# Patient Record
Sex: MALE | Race: WHITE | NOT HISPANIC OR LATINO | Employment: OTHER | URBAN - METROPOLITAN AREA
[De-identification: names, ages, dates, MRNs, and addresses within clinical notes are randomized per-mention and may not be internally consistent; named-entity substitution may affect disease eponyms.]

---

## 2017-06-22 ENCOUNTER — PRE VISIT (OUTPATIENT)
Dept: ORTHOPEDICS | Facility: CLINIC | Age: 62
End: 2017-06-22

## 2017-06-22 NOTE — TELEPHONE ENCOUNTER
1.  Date/reason for appt:  7/31/17   Right Knee    2.  Referring provider:  Self    3.  Call to patient (Yes / No - short description):  No, transferred from .    Previously followed at [facility]  --  Records/injection/imaging    Regular mailed ALLAN form to home address per request.

## 2017-07-17 NOTE — TELEPHONE ENCOUNTER
Records received from [facility].    Included  Office notes: 9/13/16  Other: injection 9/13/16    Missing/needed records: MRI done at [facility] in 2015, xray's done at [facility]

## 2017-07-21 DIAGNOSIS — G89.29 CHRONIC PAIN OF RIGHT KNEE: Primary | ICD-10-CM

## 2017-07-21 DIAGNOSIS — M25.561 CHRONIC PAIN OF RIGHT KNEE: Primary | ICD-10-CM

## 2017-07-31 ENCOUNTER — OFFICE VISIT (OUTPATIENT)
Dept: ORTHOPEDICS | Facility: CLINIC | Age: 62
End: 2017-07-31

## 2017-07-31 VITALS — WEIGHT: 232 LBS | BODY MASS INDEX: 29.77 KG/M2 | HEIGHT: 74 IN

## 2017-07-31 DIAGNOSIS — M17.31 POST-TRAUMATIC OSTEOARTHRITIS OF RIGHT KNEE: Primary | ICD-10-CM

## 2017-07-31 RX ORDER — LISINOPRIL AND HYDROCHLOROTHIAZIDE 20; 25 MG/1; MG/1
1 TABLET ORAL DAILY
COMMUNITY
Start: 2016-09-12

## 2017-07-31 RX ORDER — IBUPROFEN 800 MG/1
800 TABLET, FILM COATED ORAL
COMMUNITY
Start: 2017-06-11 | End: 2018-04-05

## 2017-07-31 RX ORDER — CYANOCOBALAMIN 1000 UG/ML
1000 INJECTION, SOLUTION INTRAMUSCULAR; SUBCUTANEOUS
COMMUNITY
Start: 2016-09-30 | End: 2017-07-31

## 2017-07-31 RX ORDER — PRAVASTATIN SODIUM 10 MG
10 TABLET ORAL DAILY
COMMUNITY
Start: 2017-06-12

## 2017-07-31 NOTE — PROGRESS NOTES
"CC: Bilateral knee pain R>L    HPI:  Juan Manuel is seen today as a self referral for right knee pain.  He has a history of a softball twisting injury in 2004 that needed arthoscopic treatment for a medial menisectomy. Prior to this injury patient reports \"very little knee pain.\". Reports worsening knee pain that he rates a consistent \"ache\" at a \"5 or 6\". Unable to localize the pain but is diffuse throughout knee joint and radiates to the back of the knee. Takes ibuprofen which \"doesn't work as well as it used to\". Has done visco supplements and cortisone with little lasting relief of symptoms. Has activity related night pain. Denies instability \"just achey\".  No new injuries, falls, twist, or recent trauma.    PMH: Reviewed    ROS: Reviewed    FH/SH: Reviewed    Allergies: Reviewed    Meds: Reviewed    PE:  Pleasant and cooperative male alert and oriented x3. Walks with a varus gait deformity and slight limp unable to fully extend right knee during walk through phase. Skin intact with no open areas, rashes, or bruises. Previous portals are healed and intact. Mild joint effusion with a popliteal cyst posterior that is nontender to palpate.ROM -. Mild ligament instability noted with valgus/varus stress in 0,45, and 90 degrees. Negative Lachmans but guarded secondary to extension contracture. Strength 5/5. No neuro deficits. No effusion or erythema noted to joint itself.    Xrays were taken which show bilateral varus malalignment -12/-10 with medial bone on bone end stage grade IV medial OA R >L. Significant osteophytes noted. Lateral compartment is narrowed with significant osteophytes with end stage PF changes noted.    MRI reviewed from 2015.    Dx:   1. 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 Do nothing  2. NSAIDS  3. Injections  4. Bracing  5. PT  6. Surgical options discussed.  At this time, Dr. Beatty recommending a right posterior stabilized TKA.  Risks and benefits " were discussed including post op complications of stiffness, infection, blood clots, and nerve palsy. Informed consent obtained. 30 minutes spent in consultation and planning.     I, Dr. Marcello Beatty have seen and examined this patient with REE Eckert, CNP.

## 2017-07-31 NOTE — NURSING NOTE
Teaching Flowsheet   Relevant Diagnosis: Right knee osteoarthritis  Teaching Topic: Right total knee     Person(s) involved in teaching:   Patient     Motivation Level:  Asks Questions: Yes  Eager to Learn: Yes  Cooperative: Yes  Receptive (willing/able to accept information): Yes  Any cultural factors/Church beliefs that may influence understanding or compliance? No       Patient demonstrates understanding of the following:  Reason for the appointment, diagnosis and treatment plan: Yes  Knowledge of proper use of medications and conditions for which they are ordered (with special attention to potential side effects or drug interactions): Yes  Which situations necessitate calling provider and whom to contact: Yes       Teaching Concerns Addressed:   Patient would like to have the surgery in January. Instructed on the on-line registration for the total joint class, he stated he would sign up closer to surgery date.   Has history of hypertension, but other history is negative.      Proper use and care of medical equip, care aids, etc Yes  Nutritional needs and diet plan: Yes  Pain management techniques: Yes  Wound Care: Yes  How and/when to access community resources: Yes     Instructional Materials Used/Given: Pre-operative surgery packet and anti-bacterial soap.     Time spent with patient: 15 minutes.    a. Does the patient take five or more prescription medications? No  b. Does patient have difficulty walking up two flights of stairs? Yes  c. Is patient s BMI 35 or greater? No  d. Is patient an insulin dependent diabetic? No  e. Does patient have a history of having a heart attack or a heart surgery of any kind? No  f. Does patient have a history of heart failure? No  g. Does the patient have a history of any type of transplant? No  h. Does the patient use inhalers on a daily basis? No  i. Does the patient have a history of pulmonary hypertension? No

## 2017-07-31 NOTE — LETTER
"7/31/2017       RE: Kareem Bai  [address]  [facility] [address]     Dear Colleague,    Thank you for referring your patient, Kareem Bai, to the [facility] at [facility]. Please see a copy of my visit note below.    CC: Bilateral knee pain R>L    HPI:  Juan Manuel is seen today as a self referral for right knee pain.  He has a history of a softball twisting injury in 2004 that needed arthoscopic treatment for a medial menisectomy. Prior to this injury patient reports \"very little knee pain.\". Reports worsening knee pain that he rates a consistent \"ache\" at a \"5 or 6\". Unable to localize the pain but is diffuse throughout knee joint and radiates to the back of the knee. Takes ibuprofen which \"doesn't work as well as it used to\". Has done visco supplements and cortisone with little lasting relief of symptoms. Has activity related night pain. Denies instability \"just achey\".  No new injuries, falls, twist, or recent trauma.    PMH: Reviewed    ROS: Reviewed    FH/SH: Reviewed    Allergies: Reviewed    Meds: Reviewed    PE:  Pleasant and cooperative male alert and oriented x3. Walks with a varus gait deformity and slight limp unable to fully extend right knee during walk through phase. Skin intact with no open areas, rashes, or bruises. Previous portals are healed and intact. Mild joint effusion with a popliteal cyst posterior that is nontender to palpate.ROM -. Mild ligament instability noted with valgus/varus stress in 0,45, and 90 degrees. Negative Lachmans but guarded secondary to extension contracture. Strength 5/5. No neuro deficits. No effusion or erythema noted to joint itself.    Xrays were taken which show bilateral varus malalignment -12/-10 with medial bone on bone end stage grade IV medial OA R >L. Significant osteophytes noted. Lateral compartment is narrowed with significant osteophytes with end stage PF changes noted.    MRI " reviewed from 2015.    Dx:   1. 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 Do nothing  2. NSAIDS  3. Injections  4. Bracing  5. PT  6. Surgical options discussed.  At this time, Dr. Beatty recommending a right posterior stabilized TKA.  Risks and benefits were discussed including post op complications of stiffness, infection, blood clots, and nerve palsy. Informed consent obtained. 30 minutes spent in consultation and planning.     Again, thank you for allowing me to participate in the care of your patient.      Sincerely,    Marcello Beatty MD

## 2017-07-31 NOTE — MR AVS SNAPSHOT
After Visit Summary   2017    Kareem Bai    MRN: [id number]           Patient Information     Date Of Birth          1955        Visit Information        Provider Department      2017 4:00 PM Marcello Beatty MD [facility]        Today's Diagnoses     Post-traumatic osteoarthritis of right knee    -  1       Follow-ups after your visit        Your next 10 appointments already scheduled     2018   Procedure with Marcello Beatty MD   [facility], [place], Same Day Surgery (--)    [address]  [facility] [place] [address] [phone]              Who to contact     Please call your clinic at [phone] to:    Ask questions about your health    Make or cancel appointments    Discuss your medicines    Learn about your test results    Speak to your doctor   If you have compliments or concerns about an experience at your clinic, or if you wish to file a complaint, please contact [facility] Patient Relations at [phone] or email us at [email or web address]         Additional Information About Your Visit        MyChart Information     [employer] is an electronic gateway that provides easy, online access to your medical records. With [employer], you can request a clinic appointment, read your test results, renew a prescription or communicate with your care team.     To sign up for [employer] visit the website at [email or web address]   You will be asked to enter the access code listed below, as well as some personal information. Please follow the directions to create your username and password.     Your access code is: [email or web address]  Expires: 10/15/2017  6:31 AM     Your access code will  in 90 days. If you need help or a new code, please contact your [facility] or call [phone] for assistance.        Care EveryWhere ID     This is your Care EveryWhere ID. This could be used by other  "organizations to access your [facility] medical records  [id number]        Your Vitals Were     Height BMI (Body Mass Index)                1.88 m (6' 2\") 29.79 kg/m2           Blood Pressure from Last 3 Encounters:   No data found for BP    Weight from Last 3 Encounters:   No data found for Wt              Today, you had the following     No orders found for display         Today's Medication Changes          These changes are accurate as of: 7/31/17 11:59 PM.  If you have any questions, ask your nurse or doctor.               Stop taking these medicines if you haven't already. Please contact your care team if you have questions.     cyanocobalamin 1000 MCG/ML injection   Commonly known as:  VITAMIN B12   Stopped by:  Marcello Beatty MD                    Primary Care Provider Office Phone # Fax #    [facility] [phone] [address]  [facility] [address]        Equal Access to Services     [facility]: Hadii aad ku hadasho Soomaali, waaxda luqadaha, qaybta kaalmada adeegyada, waxay idiin hayaan glenn díazn . So [facility] [phone].    ATENCIÓN: Si habla español, tiene a guaman disposición servicios gratuitos de asistencia lingüística. Kandy al [phone].    We comply with applicable federal civil rights laws and [place] laws. We do not discriminate on the basis of race, color, national origin, age, disability sex, sexual orientation or gender identity.            Thank you!     Thank you for choosing [facility] [place] [facility]  for your care. Our goal is always to provide you with excellent care. Hearing back from our patients is one way we can continue to improve our services. Please take a few minutes to complete the written survey that you may receive in the mail after your visit with us. Thank you!             Your Updated Medication List - Protect others around you: Learn how to safely use, store and throw away your medicines at " www.disposemymeds.org.          This list is accurate as of: 7/31/17 11:59 PM.  Always use your most recent med list.                   Brand Name Dispense Instructions for use Diagnosis    ibuprofen 800 MG tablet    ADVIL/MOTRIN     Take 800 mg by mouth        lisinopril-hydrochlorothiazide 20-25 MG per tablet    PRINZIDE/ZESTORETIC          pravastatin 10 MG tablet    PRAVACHOL     TAKE ONE TABLET BY MOUTH AT BEDTIME

## 2017-12-12 ENCOUNTER — HOSPITAL ENCOUNTER (OUTPATIENT)
Dept: EDUCATION SERVICES | Facility: CLINIC | Age: 62
End: 2017-12-12
Payer: COMMERCIAL

## 2018-01-01 ENCOUNTER — ANESTHESIA EVENT (OUTPATIENT)
Dept: SURGERY | Facility: CLINIC | Age: 63
End: 2018-01-01
Payer: COMMERCIAL

## 2018-01-04 ENCOUNTER — APPOINTMENT (OUTPATIENT)
Dept: GENERAL RADIOLOGY | Facility: CLINIC | Age: 63
End: 2018-01-04
Attending: ORTHOPAEDIC SURGERY
Payer: COMMERCIAL

## 2018-01-04 ENCOUNTER — ANESTHESIA (OUTPATIENT)
Dept: SURGERY | Facility: CLINIC | Age: 63
End: 2018-01-04
Payer: COMMERCIAL

## 2018-01-04 ENCOUNTER — APPOINTMENT (OUTPATIENT)
Dept: PHYSICAL THERAPY | Facility: CLINIC | Age: 63
End: 2018-01-04
Attending: ORTHOPAEDIC SURGERY
Payer: COMMERCIAL

## 2018-01-04 ENCOUNTER — SURGERY (OUTPATIENT)
Age: 63
End: 2018-01-04

## 2018-01-04 ENCOUNTER — HOSPITAL ENCOUNTER (INPATIENT)
Facility: CLINIC | Age: 63
LOS: 3 days | Discharge: HOME | End: 2018-01-07
Attending: ORTHOPAEDIC SURGERY | Admitting: ORTHOPAEDIC SURGERY
Payer: COMMERCIAL

## 2018-01-04 DIAGNOSIS — Z96.651 HISTORY OF TOTAL RIGHT KNEE REPLACEMENT: Primary | ICD-10-CM

## 2018-01-04 PROBLEM — Z96.659 HX OF TOTAL KNEE ARTHROPLASTY: Status: ACTIVE | Noted: 2018-01-04

## 2018-01-04 LAB
ABO + RH BLD: NORMAL
ABO + RH BLD: NORMAL
ALBUMIN UR-MCNC: NEGATIVE MG/DL
APPEARANCE UR: CLEAR
BILIRUB UR QL STRIP: NEGATIVE
BLD GP AB SCN SERPL QL: NORMAL
BLOOD BANK CMNT PATIENT-IMP: NORMAL
COLOR UR AUTO: YELLOW
GLUCOSE SERPL-MCNC: 105 MG/DL (ref 70–99)
GLUCOSE UR STRIP-MCNC: NEGATIVE MG/DL
HGB BLD-MCNC: 14.8 G/DL (ref 13.3–17.7)
HGB UR QL STRIP: NEGATIVE
KETONES UR STRIP-MCNC: NEGATIVE MG/DL
LEUKOCYTE ESTERASE UR QL STRIP: ABNORMAL
MUCOUS THREADS #/AREA URNS LPF: PRESENT /LPF
NITRATE UR QL: NEGATIVE
PH UR STRIP: 5.5 PH (ref 5–7)
RBC #/AREA URNS AUTO: 1 /HPF (ref 0–2)
SOURCE: ABNORMAL
SP GR UR STRIP: 1.02 (ref 1–1.03)
SPECIMEN EXP DATE BLD: NORMAL
UROBILINOGEN UR STRIP-MCNC: NORMAL MG/DL (ref 0–2)
WBC #/AREA URNS AUTO: <1 /HPF (ref 0–2)

## 2018-01-04 PROCEDURE — 25000125 ZZHC RX 250: Performed by: STUDENT IN AN ORGANIZED HEALTH CARE EDUCATION/TRAINING PROGRAM

## 2018-01-04 PROCEDURE — 0SRC0J9 REPLACEMENT OF RIGHT KNEE JOINT WITH SYNTHETIC SUBSTITUTE, CEMENTED, OPEN APPROACH: ICD-10-PCS | Performed by: ORTHOPAEDIC SURGERY

## 2018-01-04 PROCEDURE — 25000128 H RX IP 250 OP 636: Performed by: NURSE ANESTHETIST, CERTIFIED REGISTERED

## 2018-01-04 PROCEDURE — 25000128 H RX IP 250 OP 636: Performed by: STUDENT IN AN ORGANIZED HEALTH CARE EDUCATION/TRAINING PROGRAM

## 2018-01-04 PROCEDURE — 37000009 ZZH ANESTHESIA TECHNICAL FEE, EACH ADDTL 15 MIN: Performed by: ORTHOPAEDIC SURGERY

## 2018-01-04 PROCEDURE — 85018 HEMOGLOBIN: CPT | Performed by: ANESTHESIOLOGY

## 2018-01-04 PROCEDURE — 25000132 ZZH RX MED GY IP 250 OP 250 PS 637: Performed by: ORTHOPAEDIC SURGERY

## 2018-01-04 PROCEDURE — 40000193 ZZH STATISTIC PT WARD VISIT: Performed by: PHYSICAL THERAPIST

## 2018-01-04 PROCEDURE — 0MNN0ZZ RELEASE RIGHT KNEE BURSA AND LIGAMENT, OPEN APPROACH: ICD-10-PCS | Performed by: ORTHOPAEDIC SURGERY

## 2018-01-04 PROCEDURE — 27210995 ZZH RX 272: Performed by: ORTHOPAEDIC SURGERY

## 2018-01-04 PROCEDURE — C9290 INJ, BUPIVACAINE LIPOSOME: HCPCS | Performed by: STUDENT IN AN ORGANIZED HEALTH CARE EDUCATION/TRAINING PROGRAM

## 2018-01-04 PROCEDURE — 25000125 ZZHC RX 250: Performed by: ORTHOPAEDIC SURGERY

## 2018-01-04 PROCEDURE — 81001 URINALYSIS AUTO W/SCOPE: CPT | Performed by: ORTHOPAEDIC SURGERY

## 2018-01-04 PROCEDURE — 97161 PT EVAL LOW COMPLEX 20 MIN: CPT | Mod: GP | Performed by: PHYSICAL THERAPIST

## 2018-01-04 PROCEDURE — 86900 BLOOD TYPING SEROLOGIC ABO: CPT | Performed by: ORTHOPAEDIC SURGERY

## 2018-01-04 PROCEDURE — 99207 ZZC CONSULT E&M CHANGED TO SUBSEQUENT LEVEL: CPT | Performed by: INTERNAL MEDICINE

## 2018-01-04 PROCEDURE — 71000015 ZZH RECOVERY PHASE 1 LEVEL 2 EA ADDTL HR: Performed by: ORTHOPAEDIC SURGERY

## 2018-01-04 PROCEDURE — 40000986 XR KNEE PORT RT 1/2 VW: Mod: RT

## 2018-01-04 PROCEDURE — 25000125 ZZHC RX 250: Performed by: NURSE ANESTHETIST, CERTIFIED REGISTERED

## 2018-01-04 PROCEDURE — 97530 THERAPEUTIC ACTIVITIES: CPT | Mod: GP | Performed by: PHYSICAL THERAPIST

## 2018-01-04 PROCEDURE — 25000566 ZZH SEVOFLURANE, EA 15 MIN: Performed by: ORTHOPAEDIC SURGERY

## 2018-01-04 PROCEDURE — 40000170 ZZH STATISTIC PRE-PROCEDURE ASSESSMENT II: Performed by: ORTHOPAEDIC SURGERY

## 2018-01-04 PROCEDURE — 97116 GAIT TRAINING THERAPY: CPT | Mod: GP | Performed by: PHYSICAL THERAPIST

## 2018-01-04 PROCEDURE — 82947 ASSAY GLUCOSE BLOOD QUANT: CPT | Performed by: ANESTHESIOLOGY

## 2018-01-04 PROCEDURE — 25000128 H RX IP 250 OP 636: Performed by: ORTHOPAEDIC SURGERY

## 2018-01-04 PROCEDURE — 86901 BLOOD TYPING SEROLOGIC RH(D): CPT | Performed by: ORTHOPAEDIC SURGERY

## 2018-01-04 PROCEDURE — 37000008 ZZH ANESTHESIA TECHNICAL FEE, 1ST 30 MIN: Performed by: ORTHOPAEDIC SURGERY

## 2018-01-04 PROCEDURE — 27810169 ZZH OR IMPLANT GENERAL: Performed by: ORTHOPAEDIC SURGERY

## 2018-01-04 PROCEDURE — 36000076 ZZH SURGERY LEVEL 6 EA 15 ADDTL MIN - UMMC: Performed by: ORTHOPAEDIC SURGERY

## 2018-01-04 PROCEDURE — 86850 RBC ANTIBODY SCREEN: CPT | Performed by: ORTHOPAEDIC SURGERY

## 2018-01-04 PROCEDURE — 36415 COLL VENOUS BLD VENIPUNCTURE: CPT | Performed by: ORTHOPAEDIC SURGERY

## 2018-01-04 PROCEDURE — 25000128 H RX IP 250 OP 636: Performed by: ANESTHESIOLOGY

## 2018-01-04 PROCEDURE — 27210794 ZZH OR GENERAL SUPPLY STERILE: Performed by: ORTHOPAEDIC SURGERY

## 2018-01-04 PROCEDURE — C1776 JOINT DEVICE (IMPLANTABLE): HCPCS | Performed by: ORTHOPAEDIC SURGERY

## 2018-01-04 PROCEDURE — 8E0YXBZ COMPUTER ASSISTED PROCEDURE OF LOWER EXTREMITY: ICD-10-PCS | Performed by: ORTHOPAEDIC SURGERY

## 2018-01-04 PROCEDURE — 12000001 ZZH R&B MED SURG/OB UMMC

## 2018-01-04 PROCEDURE — 71000014 ZZH RECOVERY PHASE 1 LEVEL 2 FIRST HR: Performed by: ORTHOPAEDIC SURGERY

## 2018-01-04 PROCEDURE — 99232 SBSQ HOSP IP/OBS MODERATE 35: CPT | Performed by: INTERNAL MEDICINE

## 2018-01-04 PROCEDURE — 36000074 ZZH SURGERY LEVEL 6 1ST 30 MIN - UMMC: Performed by: ORTHOPAEDIC SURGERY

## 2018-01-04 PROCEDURE — 25800025 ZZH RX 258: Performed by: ORTHOPAEDIC SURGERY

## 2018-01-04 PROCEDURE — 97110 THERAPEUTIC EXERCISES: CPT | Mod: GP | Performed by: PHYSICAL THERAPIST

## 2018-01-04 DEVICE — IMP COMP PATELLA HOWM TRI ASYM 35X10MM 5551-G-350: Type: IMPLANTABLE DEVICE | Site: KNEE | Status: FUNCTIONAL

## 2018-01-04 DEVICE — IMP BASEPLATE TIBIAL HOWM TRI 7 5520-B-700: Type: IMPLANTABLE DEVICE | Site: KNEE | Status: FUNCTIONAL

## 2018-01-04 DEVICE — BONE CEMENT SIMPLEX W/TOBRAMYCIN 6197-9-001: Type: IMPLANTABLE DEVICE | Site: KNEE | Status: FUNCTIONAL

## 2018-01-04 DEVICE — IMP COMP FEM STRK TRIATHLN PS RT 7 5515-F-702: Type: IMPLANTABLE DEVICE | Site: KNEE | Status: FUNCTIONAL

## 2018-01-04 DEVICE — IMPLANTABLE DEVICE: Type: IMPLANTABLE DEVICE | Site: KNEE | Status: FUNCTIONAL

## 2018-01-04 RX ORDER — ONDANSETRON 2 MG/ML
INJECTION INTRAMUSCULAR; INTRAVENOUS PRN
Status: DISCONTINUED | OUTPATIENT
Start: 2018-01-04 | End: 2018-01-04

## 2018-01-04 RX ORDER — MAGNESIUM HYDROXIDE 1200 MG/15ML
LIQUID ORAL PRN
Status: DISCONTINUED | OUTPATIENT
Start: 2018-01-04 | End: 2018-01-04 | Stop reason: HOSPADM

## 2018-01-04 RX ORDER — ONDANSETRON 2 MG/ML
4 INJECTION INTRAMUSCULAR; INTRAVENOUS EVERY 6 HOURS PRN
Status: DISCONTINUED | OUTPATIENT
Start: 2018-01-04 | End: 2018-01-07 | Stop reason: HOSPADM

## 2018-01-04 RX ORDER — DEXAMETHASONE SODIUM PHOSPHATE 4 MG/ML
INJECTION, SOLUTION INTRA-ARTICULAR; INTRALESIONAL; INTRAMUSCULAR; INTRAVENOUS; SOFT TISSUE PRN
Status: DISCONTINUED | OUTPATIENT
Start: 2018-01-04 | End: 2018-01-04

## 2018-01-04 RX ORDER — CLINDAMYCIN PHOSPHATE 900 MG/50ML
900 INJECTION, SOLUTION INTRAVENOUS EVERY 8 HOURS
Status: DISCONTINUED | OUTPATIENT
Start: 2018-01-04 | End: 2018-01-04 | Stop reason: HOSPADM

## 2018-01-04 RX ORDER — BUPIVACAINE HYDROCHLORIDE AND EPINEPHRINE 5; 5 MG/ML; UG/ML
INJECTION, SOLUTION PERINEURAL PRN
Status: DISCONTINUED | OUTPATIENT
Start: 2018-01-04 | End: 2018-01-04

## 2018-01-04 RX ORDER — KETOROLAC TROMETHAMINE 30 MG/ML
30 INJECTION, SOLUTION INTRAMUSCULAR; INTRAVENOUS EVERY 6 HOURS PRN
Status: DISCONTINUED | OUTPATIENT
Start: 2018-01-04 | End: 2018-01-07 | Stop reason: HOSPADM

## 2018-01-04 RX ORDER — FLUMAZENIL 0.1 MG/ML
0.2 INJECTION, SOLUTION INTRAVENOUS
Status: DISCONTINUED | OUTPATIENT
Start: 2018-01-04 | End: 2018-01-04 | Stop reason: HOSPADM

## 2018-01-04 RX ORDER — CLINDAMYCIN PHOSPHATE 900 MG/50ML
900 INJECTION, SOLUTION INTRAVENOUS SEE ADMIN INSTRUCTIONS
Status: DISCONTINUED | OUTPATIENT
Start: 2018-01-04 | End: 2018-01-04 | Stop reason: HOSPADM

## 2018-01-04 RX ORDER — SODIUM CHLORIDE, SODIUM LACTATE, POTASSIUM CHLORIDE, CALCIUM CHLORIDE 600; 310; 30; 20 MG/100ML; MG/100ML; MG/100ML; MG/100ML
INJECTION, SOLUTION INTRAVENOUS CONTINUOUS
Status: DISCONTINUED | OUTPATIENT
Start: 2018-01-04 | End: 2018-01-07 | Stop reason: HOSPADM

## 2018-01-04 RX ORDER — ONDANSETRON 4 MG/1
4 TABLET, ORALLY DISINTEGRATING ORAL EVERY 30 MIN PRN
Status: DISCONTINUED | OUTPATIENT
Start: 2018-01-04 | End: 2018-01-04 | Stop reason: HOSPADM

## 2018-01-04 RX ORDER — ACETAMINOPHEN 325 MG/1
975 TABLET ORAL ONCE
Status: COMPLETED | OUTPATIENT
Start: 2018-01-04 | End: 2018-01-04

## 2018-01-04 RX ORDER — CLINDAMYCIN PHOSPHATE 900 MG/50ML
900 INJECTION, SOLUTION INTRAVENOUS
Status: DISCONTINUED | OUTPATIENT
Start: 2018-01-04 | End: 2018-01-04 | Stop reason: HOSPADM

## 2018-01-04 RX ORDER — SODIUM CHLORIDE, SODIUM LACTATE, POTASSIUM CHLORIDE, CALCIUM CHLORIDE 600; 310; 30; 20 MG/100ML; MG/100ML; MG/100ML; MG/100ML
INJECTION, SOLUTION INTRAVENOUS CONTINUOUS PRN
Status: DISCONTINUED | OUTPATIENT
Start: 2018-01-04 | End: 2018-01-04

## 2018-01-04 RX ORDER — FENTANYL CITRATE 50 UG/ML
25-50 INJECTION, SOLUTION INTRAMUSCULAR; INTRAVENOUS
Status: DISCONTINUED | OUTPATIENT
Start: 2018-01-04 | End: 2018-01-04 | Stop reason: HOSPADM

## 2018-01-04 RX ORDER — GABAPENTIN 300 MG/1
300 CAPSULE ORAL
Status: COMPLETED | OUTPATIENT
Start: 2018-01-04 | End: 2018-01-04

## 2018-01-04 RX ORDER — CELECOXIB 200 MG/1
200 CAPSULE ORAL ONCE
Status: COMPLETED | OUTPATIENT
Start: 2018-01-04 | End: 2018-01-04

## 2018-01-04 RX ORDER — SODIUM CHLORIDE, SODIUM LACTATE, POTASSIUM CHLORIDE, CALCIUM CHLORIDE 600; 310; 30; 20 MG/100ML; MG/100ML; MG/100ML; MG/100ML
INJECTION, SOLUTION INTRAVENOUS CONTINUOUS
Status: DISCONTINUED | OUTPATIENT
Start: 2018-01-04 | End: 2018-01-04 | Stop reason: HOSPADM

## 2018-01-04 RX ORDER — OXYCODONE HYDROCHLORIDE 5 MG/1
5-10 TABLET ORAL
Status: DISCONTINUED | OUTPATIENT
Start: 2018-01-04 | End: 2018-01-07 | Stop reason: HOSPADM

## 2018-01-04 RX ORDER — PRAVASTATIN SODIUM 10 MG
10 TABLET ORAL DAILY
Status: DISCONTINUED | OUTPATIENT
Start: 2018-01-05 | End: 2018-01-07 | Stop reason: HOSPADM

## 2018-01-04 RX ORDER — AMOXICILLIN 250 MG
1-2 CAPSULE ORAL 2 TIMES DAILY
Status: DISCONTINUED | OUTPATIENT
Start: 2018-01-04 | End: 2018-01-07 | Stop reason: HOSPADM

## 2018-01-04 RX ORDER — NALOXONE HYDROCHLORIDE 0.4 MG/ML
.1-.4 INJECTION, SOLUTION INTRAMUSCULAR; INTRAVENOUS; SUBCUTANEOUS
Status: DISCONTINUED | OUTPATIENT
Start: 2018-01-04 | End: 2018-01-04 | Stop reason: HOSPADM

## 2018-01-04 RX ORDER — PROPOFOL 10 MG/ML
INJECTION, EMULSION INTRAVENOUS PRN
Status: DISCONTINUED | OUTPATIENT
Start: 2018-01-04 | End: 2018-01-04

## 2018-01-04 RX ORDER — LIDOCAINE HYDROCHLORIDE 20 MG/ML
INJECTION, SOLUTION INFILTRATION; PERINEURAL PRN
Status: DISCONTINUED | OUTPATIENT
Start: 2018-01-04 | End: 2018-01-04

## 2018-01-04 RX ORDER — BISACODYL 10 MG
10 SUPPOSITORY, RECTAL RECTAL DAILY
Status: DISCONTINUED | OUTPATIENT
Start: 2018-01-05 | End: 2018-01-07 | Stop reason: HOSPADM

## 2018-01-04 RX ORDER — EPHEDRINE SULFATE 50 MG/ML
INJECTION, SOLUTION INTRAVENOUS PRN
Status: DISCONTINUED | OUTPATIENT
Start: 2018-01-04 | End: 2018-01-04

## 2018-01-04 RX ORDER — FENTANYL CITRATE 50 UG/ML
25-50 INJECTION, SOLUTION INTRAMUSCULAR; INTRAVENOUS EVERY 5 MIN PRN
Status: DISCONTINUED | OUTPATIENT
Start: 2018-01-04 | End: 2018-01-04 | Stop reason: HOSPADM

## 2018-01-04 RX ORDER — NALOXONE HYDROCHLORIDE 0.4 MG/ML
.1-.4 INJECTION, SOLUTION INTRAMUSCULAR; INTRAVENOUS; SUBCUTANEOUS
Status: DISCONTINUED | OUTPATIENT
Start: 2018-01-04 | End: 2018-01-07 | Stop reason: HOSPADM

## 2018-01-04 RX ORDER — ONDANSETRON 2 MG/ML
4 INJECTION INTRAMUSCULAR; INTRAVENOUS EVERY 30 MIN PRN
Status: DISCONTINUED | OUTPATIENT
Start: 2018-01-04 | End: 2018-01-04 | Stop reason: HOSPADM

## 2018-01-04 RX ORDER — KETOROLAC TROMETHAMINE 30 MG/ML
INJECTION, SOLUTION INTRAMUSCULAR; INTRAVENOUS PRN
Status: DISCONTINUED | OUTPATIENT
Start: 2018-01-04 | End: 2018-01-04

## 2018-01-04 RX ORDER — HYDROMORPHONE HYDROCHLORIDE 1 MG/ML
.3-.5 INJECTION, SOLUTION INTRAMUSCULAR; INTRAVENOUS; SUBCUTANEOUS
Status: DISCONTINUED | OUTPATIENT
Start: 2018-01-04 | End: 2018-01-07 | Stop reason: HOSPADM

## 2018-01-04 RX ORDER — FENTANYL CITRATE 50 UG/ML
INJECTION, SOLUTION INTRAMUSCULAR; INTRAVENOUS PRN
Status: DISCONTINUED | OUTPATIENT
Start: 2018-01-04 | End: 2018-01-04

## 2018-01-04 RX ORDER — ONDANSETRON 4 MG/1
4 TABLET, ORALLY DISINTEGRATING ORAL EVERY 6 HOURS PRN
Status: DISCONTINUED | OUTPATIENT
Start: 2018-01-04 | End: 2018-01-07 | Stop reason: HOSPADM

## 2018-01-04 RX ORDER — LIDOCAINE 40 MG/G
CREAM TOPICAL
Status: DISCONTINUED | OUTPATIENT
Start: 2018-01-04 | End: 2018-01-07 | Stop reason: HOSPADM

## 2018-01-04 RX ORDER — ASPIRIN 325 MG/1
325 TABLET, FILM COATED ORAL DAILY
Status: DISCONTINUED | OUTPATIENT
Start: 2018-01-04 | End: 2018-01-07 | Stop reason: HOSPADM

## 2018-01-04 RX ORDER — ACETAMINOPHEN 325 MG/1
650 TABLET ORAL EVERY 4 HOURS PRN
Status: DISCONTINUED | OUTPATIENT
Start: 2018-01-07 | End: 2018-01-07 | Stop reason: HOSPADM

## 2018-01-04 RX ORDER — BUPIVACAINE HYDROCHLORIDE AND EPINEPHRINE 5; 5 MG/ML; UG/ML
INJECTION, SOLUTION PERINEURAL PRN
Status: DISCONTINUED | OUTPATIENT
Start: 2018-01-04 | End: 2018-01-04 | Stop reason: HOSPADM

## 2018-01-04 RX ADMIN — MIDAZOLAM 1 MG: 1 INJECTION INTRAMUSCULAR; INTRAVENOUS at 07:07

## 2018-01-04 RX ADMIN — MIDAZOLAM HYDROCHLORIDE 1 MG: 1 INJECTION, SOLUTION INTRAMUSCULAR; INTRAVENOUS at 06:53

## 2018-01-04 RX ADMIN — FENTANYL CITRATE 50 MCG: 50 INJECTION, SOLUTION INTRAMUSCULAR; INTRAVENOUS at 09:30

## 2018-01-04 RX ADMIN — SODIUM CHLORIDE, POTASSIUM CHLORIDE, SODIUM LACTATE AND CALCIUM CHLORIDE: 600; 310; 30; 20 INJECTION, SOLUTION INTRAVENOUS at 08:45

## 2018-01-04 RX ADMIN — SODIUM CHLORIDE 1 G: 9 INJECTION, SOLUTION INTRAVENOUS at 07:25

## 2018-01-04 RX ADMIN — CELECOXIB 200 MG: 200 CAPSULE ORAL at 06:20

## 2018-01-04 RX ADMIN — GABAPENTIN 300 MG: 300 CAPSULE ORAL at 06:20

## 2018-01-04 RX ADMIN — OXYCODONE HYDROCHLORIDE 5 MG: 5 TABLET ORAL at 13:57

## 2018-01-04 RX ADMIN — SODIUM CHLORIDE, POTASSIUM CHLORIDE, SODIUM LACTATE AND CALCIUM CHLORIDE: 600; 310; 30; 20 INJECTION, SOLUTION INTRAVENOUS at 07:00

## 2018-01-04 RX ADMIN — PHENYLEPHRINE HYDROCHLORIDE 100 MCG: 10 INJECTION, SOLUTION INTRAMUSCULAR; INTRAVENOUS; SUBCUTANEOUS at 09:01

## 2018-01-04 RX ADMIN — MIDAZOLAM 1 MG: 1 INJECTION INTRAMUSCULAR; INTRAVENOUS at 07:10

## 2018-01-04 RX ADMIN — BUPIVACAINE HYDROCHLORIDE AND EPINEPHRINE BITARTRATE 5 ML: 5; .005 INJECTION, SOLUTION EPIDURAL; INTRACAUDAL; PERINEURAL at 07:00

## 2018-01-04 RX ADMIN — SODIUM CHLORIDE 700 ML: 900 IRRIGANT IRRIGATION at 08:57

## 2018-01-04 RX ADMIN — DEXAMETHASONE SODIUM PHOSPHATE 8 MG: 4 INJECTION, SOLUTION INTRAMUSCULAR; INTRAVENOUS at 07:21

## 2018-01-04 RX ADMIN — PHENYLEPHRINE HYDROCHLORIDE 100 MCG: 10 INJECTION, SOLUTION INTRAMUSCULAR; INTRAVENOUS; SUBCUTANEOUS at 07:42

## 2018-01-04 RX ADMIN — OXYCODONE HYDROCHLORIDE 5 MG: 5 TABLET ORAL at 20:22

## 2018-01-04 RX ADMIN — CLINDAMYCIN PHOSPHATE 900 MG: 18 INJECTION, SOLUTION INTRAVENOUS at 07:29

## 2018-01-04 RX ADMIN — EPHEDRINE SULFATE 5 MG: 50 INJECTION, SOLUTION INTRAVENOUS at 07:42

## 2018-01-04 RX ADMIN — BUPIVACAINE HYDROCHLORIDE AND EPINEPHRINE BITARTRATE 6 ML: 5; .005 INJECTION, SOLUTION EPIDURAL; INTRACAUDAL; PERINEURAL at 07:47

## 2018-01-04 RX ADMIN — ACETAMINOPHEN 975 MG: 325 TABLET, FILM COATED ORAL at 06:20

## 2018-01-04 RX ADMIN — FENTANYL CITRATE 100 MCG: 50 INJECTION, SOLUTION INTRAMUSCULAR; INTRAVENOUS at 07:10

## 2018-01-04 RX ADMIN — KETOROLAC TROMETHAMINE 15 MG: 30 INJECTION, SOLUTION INTRAMUSCULAR at 09:06

## 2018-01-04 RX ADMIN — PHENYLEPHRINE HYDROCHLORIDE 100 MCG: 10 INJECTION, SOLUTION INTRAMUSCULAR; INTRAVENOUS; SUBCUTANEOUS at 07:18

## 2018-01-04 RX ADMIN — SODIUM CHLORIDE, POTASSIUM CHLORIDE, SODIUM LACTATE AND CALCIUM CHLORIDE: 600; 310; 30; 20 INJECTION, SOLUTION INTRAVENOUS at 07:45

## 2018-01-04 RX ADMIN — SODIUM CHLORIDE 200 ML: 900 IRRIGANT IRRIGATION at 08:26

## 2018-01-04 RX ADMIN — EPINEPHRINE: 1 INJECTION PARENTERAL at 08:21

## 2018-01-04 RX ADMIN — SODIUM CHLORIDE 1 G: 9 INJECTION, SOLUTION INTRAVENOUS at 09:28

## 2018-01-04 RX ADMIN — Medication 0.3 MG: at 10:30

## 2018-01-04 RX ADMIN — ONDANSETRON 4 MG: 2 INJECTION INTRAMUSCULAR; INTRAVENOUS at 09:06

## 2018-01-04 RX ADMIN — FAMOTIDINE 20 MG: 10 INJECTION, SOLUTION INTRAVENOUS at 09:04

## 2018-01-04 RX ADMIN — PROPOFOL 300 MG: 10 INJECTION, EMULSION INTRAVENOUS at 07:15

## 2018-01-04 RX ADMIN — PHENYLEPHRINE HYDROCHLORIDE 200 MCG: 10 INJECTION, SOLUTION INTRAMUSCULAR; INTRAVENOUS; SUBCUTANEOUS at 07:30

## 2018-01-04 RX ADMIN — LIDOCAINE HYDROCHLORIDE 40 MG: 20 INJECTION, SOLUTION INFILTRATION; PERINEURAL at 07:15

## 2018-01-04 RX ADMIN — ASPIRIN 325 MG: 325 TABLET, FILM COATED ORAL at 14:46

## 2018-01-04 RX ADMIN — PHENYLEPHRINE HYDROCHLORIDE 100 MCG: 10 INJECTION, SOLUTION INTRAMUSCULAR; INTRAVENOUS; SUBCUTANEOUS at 08:40

## 2018-01-04 RX ADMIN — OXYCODONE HYDROCHLORIDE 5 MG: 5 TABLET ORAL at 16:35

## 2018-01-04 RX ADMIN — FENTANYL CITRATE 25 MCG: 50 INJECTION, SOLUTION INTRAMUSCULAR; INTRAVENOUS at 10:07

## 2018-01-04 RX ADMIN — EPHEDRINE SULFATE 7.5 MG: 50 INJECTION, SOLUTION INTRAVENOUS at 07:21

## 2018-01-04 RX ADMIN — PROPOFOL 100 MG: 10 INJECTION, EMULSION INTRAVENOUS at 07:17

## 2018-01-04 RX ADMIN — OXYCODONE HYDROCHLORIDE 5 MG: 5 TABLET ORAL at 12:35

## 2018-01-04 RX ADMIN — OXYCODONE HYDROCHLORIDE 10 MG: 5 TABLET ORAL at 23:26

## 2018-01-04 RX ADMIN — BUPIVACAINE 10 ML: 13.3 INJECTION, SUSPENSION, LIPOSOMAL INFILTRATION at 07:00

## 2018-01-04 RX ADMIN — PHENYLEPHRINE HYDROCHLORIDE 100 MCG: 10 INJECTION, SOLUTION INTRAMUSCULAR; INTRAVENOUS; SUBCUTANEOUS at 07:20

## 2018-01-04 ASSESSMENT — ENCOUNTER SYMPTOMS: SEIZURES: 1

## 2018-01-04 ASSESSMENT — LIFESTYLE VARIABLES: TOBACCO_USE: 1

## 2018-01-04 NOTE — OR NURSING
Pt has an open sore on right big toe and left big toe, and a bruise on left 2nd toe with deformity on nails, pt states it's from his neuropathy. Dr. Beatty at bedside and aware of this, ok to proceed.

## 2018-01-04 NOTE — ANESTHESIA CARE TRANSFER NOTE
Patient: Kareem Bai    Procedure(s):  Right Total Knee Arthroplasty - Wound Class: I-Clean    Diagnosis: Right Knee Osteoarthritis  Diagnosis Additional Information: No value filed.    Anesthesia Type:   General, LMA     Note:  Airway :Face Mask  Patient transferred to:PACU  Comments: To PACU with 02, Spont RR. Monitors applied, VSS, PIV/airway patent, Report to RN all questions/concerns answered.   Handoff Report: Identifed the Patient, Identified the Reponsible Provider, Reviewed the pertinent medical history, Discussed the surgical course, Reviewed Intra-OP anesthesia mangement and issues during anesthesia, Set expectations for post-procedure period and Allowed opportunity for questions and acknowledgement of understanding      Vitals: (Last set prior to Anesthesia Care Transfer)    CRNA VITALS  1/4/2018 0908 - 1/4/2018 0947      1/4/2018             Resp Rate (observed): 14                Electronically Signed By: REE Solitario CRNA  January 4, 2018  9:47 AM

## 2018-01-04 NOTE — ANESTHESIA POSTPROCEDURE EVALUATION
Patient: Kareem Bai    Procedure(s):  Right Total Knee Arthroplasty - Wound Class: I-Clean    Diagnosis:Right Knee Osteoarthritis  Diagnosis Additional Information: No value filed.    Anesthesia Type:  General, LMA    Note:  Anesthesia Post Evaluation    Patient location during evaluation: PACU  Patient participation: Able to fully participate in evaluation  Level of consciousness: awake and alert  Pain management: adequate  Airway patency: patent  Cardiovascular status: acceptable  Respiratory status: acceptable  Hydration status: acceptable  PONV: none     Anesthetic complications: None          Last vitals:  Vitals:    01/04/18 1115 01/04/18 1130 01/04/18 1145   BP: 133/83 135/77 128/82   Resp: 19 17 16   Temp: 36.7  C (98.1  F)  36.6  C (97.9  F)   SpO2: 94% 94% 95%         Electronically Signed By: Praveena Lua MD  January 4, 2018  12:55 PM

## 2018-01-04 NOTE — OR NURSING
PACU to Inpatient Nursing Handoff    Patient Kareem Bai is a 62 year old male who speaks English.   Procedure Procedure(s):  Right Total Knee Arthroplasty - Wound Class: I-Clean   Surgeon(s) Primary: Marcello Beatty MD  Assisting: Quoc Suarez PA-C     Allergies   Allergen Reactions     Ampicillin Other (See Comments) and Hives     Codeine GI Disturbance     LW Reaction: gi upset     Simvastatin Other (See Comments)       Isolation  [unfilled]    Past Medical History   has a past medical history of Sleep apnea. He also has no past medical history of History of blood transfusion.    Anesthesia Combined General with Block   Dermatome Level     Preop Meds acetaminophen (Tylenol) - time given: 0620, Celebrex 200mg, Gabapentin 300mg at 0620   Nerve block Adductor canal.  Location:right. Med:Exparel (liposomal bupivacaine). Time given: 0655   Intraop Meds ketorolac (Toradol): last given at 0906, Versed 2mg, Fentanyl 150, zofran 4mg 0906, decadron 8mg @0721   Local Meds Yes - Local Cocktail (morphine, ropivacaine, epinephrine, Toradol)   Antibiotics clindamycin (Cleocin) - last given at 0729     Pain Patient Currently in Pain: yes  Comfort: comfortably manageable  Pain Control: partially effective   PACU meds  fentanyl (Sublimaze): 25 mcg (total dose) last given at 1007 , Dilaudid 0.3 @1030   PCA / epidural No   Capnography  yes   Telemetry ECG Rhythm: Normal sinus rhythm      Labs Glucose Lab Results   Component Value Date     01/04/2018       Hgb Lab Results   Component Value Date    HGB 14.8 01/04/2018       INR No results found for: INR   PACU Imaging Not completed - no orders at this time     Wound/Incision Incision/Surgical Site 01/04/18 Right Knee (Active)   Incision Assessment UTV 1/4/2018  9:41 AM   Closure Adhesive strip(s) 1/4/2018  9:32 AM   Dressing Intervention Clean, dry, intact 1/4/2018  9:41 AM   Number of days:0      CMS Peripheral Neurovascular WDL:  WDL except;sensation  (Group) (01/04/18 0941)  LLE Temperature: warm (01/04/18 0941)  LLE Color: pale (01/04/18 0941)  LLE Sensation: numbness present;tingling present (baseline) (01/04/18 0941)  RLE Temperature: warm (01/04/18 0941)  RLE Color: pale (01/04/18 0941)  RLE Sensation: numbness present;tingling present (baseline) (01/04/18 0941)   Equipment continuous passive motion machine (CPM)   Other LDA       IV Access Peripheral IV 01/04/18 Left Hand (Active)   Site Assessment WDL 1/4/2018  9:41 AM   Line Status Saline locked 1/4/2018  9:41 AM   Phlebitis Scale 0-->no symptoms 1/4/2018  9:41 AM   Infiltration Scale 0 1/4/2018  9:41 AM   Number of days:0       Peripheral IV 01/04/18 Right Hand (Active)   Site Assessment WDL 1/4/2018  9:41 AM   Line Status Infusing 1/4/2018  9:41 AM   Phlebitis Scale 0-->no symptoms 1/4/2018  9:41 AM   Infiltration Scale 0 1/4/2018  9:41 AM   Number of days:0      Blood Products Not applicable EBL 30 mL   Intake/Output Date 01/04/18 0700 - 01/05/18 0659   Shift 6050-9189 5133-7010 2426-5594 24 Hour Total   I  N  T  A  K  E   I.V. 1305   1305    Shift Total  (mL/kg) 1305  (12.91)   1305  (12.91)   O  U  T  P  U  T   Shift Total  (mL/kg)       Weight (kg) 101.1 101.1 101.1 101.1        Drains / Santos Closed/Suction Drain Right Knee Accordion 10 [place] (Active)   Number of days:0      Time of void PreOp Void Prior to Procedure: 0622 (01/04/18 0616)    PostOp     Bladder Scan     PO    tolerating sips     Vitals    B/P: 134/82  T: 98.1  F (36.7  C)    Temp src: Axillary  P:       Heart Rate: 79 (01/04/18 1030)     R: 21  O2:  SpO2: 92 %    O2 Device: None (Room air) (01/04/18 1030)    Oxygen Delivery: 7 LPM (01/04/18 1000)         Family/support present no - updated   Patient belongings Patient Belongings: clothing;shoes;glasses  Disposition of Belongings: Locker   Patient transported on bed   DC meds/scripts (obs/outpt) Not applicable     Special needs/considerations None   Tasks needing completion floor  orders, x-rays       Gregoria Plata, RN  [facility] [id number]

## 2018-01-04 NOTE — PLAN OF CARE
Problem: Patient Care Overview  Goal: Plan of Care/Patient Progress Review  Discharge Planner PT   Patient plan for discharge: Home with OP PT   Current status: PT evaluation completed.  Supine to sitting with HOB elevated and SBA x 1.  Sit to/from standing from EOB with FWW and CGA x 1.  Pt ambulated 50' with FWW and CGA x 1.  Pt tolerate TKA exercises well and knee ROM 0-6-82 degrees.    Barriers to return to prior living situation: No barriers to discharge home.    Recommendations for discharge: Home with OP PT.   Rationale for recommendations: Pt would benefit from OP PT for LE strengthening/ROM and gait training.        Entered by: Lexie Mccormack 01/04/2018 3:58 PM

## 2018-01-04 NOTE — ANESTHESIA PREPROCEDURE EVALUATION
Anesthesia Evaluation     . Pt has had prior anesthetic. Type: General    No history of anesthetic complications          ROS/MED HX    ENT/Pulmonary:     (+)sleep apnea, tobacco use, Past use uses CPAP , recent URI resolved . .    Neurologic: Comment: Spinal stenosis s/p lumbar fusion at L4-5 with hardware in place.    (+)seizures     Cardiovascular:     (+) Dyslipidemia, hypertension-range: 140s/90s in pre op, ---. : . . . :. .       METS/Exercise Tolerance:     Hematologic: Comments: Hemoglobin 14.8        Musculoskeletal:         GI/Hepatic:  - neg GI/hepatic ROS       Renal/Genitourinary:  - ROS Renal section negative       Endo: Comment: BMI 29.47        Psychiatric:         Infectious Disease:  - neg infectious disease ROS       Malignancy:         Other:                     Physical Exam  Normal systems: dental    Airway   Mallampati: II  TM distance: >3 FB  Neck ROM: full    Dental     Cardiovascular       Pulmonary                     Anesthesia Plan      History & Physical Review  History and physical reviewed and following examination; no interval change.    ASA Status:  2 .    NPO Status:  > 8 hours and > 2 hours    Plan for General and LMA with Intravenous induction. Maintenance will be Inhalation.      Discussed risks, benefits, and alternatives of general anesthesia with the patient. Risks discussed included nausea/vomiting, sore throat, dental damage, soft tissue damage, problems with heart, brain, lungs, and rare allergic reactions. Patient was given a chance to ask questions. Patient consents to procedure.     Discussed general vs. Spinal anesthesia in the setting of lumbar fusion. Patient prefers general anesthesia.       Postoperative Care  Postoperative pain management:  Peripheral nerve block (Single Shot), IV analgesics, Oral pain medications and Multi-modal analgesia.      Consents  Anesthetic plan, risks, benefits and alternatives discussed with:  Patient and Spouse..                           .

## 2018-01-04 NOTE — OP NOTE
DATE OF PROCEDURE:  1/4/2017      PREOPERATIVE DIAGNOSIS:  Tricompartmental osteoarthritis, right knee.      POSTOPERATIVE DIAGNOSIS:  Tricompartmental osteoarthritis, right knee.      PROCEDURE:  Right total knee arthroplasty with computer navigation.      SURGEON:  Jaci.      FIRST ASSISTANT:  Daniela.  It was medically necessary for a physician assistant to assist in the surgical care of this patient.  There was no resident available for assistance.        ESTIMATED BLOOD LOSS:  Less than 25 mL.        FLUID REPLACEMENT:  1000 mL of crystalloid.      DESCRIPTION OF PROCEDURE:  Under general anesthesia in the supine position, the right knee was prepped and draped in the usual sterile fashion.  Pause for the cause occurred and all present were in agreement.  The limb was exsanguinated and tourniquet inflated to 300 mmHg.  A 20 cm longitudinal anteromedial incision was made with a median parapatellar arthrotomy.  We found tricompartmental osteoarthritis of a severe nature.  There was a 10 degree flexion contracture and 12 degrees of varus.  Computer navigation was established demonstrating abnormal kinematic curves and a flexion contracture with varus deformity.  After registration, the distal femoral cut was made with an additional 2 mm resection proximal tibia was cut in the routine fashion.  The ACL and PCL were sacrificed.  Four in 1 cutting block was used on the femur and osteophytes were removed diffusely.  Medial and lateral menisci were removed.  Posterior capsular injection was accomplished.  We measured for and trialled a #7 femur, a #7 tibia and we ended up with a minor medial collateral ligament release and 13 mm PS insert.  We used a 7 PS femur and 7 tibia and a 7/13 PS insert with a 35 mm patellar resection button covering.  We used a double batch of tobramycin Simplex cement to cement the 3 components and again tested the alignment, range of motion and kinematics all of which were improved to  essentially normal with matching flexion, extension gap.  Ended up with 1-1/2 degrees of varus and improvement in kinematic curves and matching flexion, extension gap.  Full extension was achieved with these.  We irrigated and closed in layers over a deep Hemovac drain with a compressive bandage applied and the patient returning to recovery in good condition.         BLAYNE CARLTON MD             D: 2018 11:53   T: 2018 12:22   MT: RADHA      Name:     REGINALD KELLY   MRN:      [id number]        Account:        [id number]   :      1955           Procedure Date: 2018      Document: [id number]

## 2018-01-04 NOTE — CONSULTS
INTERNAL MEDICINE CONSULTATION     REQUESTING PHYSICIAN: Marcello Beatty MD    REASON FOR CONSULTATION: For recommendations for medical comorbidities.     RECOMMENDATIONS ;  Kareem Bai is a 62 year old male admitted on 1/4/2018 after knee surgery    # *Right TKA  .   . Preop Hb 14.8  Analgesia: per ortho   DVT prophylaxis:- per ortho   Abx and wound care as per primary team.   Intensive spirometry to prevent atelectasis     # HTN ; at home on lisinopril 20/ HCTZ 25 mg . Hold for now     #HLD;continue Simvastatin       Thank you for the consult.      Dr Germania Naidu  Hospitalist ( Internal medicine)  Pager: [phone]      CHIEF COMPLAINT: 62 year old year old male admitted after Right TKA    HISTORY OF PRESENT ILLNESS:   62 year old year old male   admitted on 1/4/2018 after TKA (for further details for indication of surgery and operative note, please refer to Marcello Beatty MD note) . Had 25 EBL 1000, fluids given  No reported  hypotension/ hypoxemia intra/post operative.      Currently: Denies any chest pain, shortness of breath or palpitations. Denies any nausea, vomiting or bowel symptoms.Denies any dysuria or frequency of urination.    PAST MEDICAL HISTORY:   Past Medical History:   Diagnosis Date     Sleep apnea      History reviewed. No pertinent surgical history.    History reviewed. No pertinent family history.   No CAD, no DM    Social History     Social History     Marital status:      Spouse name: N/A     Number of children: N/A     Years of education: N/A     Social History Main Topics     Smoking status: Current Some Day Smoker     Types: Cigars     Smokeless tobacco: Never Used      Comment: 5-6/week     Alcohol use Yes      Comment: 3-4 mixed drinks/night.     Drug use: No     Sexual activity: Not Asked     Other Topics Concern     None     Social History Narrative       ALLERGIES:   Allergies   Allergen Reactions     Ampicillin Other (See Comments) and Hives     Codeine GI  "Disturbance     LW Reaction: gi upset     Simvastatin Other (See Comments)       HOME MEDICATIONS:     No current facility-administered medications on file prior to encounter.   Current Outpatient Prescriptions on File Prior to Encounter:  ibuprofen (ADVIL/MOTRIN) 800 MG tablet Take 800 mg by mouth   lisinopril-hydrochlorothiazide (PRINZIDE/ZESTORETIC) 20-25 MG per tablet    pravastatin (PRAVACHOL) 10 MG tablet TAKE ONE TABLET BY MOUTH AT BEDTIME       REVIEW OF SYSTEMS: A comprehensive 10-point review of systems was done and was found negative unless mentioned in the HPI.     PHYSICAL EXAMINATION:   Vitals: /71  Temp 96.6  F (35.9  C) (Oral)  Resp 16  Ht 1.854 m (6' 1\")  Wt 101.1 kg (222 lb 14.2 oz)  SpO2 95%  BMI 29.41 kg/m2  BMI= Body mass index is 29.41 kg/(m^2).  GENERAL: Pleasant, interactive  HEENT: PERRLA. Sclerae  anicteric. Oral mucosa moist.  oral hygiene adequate.   NECK: Supple. No thyromegaly.   CARDIOVASCULAR: Normal S1 and S2. No murmurs, rubs or gallops.   RESPIRATORY: Normal vesicular breath sounds. No wheezing or crackles.   ABDOMEN: Soft, nontender. No guarding, rigidity or rebound. No                        hepatosplenomegaly. Bowel sounds are positive.   EXTREMITIES: right knee dressed   NEUROLOGIC: Cranial nerves II-XII are grossly intact. Alert and oriented x3.   SKIN: No petechia, purpura or rash.   HEMATOLOGIC: No gross lymphadenopathy.     LABORATORY DATA:   Recent Results (from the past 24 hour(s))   ABO/Rh type and screen    Collection Time: 01/04/18  5:56 AM   Result Value Ref [place]    ABO A     RH(D) Pos     Antibody Screen Neg     Test Valid Only At          [facility]    Specimen Expires 01/07/2018    Hemoglobin    Collection Time: 01/04/18  5:56 AM   Result Value Ref [place]    Hemoglobin 14.8 13.3 - 17.7 g/dL   Glucose    Collection Time: 01/04/18  5:56 AM   Result Value Ref [place]    Glucose 105 (H) 70 - 99 mg/dL   UA with " Microscopic reflex to Culture    Collection Time: 01/04/18  6:00 AM   Result Value Ref [place]    Color Urine Yellow     Appearance Urine Clear     Glucose Urine Negative NEG^Negative mg/dL    Bilirubin Urine Negative NEG^Negative    Ketones Urine Negative NEG^Negative mg/dL    Specific Gravity Urine 1.022 1.003 - 1.035    Blood Urine Negative NEG^Negative    pH Urine 5.5 5.0 - 7.0 pH    Protein Albumin Urine Negative NEG^Negative mg/dL    Urobilinogen mg/dL Normal 0.0 - 2.0 mg/dL    Nitrite Urine Negative NEG^Negative    Leukocyte Esterase Urine Trace (A) NEG^Negative    Source Midstream Urine     WBC Urine <1 0 - 2 /HPF    RBC Urine 1 0 - 2 /HPF    Mucous Urine Present (A) NEG^Negative /LPF

## 2018-01-04 NOTE — IP AVS SNAPSHOT
UR 8A    [address]    [facility] [address]    Phone:  [phone]                                       After Visit Summary   1/4/2018    Kareem Bai    MRN: [id number]           After Visit Summary Signature Page     I have received my discharge instructions, and my questions have been answered. I have discussed any challenges I see with this plan with the nurse or doctor.    ..........................................................................................................................................  Patient/Patient Representative Signature      ..........................................................................................................................................  Patient Representative Print Name and Relationship to Patient    ..................................................               ................................................  Date                                            Time    ..........................................................................................................................................  Reviewed by Signature/Title    ...................................................              ..............................................  Date                                                            Time

## 2018-01-04 NOTE — LETTER
Transition Communication Hand-off for Care Transitions to Next Level of Care Provider    Name: Kareem Bai  MRN #: [id number]  Primary Care Provider: [facility] [place]     Primary Clinic: [address]  [facility] [address]     Reason for Hospitalization:  Right Knee Osteoarthritis  Hx of total knee arthroplasty  Admit Date/Time: 1/4/2018  5:34 AM  Discharge Date: 1/5/2018  Payor Source: Payor: BCBS / Plan: FEDERAL EMPLOYEE PROGRAM / Product Type: PPO /          Reason for Communication Hand-off Referral: Avoidable readmission within 30 days    Discharge Needs Assessment:  Needs       Most Recent Value    Equipment Currently Used at Home none    Transportation Available car, family or friend will provide        Follow-up specialty is recommended: Yes    Follow-up plan:  Future Appointments  Date Time Provider Department Center   1/5/2018 1:00 PM Cristina Vogt, CRYSTAL [facility] [place]   1/5/2018 2:45 PM Lexie Mccormack Pt, PT [facility] [place]   1/6/2018 8:30 AM Ray Serrano, PTA [facility] [place]   1/6/2018 1:00 PM Ray Serrano, PTA [facility] [place]   1/18/2018 8:45 AM Magda Rowan APRN CNP [facility]   2/12/2018 10:45 AM Marcello Beatty MD [facility]       Any outstanding tests or procedures:        Referrals     Future Labs/Procedures    Physical Therapy Referral     Comments:    *This therapy referral will be filtered to a centralized scheduling office at [facility] and the patient will receive a call to schedule an appointment at a [place] location most convenient for them. *      [facility] provides Physical Therapy evaluation and treatment and many specialty services across the [facility].  If requesting a specialty program, please choose from the list below.    If you have not heard from the scheduling office within 2 business days, please call [phone] for all locations, with the exception of [place], please call  "[phone].  Treatment: Evaluation & Treatment  Special Instructions/Modalities: none  Special Programs:  none    Please be aware that coverage of these services is subject to the terms and limitations of your health insurance plan.  Call member services at your health plan with any benefit or coverage questions.      **Note to Provider:  If you are referring outside of [place] for the therapy appointment, please list the name of the location in the \"special instructions\" above, print the referral and give to the patient to schedule the appointment.    Physical Therapy Referral     Comments:    [facility] Physical Therapy  [address]  [address]  [place] [address]  Phone: [phone]  Fax: [phone]      Treatment: Evaluation & Treatment  Special Instructions/Modalities: None  Special Programs: None    Please be aware that coverage of these services is subject to the terms and limitations of your health insurance plan.  Call member services at your health plan with any benefit or coverage questions.      **Note to Provider:  If you are referring outside of [place] for the therapy appointment, please list the name of the location in the \"special instructions\" above, print the referral and give to the patient to schedule the appointment.            Cristine Braswell RN, BSN  Care Coordinator, 8A  Phone [phone]  Pager [phone]    AVS/Discharge Summary is the source of truth; this is a helpful guide for improved communication of patient story          "

## 2018-01-04 NOTE — ANESTHESIA PROCEDURE NOTES
Peripheral Nerve Block Procedure Note    Staff:     Anesthesiologist:  CALVIN MARCH    Resident/CRNA:  EH HATHAWAY    Block performed by resident/CRNA in the presence of a teaching physician    Location: Pre-op  Procedure Start/Stop TImes:      1/4/2018 6:52 AM     1/4/2018 7:04 AM    patient identified, IV checked, site marked, risks and benefits discussed, informed consent, monitors and equipment checked, pre-op evaluation, at physician/surgeon's request and post-op pain management      Correct Patient: Yes      Correct Position: Yes      Correct Site: Yes      Correct Procedure: Yes      Correct Laterality:  Yes    Site Marked:  Yes  Procedure details:     Procedure:  Adductor canal    ASA:  2    Diagnosis:  Post op pain    Laterality:  Right    Position:  Supine    Sterile Prep: chloraprep, mask and sterile gloves      Local skin infiltration:  1% lidocaine    amount (mL):  2    Needle:  Other    Needle gauge:  22    Needle length (mm):  80    Ultrasound: Yes      Ultrasound used to identify targeted nerve, plexus, or vascular structure and placed a needle adjacent to it      Permanent Image entered into patiient's record      Abnormal pain on injection: No      Blood Aspirated: No      Paresthesias:  No    Bolus via:  Needle    Infusion Method:  Single Shot    Complications:  None  Assessment/Narrative:      10 mL Exparel and 5 mL Bupivacaine with [id number] epinephrine injected in Right adductor canal

## 2018-01-04 NOTE — BRIEF OP NOTE
Pre and post op diagnosis: r knee OA  Surgery  R TKA  Surgeon Jaci Suarez  EBL <25 cc  FR 1000 cc crystalloid  One hemovac

## 2018-01-04 NOTE — IP AVS SNAPSHOT
MRN:[id number]                      After Visit Summary   1/4/2018    Kareem Bai    MRN: [id number]           Thank you!     Thank you for choosing [facility] for your care. Our goal is always to provide you with excellent care. Hearing back from our patients is one way we can continue to improve our services. Please take a few minutes to complete the written survey that you may receive in the mail after you visit with us. Thank you!        Patient Information     Date Of Birth          1955        Designated Caregiver       Most Recent Value    Caregiver    Will someone help with your care after discharge? yes    Name of designated caregiver Sara    Phone number of caregiver [phone]    Caregiver address [address] [place] [address]      About your hospital stay     You were admitted on:  January 4, 2018 You last received care in the:   8A    You were discharged on:  January 7, 2018        Reason for your hospital stay       You were admitted to the hospital following your total knee arthroplasty.                  Who to Call     For medical emergencies, please call 911.  For non-urgent questions about your medical care, please call your primary care provider or clinic, [phone]  For questions related to your surgery, please call your surgery clinic        Attending Provider     Provider Specialty    Marcello Beatty MD Orthopedics       Primary Care Provider Office Phone # Fax #    [facility] [phone]       When to contact your care team       CALL YOUR PHYSICIAN IF:  -Pain in your hip persists or worsens in the first few days after surgery.  -Excessive redness or drainage of cloudy or bloody material from the wounds (Clear red tinted fluid and some mild drainage should be expected). Drainage of any kind 5 days after surgery should be reported to the doctor.  -You have a temperature elevation greater than 101.5    -You have  pain, swelling or redness in your calf.  -You have numbness or weakness in your leg or foot.      You may contact your physician at [phone] during business hours.    For after hours or weekend calls, you may contact the hospital at [phone] and ask for the on-call orthopedic resident                  After Care Instructions     Activity       Your activity upon discharge: WBAT.  Continue your physical therapy exercises at home.            Diet       You may return to your regular, pre-surgery diet unless instructed otherwise by your provider.            Discharge Instructions       TOTAL KNEE ARTHROPLASTY POSTOPERATIVE INSTRUCTIONS    A.  COMFORT:  -Elevation: Elevate your knee and ankle above the level of your heart. The best position is lying down with two pillows lengthwise under your entire leg. Do not place pillows under your knee. This should be done for the first several days after surgery.  -Swelling: An ice pack will be provided to control swelling and discomfort. Place a thin towel between your skin and the ice pack and apply for 20 minutes.   -Pain Medication: Take medication as prescribed, but only as often as necessary.  Avoid alcohol and driving if you are taking pain medication. Remember that Tylenol can be used for pain relief as well, as you wean off the narcotics.  Maximum Tylenol dose for a single day is 4000 mg.            B.  ACTIVITIES:  -Exercises: Point and flex your feet and wiggle your toes, move your ankle around in a [place], to help prevent complications such as blood clotting in your legs. Quad muscle isometric and short arc exercises should begin the day of surgery and should be done for 10 to 15 minutes, 3 times a day, for the first few weeks after surgery. Patient to focus on full hyperextension to 90 degrees (or greater) of knee flexion.    -Weight bearing status: You may put weight on your operative leg (weight bearing as tolerated) but may be limited due to pain. Walk  using assistive devices as needed.   -Physical therapy: A prescription for physical therapy will be administered  -Driving: Driving is NOT permitted until allowed by your provider.  -Athletic activities: Athletic activities, such as swimming, bicycling, jogging, running and stop-and-go-sports should be avoided until allowed by your doctor.  -Return to work: May returned to work when allowed by your provider.     C. INCISION CARE:    -Keep the initial post-op dressing on, as discussed above. You may shower 48 hours after surgery, however the dressing on your knee should remain in place during showering. It is acceptable for water to run over the dressing, but you are not to soak or submerge your wound underwater. The steri-strips (small white tape that is directly on the incision areas) should be left on until they fall off or are removed at your first office visit.            Wound care and dressings       You have a clean Aquacel dressing on your surgical wound. This dressing should stay in place for 7 days to allow the incision to heal. After 7 days, you may change the dressing. Please use sterile 4x4 gauze dressings with tape over top to secure the dressing. If the dressing becomes wet or saturated with wound drainage, it is appropriate to change the dressing. If drainage persists from the incision site to the extent that it is frequently saturating the dressing, please contact your clinic nurse.                  Follow-up Appointments     Adult [facility] Follow-up and recommended labs and tests       You are to return to clinic in 2 weeks for wound check with the clinic nurse. Approximately 6 weeks after your surgery, you will be scheduled for an appointment with Dr. Beatty. At this time, AP and lateral knee imaging will be obtained.    If you need to schedule your 2 and 6 week postoperative visits or haven't received confirmation regarding these visits, please call one of the following numbers within 7 days of  "discharge.    For patients that see Dr. Beatty at:   -[facility]: [phone]  -[facility] [place]: [phone]  [facility]: [phone]                  Your next 10 appointments already scheduled     Jan 18, 2018  8:45 AM CST   (Arrive by 8:30 AM)   Return Visit with REE Laura CNP   [facility] ([facility])    [address] [place] [address] [phone]            Feb 12, 2018 10:45 AM CST   (Arrive by 10:30 AM)   Return Visit with Marcello Beatty MD   [facility] ([facility])    [address] [place] [address] [phone]              Additional Services     Physical Therapy Referral       [facility] Physical Therapy  [facility]  [address]  [place] [address]  Phone: [phone]  Fax: [phone]      Treatment: Evaluation & Treatment  Special Instructions/Modalities: None  Special Programs: None    Please be aware that coverage of these services is subject to the terms and limitations of your health insurance plan.  Call member services at your health plan with any benefit or coverage questions.      **Note to Provider:  If you are referring outside of [place] for the therapy appointment, please list the name of the location in the \"special instructions\" above, print the referral and give to the patient to schedule the appointment.                  Future tests that were ordered for you     Assign Questionnaire Series to Patient                 Pending Results     No orders found from 1/2/2018 to 1/5/2018.            Statement of Approval     Ordered          01/07/18 1002  I have reviewed and agree with all the recommendations and orders detailed in this document.  EFFECTIVE NOW     Approved and electronically signed by:  Marcello Beatty MD             Admission Information     Date " "& Time Provider Department Dept. Phone    2018 Marcello Beatty MD  8A [phone]      Your Vitals Were     Blood Pressure Pulse Temperature Respirations Height Weight    137/69 (BP Location: Right arm) 62 97.1  F (36.2  C) (Oral) 18 1.85 m (6' 0.83\") 101.1 kg (222 lb 14.2 oz)    Pulse Oximetry BMI (Body Mass Index)                94% 29.54 kg/m2          HESKA Information     HESKA lets you send messages to your doctor, view your test results, renew your prescriptions, schedule appointments and more. To sign up, go to [email or web address] . Click on \"Log in\" on the left side of the screen, which will take you to the Welcome page. Then click on \"Sign up Now\" on the right side of the page.     You will be asked to enter the access code listed below, as well as some personal information. Please follow the directions to create your username and password.     Your access code is: [email or web address]  Expires: 2018  6:30 AM     Your access code will  in 90 days. If you need help or a new code, please call your [place] clinic or [phone].        Care EveryWhere ID     This is your Care EveryWhere ID. This could be used by other organizations to access your [place] medical records  [id number]        Equal Access to Services     [facility] HospitalJOSE M AH: Hadii steven brysono [place], waaxda luqadaha, qaybta kaalmada ademadihayada, jasbir artis . So [facility] [phone].    ATENCIÓN: Si habla español, tiene a guaman disposición servicios gratuitos de asistencia lingüística. Llame al [phone].    We comply with applicable federal civil rights laws and [place] laws. We do not discriminate on the basis of race, color, national origin, age, disability, sex, sexual orientation, or gender identity.               Review of your medicines      START taking        Dose / Directions    aspirin - buffered 325 MG Tabs tablet   Commonly known as:  ASCRIPTIN        Dose:  325 mg   Take 1 tablet " (325 mg) by mouth daily   Quantity:  28 each   Refills:  0       oxyCODONE IR 5 MG tablet   Commonly known as:  ROXICODONE        Dose:  5-10 mg   Take 1-2 tablets (5-10 mg) by mouth every 3 hours as needed for moderate to severe pain   Quantity:  60 tablet   Refills:  0         CONTINUE these medicines which have NOT CHANGED        Dose / Directions    ibuprofen 800 MG tablet   Commonly known as:  ADVIL/MOTRIN        Dose:  800 mg   Take 800 mg by mouth   Refills:  0       lisinopril-hydrochlorothiazide 20-25 MG per tablet   Commonly known as:  PRINZIDE/ZESTORETIC        Refills:  0       pravastatin 10 MG tablet   Commonly known as:  PRAVACHOL        TAKE ONE TABLET BY MOUTH AT BEDTIME   Refills:  0            Where to get your medicines      These medications were sent to [facility] [address]  [address] [address]     Phone:  [phone]     aspirin - buffered 325 MG Tabs tablet         Some of these will need a paper prescription and others can be bought over the counter. Ask your nurse if you have questions.     Bring a paper prescription for each of these medications     oxyCODONE IR 5 MG tablet                Protect others around you: Learn how to safely use, store and throw away your medicines at www.disposemymeds.org.             Medication List: This is a list of all your medications and when to take them. Check marks below indicate your daily home schedule. Keep this list as a reference.      Medications           Morning Afternoon Evening Bedtime As Needed    aspirin - buffered 325 MG Tabs tablet   Commonly known as:  ASCRIPTIN   Take 1 tablet (325 mg) by mouth daily   Last time this was given:  325 mg on 1/7/2018  8:01 AM                                ibuprofen 800 MG tablet   Commonly known as:  ADVIL/MOTRIN   Take 800 mg by mouth                                lisinopril-hydrochlorothiazide 20-25 MG per tablet   Commonly known as:   PRINZIDE/ZESTORETIC                                oxyCODONE IR 5 MG tablet   Commonly known as:  ROXICODONE   Take 1-2 tablets (5-10 mg) by mouth every 3 hours as needed for moderate to severe pain   Last time this was given:  10 mg on 1/7/2018  8:07 AM                                pravastatin 10 MG tablet   Commonly known as:  PRAVACHOL   TAKE ONE TABLET BY MOUTH AT BEDTIME   Last time this was given:  10 mg on 1/7/2018  8:02 AM

## 2018-01-04 NOTE — PLAN OF CARE
Problem: Knee Arthroplasty (Total, Partial) (Adult)  Goal: Signs and Symptoms of Listed Potential Problems Will be Absent, Minimized or Managed (Knee Arthroplasty)  Signs and symptoms of listed potential problems will be absent, minimized or managed by discharge/transition of care (reference Knee Arthroplasty (Total, Partial) (Adult) CPG).   Outcome: Improving  Pt arrived to unit at 1200 and was oriented to room and call light  VS: VSS   O2: >90% on 1.5L    Output: Bladder scanned for 300; will encourage pt to void   Last BM: 1/4   Activity: WBAT; about to ambulate with PT   Skin: Intact except for incision, HV, and bilateral great toe wounds (present PTA)   Pain: Managed with 5mg oxycodone x2   CMS: Baseline NT in BLE   Dressing: CDI   Diet: Advanced to regular which pt tolerated well   LDA: PIV infusing/SL; HV patent   Equipment: CPM, HV, PCDs, Jay, capnography, IV pole, FWW   Plan: TBD   Additional Info: Pt received general anesthesia + exparel + cocktail. EBL 30

## 2018-01-05 ENCOUNTER — APPOINTMENT (OUTPATIENT)
Dept: PHYSICAL THERAPY | Facility: CLINIC | Age: 63
End: 2018-01-05
Attending: ORTHOPAEDIC SURGERY
Payer: COMMERCIAL

## 2018-01-05 ENCOUNTER — APPOINTMENT (OUTPATIENT)
Dept: OCCUPATIONAL THERAPY | Facility: CLINIC | Age: 63
End: 2018-01-05
Attending: ORTHOPAEDIC SURGERY
Payer: COMMERCIAL

## 2018-01-05 LAB — HGB BLD-MCNC: 13.1 G/DL (ref 13.3–17.7)

## 2018-01-05 PROCEDURE — 12000001 ZZH R&B MED SURG/OB UMMC

## 2018-01-05 PROCEDURE — 25000132 ZZH RX MED GY IP 250 OP 250 PS 637: Performed by: INTERNAL MEDICINE

## 2018-01-05 PROCEDURE — 25000125 ZZHC RX 250: Performed by: STUDENT IN AN ORGANIZED HEALTH CARE EDUCATION/TRAINING PROGRAM

## 2018-01-05 PROCEDURE — 40000193 ZZH STATISTIC PT WARD VISIT: Performed by: PHYSICAL THERAPIST

## 2018-01-05 PROCEDURE — 97535 SELF CARE MNGMENT TRAINING: CPT | Mod: GO

## 2018-01-05 PROCEDURE — 40000133 ZZH STATISTIC OT WARD VISIT

## 2018-01-05 PROCEDURE — 97530 THERAPEUTIC ACTIVITIES: CPT | Mod: GO

## 2018-01-05 PROCEDURE — 99231 SBSQ HOSP IP/OBS SF/LOW 25: CPT | Performed by: NURSE PRACTITIONER

## 2018-01-05 PROCEDURE — 25000128 H RX IP 250 OP 636: Performed by: ORTHOPAEDIC SURGERY

## 2018-01-05 PROCEDURE — 97110 THERAPEUTIC EXERCISES: CPT | Mod: GP | Performed by: PHYSICAL THERAPIST

## 2018-01-05 PROCEDURE — 97165 OT EVAL LOW COMPLEX 30 MIN: CPT | Mod: GO

## 2018-01-05 PROCEDURE — 97116 GAIT TRAINING THERAPY: CPT | Mod: GP | Performed by: PHYSICAL THERAPIST

## 2018-01-05 PROCEDURE — 25000132 ZZH RX MED GY IP 250 OP 250 PS 637: Performed by: ORTHOPAEDIC SURGERY

## 2018-01-05 PROCEDURE — 97530 THERAPEUTIC ACTIVITIES: CPT | Mod: GP | Performed by: PHYSICAL THERAPIST

## 2018-01-05 PROCEDURE — 85018 HEMOGLOBIN: CPT | Performed by: ORTHOPAEDIC SURGERY

## 2018-01-05 PROCEDURE — 36415 COLL VENOUS BLD VENIPUNCTURE: CPT | Performed by: ORTHOPAEDIC SURGERY

## 2018-01-05 RX ORDER — OXYCODONE HYDROCHLORIDE 5 MG/1
5-10 TABLET ORAL
Qty: 60 TABLET | Refills: 0 | Status: SHIPPED | OUTPATIENT
Start: 2018-01-05 | End: 2018-01-10

## 2018-01-05 RX ORDER — ASPIRIN 325 MG/1
325 TABLET, FILM COATED ORAL DAILY
Qty: 28 EACH | Refills: 0 | Status: ON HOLD | OUTPATIENT
Start: 2018-01-05 | End: 2021-10-12

## 2018-01-05 RX ORDER — CLINDAMYCIN PHOSPHATE 900 MG/50ML
900 INJECTION, SOLUTION INTRAVENOUS EVERY 8 HOURS
Status: COMPLETED | OUTPATIENT
Start: 2018-01-05 | End: 2018-01-05

## 2018-01-05 RX ADMIN — OXYCODONE HYDROCHLORIDE 10 MG: 5 TABLET ORAL at 12:41

## 2018-01-05 RX ADMIN — OXYCODONE HYDROCHLORIDE 10 MG: 5 TABLET ORAL at 02:42

## 2018-01-05 RX ADMIN — SENNOSIDES AND DOCUSATE SODIUM 2 TABLET: 8.6; 5 TABLET ORAL at 07:58

## 2018-01-05 RX ADMIN — OXYCODONE HYDROCHLORIDE 10 MG: 5 TABLET ORAL at 22:15

## 2018-01-05 RX ADMIN — ASPIRIN 325 MG: 325 TABLET, FILM COATED ORAL at 07:58

## 2018-01-05 RX ADMIN — OXYCODONE HYDROCHLORIDE 10 MG: 5 TABLET ORAL at 15:51

## 2018-01-05 RX ADMIN — SENNOSIDES AND DOCUSATE SODIUM 2 TABLET: 8.6; 5 TABLET ORAL at 19:02

## 2018-01-05 RX ADMIN — PRAVASTATIN SODIUM 10 MG: 10 TABLET ORAL at 07:58

## 2018-01-05 RX ADMIN — OXYCODONE HYDROCHLORIDE 10 MG: 5 TABLET ORAL at 09:17

## 2018-01-05 RX ADMIN — CLINDAMYCIN PHOSPHATE 900 MG: 18 INJECTION, SOLUTION INTRAVENOUS at 13:53

## 2018-01-05 RX ADMIN — CLINDAMYCIN PHOSPHATE 900 MG: 18 INJECTION, SOLUTION INTRAVENOUS at 05:33

## 2018-01-05 RX ADMIN — OXYCODONE HYDROCHLORIDE 10 MG: 5 TABLET ORAL at 19:02

## 2018-01-05 RX ADMIN — KETOROLAC TROMETHAMINE 30 MG: 30 INJECTION, SOLUTION INTRAMUSCULAR; INTRAVENOUS at 20:49

## 2018-01-05 RX ADMIN — OXYCODONE HYDROCHLORIDE 10 MG: 5 TABLET ORAL at 06:14

## 2018-01-05 NOTE — PROGRESS NOTES
"No new issues  No cp or sob   No fever or chills  No nausea or vomiting   Vital signs:  Temp: 98.4  F (36.9  C) Temp src: Oral BP: 133/72 Pulse: 66 Heart Rate: 72 Resp: 16 SpO2: 96 % O2 Device: None (Room air) Oxygen Delivery: 2 LPM Height: 185 cm (6' 0.83\") Weight: 101.1 kg (222 lb 14.2 oz)  Estimated body mass index is 29.54 kg/(m^2) as calculated from the following:    Height as of this encounter: 1.85 m (6' 0.83\").    Weight as of this encounter: 101.1 kg (222 lb 14.2 oz).  Perr, eomi  Neck;supple  Chest ; clear b/l  cvs ; RRR  Gi ; soft non tender, bs positive  Ext ; knee dressed   Labs; 11.6  A/P  Kareem Bai is a 62 year old male admitted on 1/4/2018 after knee surgery     # *Right TKA  .   . Preop Hb 14.8  Analgesia: per ortho   DVT prophylaxis:- per ortho   Abx and wound care as per primary team.   Intensive spirometry to prevent atelectasis      # HTN ; at home on lisinopril 20/ HCTZ 25 mg . Can resume on dc      #HLD;continue Simvastatin   #dispo ; per ortho       "

## 2018-01-05 NOTE — PLAN OF CARE
Problem: Knee Arthroplasty (Total, Partial) (Adult)  Goal: Signs and Symptoms of Listed Potential Problems Will be Absent, Minimized or Managed (Knee Arthroplasty)  Signs and symptoms of listed potential problems will be absent, minimized or managed by discharge/transition of care (reference Knee Arthroplasty (Total, Partial) (Adult) CPG).     VS: Pt alert and orientedx4. VSS and afebrile.Capno stable.    O2: O2 de sats at 88 while sleeping and with O2 at 2.5L.Has history of sleep apnea per pt report.    Output: Voids spontaneously without difficulty per pt report.    Last BM: 1/4/18, passing gas and bowels active in all quadrants.    Activity: Up to toilet with 1 assist.    Skin: Intact with exceptions of incision and wound at bilateral great toe (FIDEL).   Pain: Controlled with PRN Oxycodone, ice pack applied and tolerating well.   CMS: Numbness and tingling sensation on bilateral  LE as baseline due to neuropathy per patient.   Dressing: Ace wrap/dressing: CDI   Diet: Regular   LDA: New PIV inserted at R hand : patent and SL between ABx.    Equipment: CPM, IV pole, Capno   Plan: Continue plan of care.    Additional Info: Clindamycin 900mg IV q8 x2 doses started this shift.

## 2018-01-05 NOTE — PROGRESS NOTES
"[facility]IONAL ANESTHESIA PAIN SERVICE  SUBJECTIVE: Pt reports pain adequate controlled with analgesics and nerve block injection, rating pain 2/10 at rest and 3/10 with activity.  Patient is tolerating a diet and denies nausea.  Denies any weakness, paresthesias, circumoral numbness, metallic taste or tinnitus.       Clinically Aligned Pain Assessment (CAPA):  Comfort (How is your pain?): Comfortably manageable  Change in Pain (Since your last medication/intervention?): Getting better  Pain Control (How are your pain treatments working?):  Partially effective pain control    Medications related to Pain Management (Future)    Start     Dose/Rate Route Frequency Ordered Stop    01/07/18 0000  acetaminophen (TYLENOL) tablet 650 mg      650 mg Oral EVERY 4 HOURS PRN 01/04/18 1128      01/05/18 0800  bisacodyl (DULCOLAX) Suppository 10 mg      10 mg Rectal DAILY 01/04/18 1218      01/04/18 2000  senna-docusate (SENOKOT-S;PERICOLACE) 8.6-50 MG per tablet 1-2 tablet      1-2 tablet Oral 2 TIMES DAILY 01/04/18 1218      01/04/18 1800  ketorolac (TORADOL) injection 30 mg      30 mg Intravenous EVERY 6 HOURS PRN 01/04/18 1128 01/09/18 1759    01/04/18 1218  lidocaine 1 % 1 mL      1 mL Other EVERY 1 HOUR PRN 01/04/18 1218      01/04/18 1218  lidocaine (LMX4) kit       Topical EVERY 1 HOUR PRN 01/04/18 1218      01/04/18 1130  aspirin - buffered (ASCRIPTIN) 325 MG tablet 325 mg      325 mg Oral DAILY 01/04/18 1128      01/04/18 1128  HYDROmorphone (PF) (DILAUDID) injection 0.3-0.5 mg      0.3-0.5 mg Intravenous EVERY 2 HOURS PRN 01/04/18 1128      01/04/18 1128  oxyCODONE IR (ROXICODONE) tablet 5-10 mg      5-10 mg Oral EVERY 3 HOURS PRN 01/04/18 1128      01/04/18 1005  bupivacaine liposome (EXPAREL) LONG ACTING injection was administered into the infiltration site to produce postsurgical analgesia. Duration of action is up to 72 hours, and other \"joel\" medications should not be given for 96 hours with the exception of the " "lidocaine 5% patch (LIDODERM) and the lidocaine 10mg in potassium infusions. This entry is for INFORMATION ONLY.       Does not apply CONTINUOUS PRN 01/04/18 1005 01/08/18 1004          OBJECTIVE:    Blood pressure 140/74, pulse 69, temperature 96.4  F (35.8  C), temperature source Oral, resp. rate 16, height 1.854 m (6' 1\"), weight 101.1 kg (222 lb 14.2 oz), SpO2 97 %.  Pt lying in bed NAD working with therapy.      ASSESSMENT/PLAN:    Kareem Bai is a 62 year old male with R) knee tricompartmental osteoarthritis, now POD #1 s/p  OPTICAL TRACKING SYSTEM ARTHROPLASTY KNEE with single shot R) adductor canal nerve block injection.  Total bupivacaine 0.5% with epinephrine 1:200,000 5mL total, then liposomal bupivacaine (Exparel) 1.3% 10mL total administered 1/4/18 for postop pain control.  Pt is ambulating with assistance.  No evidence of adverse side effects associated with R) adductor canal nerve block injection.  Pt acheiving adequate pain control, with nerve block, oral and IV analgesics.  Anticipate up to 72 hours of pain control with long-acting local anesthetic. Pt will continue to require opioid/nonopioid analgesics for visceral and muscle pain not controlled with local anesthetic.      - NO other local anesthetic use within 96 hours of liposomal bupivacaine (Exparel)  - patient received verbal and written instructions about liposomal bupivacaine and counseling about pharmacologic and nonpharmacologic measures for acute postoperative pain management  - please call if questions or concerns      REE Soni CNP  [facility]ional Anest[facility] Pain Service  1/5/2018 7:28 AM    Contact Info (for in-house use only):  Job code ID: [id number]   [place] 0599  Peds [phone]  imbookin ([place]) phone: dial * * * [phone], enter jobcode ID, then enter call-back number.    Text: Use [employer] on the Intranet <Paging/Directory> tab and enter Jobcode ID.   If no call back at any time, contact the hospital  and ask for RAPS " attending or backup

## 2018-01-05 NOTE — PLAN OF CARE
Problem: Knee Arthroplasty (Total, Partial) (Adult)  Goal: Signs and Symptoms of Listed Potential Problems Will be Absent, Minimized or Managed (Knee Arthroplasty)  Signs and symptoms of listed potential problems will be absent, minimized or managed by discharge/transition of care (reference Knee Arthroplasty (Total, Partial) (Adult) CPG).   Outcome: Improving  VS: VSS, incentive spirometry encouraged. CAPNO until 1/5 1200.    O2: >90% on 1.5L, increased to 2.5 at bedtime due to O2 dropping <90%.    Output: Voided 400 mL, bladder scanned for 0mL. Second time voided 500 mL.    Last BM: 1/4 per pt report, BS active   Activity: WBAT; ambulated with PT and to bathroom.    Skin: Intact except for incision, HV, and bilateral great toe wounds (present PTA)   Pain: Managed with 5mg oxycodone Q3H, given x2. Toradol available. Given 10 mg at bedtime.    CMS: Baseline NT in BLE, able to wiggle toes and DP +2 bilaterally.    Dressing: CDI   Diet: Advanced to regular which pt tolerated well   LDA: PIV infusing/SL; HV patent   Equipment: CPM, HV, PCDs, Jay, capnography, IV pole, FWW   Plan: TBD   Additional Info: Pt received general anesthesia + exparel + cocktail. EBL 30

## 2018-01-05 NOTE — PLAN OF CARE
Problem: Patient Care Overview  Goal: Individualization & Mutuality  Pt A/O X 4. Afebrile. VSS. Lungs- Clear bilaterally with both anterior and posterior. IS encouraged. Bowels- Hyperactive in all four quadrants. CMS and Neuro's are intact. Has baseline numbness and tingling in the BLE's (Neuropathy). Denies nausea, shortness of breath, and chest pain. Has pain in the right knee and given Oxycodone, ICE applied, and is tolerating well. Voids spontaneously without difficulty in the bathroom. Is on a Regular diet and appetite was Good this shift. Right knee incisional dressing is C/D/I. CPM is ON and set to 60 degrees flexion and 0 degrees extension. Each big toe bilaterally has a baseline scab wound that is C/D/I and open to air. Pt up in room with Assist X 1 and a FWW. PIV is patent in the right hand and saline locked. CASEY in place on the LLE, PCD's on BLE's. Bilateral heels are elevated off the bed. Pt is able to make needs known and the call light is within the pt's reach. Continue to monitor.

## 2018-01-05 NOTE — PROGRESS NOTES
"Orthopedic Surgery Progress Note    Subjective:   NAEO.  Pain well controlled.  (-)cp/sob, (-)n/v, (+)tolerating PO.  Block wearing off.    Exam:  /74  Pulse 69  Temp 96.4  F (35.8  C) (Oral)  Resp 16  Ht 1.854 m (6' 1\")  Wt 101.1 kg (222 lb 14.2 oz)  SpO2 97%  BMI 29.41 kg/m2  Gen: Awake, alert, NAD  Resp: breathing equal and non-labored  Extremities:  RLE:   Bandages c/d/i   SILT dp/sp/t/s/s nerve distributions, diffuse partial numbness, improving (r/t block)   DP 2+   Fires EHL/FHL/TA/Gsc 5/5 strength   No calf tenderness      Drain: 220 / 50    Labs:    Recent Labs  Lab 01/05/18  0653 01/04/18  0556   HGB 13.1* 14.8       Recent Labs  Lab 01/04/18  0556   *     No lab results found in last 7 days.      Assessment:   62 year old male s/p R primary TKA on 1/4/18 w Dr. Beatty.  Recovering appropriately.    Plan:  -Activity: WBAT   -PT/OT  -Drain: remain in place until drain output <25ml per shift  -Anticoagulation: ASA 3235 per day  -Antibiotics: clindamycin  -Pain control: po pain meds, iv for breakthrough  -Diet: ADAT  -Labs Needed: postop hgb    -Disposition:  Pending progress w PT  -Follow-up: 2 wks in clinic w Dr. Beatty.      Nelson Chaves MD  PGY-4, Orthopaedic Surgery  [phone]      "

## 2018-01-05 NOTE — PROGRESS NOTES
Care Coordinator- Discharge Planning     Admission Date/Time:  1/4/2018  Attending MD:  Marcello Beatty MD     Data  Date of initial CC assessment:  1/5/2018  Chart reviewed, discussed with interdisciplinary team.   Patient was admitted for:   1. History of total right knee replacement         Assessment  Concerns with insurance coverage for discharge needs: None.  Current Living Situation: Patient lives with spouse.  Support System: Supportive  Services Involved: Outpatient Rehab  Transportation: Family or Friend will provide  Barriers to Discharge: None        Coordination of Care and Referrals: Provided patient/family with options for Outpatient Rehab. Met with patient at bedside to discuss discharge planning. Per patient request a referral was sent to [facility], Phone: [phone], Fax: [phone]. Message left with [facility] to please call patient and make appointment. Referral faxed. No further discharge concerns. All therapy orders completed. CC to follow as needed.        Plan  Anticipated Discharge Date:  1/5/2018  Anticipated Discharge Plan:  Home with outpatient PT    CTS Handoff completed:  YES    Cristine Braswell RN, BSN  Care Coordinator, 8A  Phone [phone]  Pager [phone]

## 2018-01-05 NOTE — PROGRESS NOTES
01/05/18 1400   Quick Adds   Type of Visit Initial Occupational Therapy Evaluation   Living Environment   Lives With spouse   Living Arrangements house   Home Accessibility stairs to enter home;stairs within home;tub/shower is not walk in   Number of Stairs to Enter Home 3   Number of Stairs Within Home 13   Transportation Available family or friend will provide   Living Environment Comment multi level home. Standard toilet. Tub.   Self-Care   Dominant Hand right   Usual Activity Tolerance good   Current Activity Tolerance fair   Regular Exercise no   Equipment Currently Used at Home none   Functional Level Prior   Ambulation 0-->independent   Transferring 0-->independent   Toileting 0-->independent   Bathing 0-->independent   Dressing 0-->independent   Eating 0-->independent   Communication 0-->understands/communicates without difficulty   Swallowing 0-->swallows foods/liquids without difficulty   Cognition 0 - no cognition issues reported   Fall history within last six months no   Prior Functional Level Comment IND with mobility and ADLs at baseline.   General Information   Onset of Illness/Injury or Date of Surgery - Date 01/04/18   Referring Physician Dr. Beatty   Patient/Family Goals Statement Get back to moving around better   Additional Occupational Profile Info/Pertinent History of Current Problem s/p R TKA   Precautions/Limitations fall precautions   Weight-Bearing Status - RLE weight-bearing as tolerated   General Observations Patient alert, agreeable to OT.    Cognitive Status Examination   Cognitive Comment no concerns   Visual Perception   Visual Perception No deficits were identified   Sensory Examination   Sensory Quick Adds No deficits were identified   Pain Assessment   Patient Currently in Pain Yes, see Vital Sign flowsheet   Range of Motion (ROM)   ROM Comment B UEs WFL   Strength   Strength Comments B UE's WFL   Muscle Tone Assessment   Muscle Tone Quick Adds No deficits were identified    Coordination   Upper Extremity Coordination No deficits were identified   Mobility   Bed Mobility Comments IND   Transfer Skill: Bed to Chair/Chair to Bed   Level of [place]: Bed to Chair stand-by assist   Physical Assist/Nonphysical Assist: Bed to Chair set-up required   Weight-Bearing Restrictions weight-bearing as tolerated   Assistive Device - Transfer Skill Bed to Chair Chair to Bed Rehab Eval rolling walker   Transfer Skill: Sit to Stand   Level of [place]: Sit/Stand stand-by assist   Physical Assist/Nonphysical Assist: Sit/Stand set-up required   Transfer Skill: Sit to Stand weight-bearing as tolerated   Assistive Device for Transfer: Sit/Stand rolling walker   Transfer Skill: Toilet Transfer   Level of [place]: Toilet stand-by assist   Physical Assist/Nonphysical Assist: Toilet set-up required;verbal cues   Weight-Bearing Restrictions: Toilet weight-bearing as tolerated   Assistive Device rolling walker   Tub/Shower Transfer   Tub/Shower Transfer Comments tx initiated, see daily doc   Upper Body Dressing   Level of [place]: Dress Upper Body independent   Physical Assist/Nonphysical Assist: Dress Upper Body set-up required   Lower Body Dressing   Level of [place]: Dress Lower Body stand-by assist   Physical Assist/Nonphysical Assist: Dress Lower Body set-up required   Toileting   Level of [place]: Toilet stand-by assist   Physical Assist/Nonphysical Assist: Toilet set-up required   Grooming   Level of [place]: Grooming independent   Physical Assist/Nonphysical Assist: Grooming set-up required   Eating/Self Feeding   Level of [place]: Eating independent   Activities of Daily Living Analysis   Impairments Contributing to Impaired Activities of Daily Living balance impaired;pain;ROM decreased;strength decreased   General Therapy Interventions   Planned Therapy Interventions ADL retraining;transfer training   Clinical Impression   Criteria for Skilled Therapeutic  "Interventions Met yes, treatment indicated   OT Diagnosis Decreased functional mobility and ADLs   Influenced by the following impairments pain, decreased strength and ROM R LE   Assessment of Occupational Performance 1-3 Performance Deficits   Identified Performance Deficits bathing, home mgmt   Clinical Decision Making (Complexity) Low complexity   Therapy Frequency (one time eval and treat)   Predicted Duration of Therapy Intervention (days/wks) 1 day   Anticipated Equipment Needs at Discharge shower chair   Anticipated Discharge Disposition Home with Assist   Risks and Benefits of Treatment have been explained. Yes   Patient, Family & other staff in agreement with plan of care Yes   [facility] TM \"6 Clicks\"   2016, Trustees of [facility], under license to [employer].  All rights reserved.   6 Clicks Short Forms Daily Activity Inpatient Short Form   [facility]  \"6 Clicks\" Daily Activity Inpatient Short Form   1. Putting on and taking off regular lower body clothing? 4 - None   2. Bathing (including washing, rinsing, drying)? 3 - A Little   3. Toileting, which includes using toilet, bedpan or urinal? 4 - None   4. Putting on and taking off regular upper body clothing? 4 - None   5. Taking care of personal grooming such as brushing teeth? 4 - None   6. Eating meals? 4 - None   Daily Activity Raw Score (Score out of 24.Lower scores equate to lower levels of function) 23   Total Evaluation Time   Total Evaluation Time (Minutes) 8     "

## 2018-01-05 NOTE — PLAN OF CARE
Problem: Patient Care Overview  Goal: Plan of Care/Patient Progress Review  Discharge Planner OT   Patient plan for discharge: home with assist  Current status: Patient is doing well from OT standpoint. Completed LE dressing with SBA, toilet transfer with SBA, ambulated in hallway to/from tub room using FWW with SBA, and tub transfer with CGA-SBA.   Barriers to return to prior living situation: stairs  Recommendations for discharge: home with assist from wife  Rationale for recommendations: Patient has met inpatient OT goals, will discharge from OT. Patient will have assist from wife as needed.        Entered by: MIRZA WILEY 01/05/2018 2:28 PM     Occupational Therapy Discharge Summary    Reason for therapy discharge:    All goals and outcomes met, no further needs identified.    Progress towards therapy goal(s). See goals on Care Plan in [facility] electronic health record for goal details.  Goals met    Therapy recommendation(s):    No further therapy is recommended.   Home with assist and use of DME (walker, shower chair).

## 2018-01-06 ENCOUNTER — APPOINTMENT (OUTPATIENT)
Dept: PHYSICAL THERAPY | Facility: CLINIC | Age: 63
End: 2018-01-06
Attending: ORTHOPAEDIC SURGERY
Payer: COMMERCIAL

## 2018-01-06 LAB — HGB BLD-MCNC: 11.6 G/DL (ref 13.3–17.7)

## 2018-01-06 PROCEDURE — 25000128 H RX IP 250 OP 636: Performed by: ORTHOPAEDIC SURGERY

## 2018-01-06 PROCEDURE — 85018 HEMOGLOBIN: CPT | Performed by: ORTHOPAEDIC SURGERY

## 2018-01-06 PROCEDURE — 25000132 ZZH RX MED GY IP 250 OP 250 PS 637: Performed by: INTERNAL MEDICINE

## 2018-01-06 PROCEDURE — 99231 SBSQ HOSP IP/OBS SF/LOW 25: CPT | Performed by: INTERNAL MEDICINE

## 2018-01-06 PROCEDURE — 25000132 ZZH RX MED GY IP 250 OP 250 PS 637: Performed by: ORTHOPAEDIC SURGERY

## 2018-01-06 PROCEDURE — 97110 THERAPEUTIC EXERCISES: CPT | Mod: GP | Performed by: REHABILITATION PRACTITIONER

## 2018-01-06 PROCEDURE — 36415 COLL VENOUS BLD VENIPUNCTURE: CPT | Performed by: ORTHOPAEDIC SURGERY

## 2018-01-06 PROCEDURE — 40000193 ZZH STATISTIC PT WARD VISIT: Performed by: REHABILITATION PRACTITIONER

## 2018-01-06 PROCEDURE — 12000001 ZZH R&B MED SURG/OB UMMC

## 2018-01-06 PROCEDURE — 97116 GAIT TRAINING THERAPY: CPT | Mod: GP | Performed by: REHABILITATION PRACTITIONER

## 2018-01-06 RX ADMIN — OXYCODONE HYDROCHLORIDE 10 MG: 5 TABLET ORAL at 18:39

## 2018-01-06 RX ADMIN — OXYCODONE HYDROCHLORIDE 10 MG: 5 TABLET ORAL at 15:36

## 2018-01-06 RX ADMIN — OXYCODONE HYDROCHLORIDE 10 MG: 5 TABLET ORAL at 04:18

## 2018-01-06 RX ADMIN — PRAVASTATIN SODIUM 10 MG: 10 TABLET ORAL at 07:44

## 2018-01-06 RX ADMIN — BISACODYL 10 MG: 10 SUPPOSITORY RECTAL at 13:22

## 2018-01-06 RX ADMIN — OXYCODONE HYDROCHLORIDE 10 MG: 5 TABLET ORAL at 07:38

## 2018-01-06 RX ADMIN — OXYCODONE HYDROCHLORIDE 10 MG: 5 TABLET ORAL at 21:45

## 2018-01-06 RX ADMIN — SENNOSIDES AND DOCUSATE SODIUM 2 TABLET: 8.6; 5 TABLET ORAL at 07:41

## 2018-01-06 RX ADMIN — SENNOSIDES AND DOCUSATE SODIUM 2 TABLET: 8.6; 5 TABLET ORAL at 21:45

## 2018-01-06 RX ADMIN — ASPIRIN 325 MG: 325 TABLET, FILM COATED ORAL at 07:44

## 2018-01-06 RX ADMIN — OXYCODONE HYDROCHLORIDE 10 MG: 5 TABLET ORAL at 10:29

## 2018-01-06 RX ADMIN — KETOROLAC TROMETHAMINE 30 MG: 30 INJECTION, SOLUTION INTRAMUSCULAR; INTRAVENOUS at 12:09

## 2018-01-06 RX ADMIN — OXYCODONE HYDROCHLORIDE 10 MG: 5 TABLET ORAL at 01:26

## 2018-01-06 NOTE — PLAN OF CARE
Problem: Patient Care Overview  Goal: Plan of Care/Patient Progress Review  Pt A/O X 4. VSS. CMS and Neuro's are intact. Denies numbness and tingling in all extremities. Denies nausea, shortness of breath, and chest pain. Given oxycodone for for pain q3hrs. Voids spontaneously without difficulty in the BR. Dressing is CDI. Pt up standby assist. Pt is able to make needs known and the call light is within the pt's reach. Continue to monitor.

## 2018-01-06 NOTE — PROGRESS NOTES
"Orthopedic Surgery Progress Note    Subjective:   NAEO.  Pain well controlled.  (-)cp/sob, (-)n/v, (+)tolerating PO.  Block wearing off.    Exam:  /72 (BP Location: Right arm)  Pulse 66  Temp 98.4  F (36.9  C) (Oral)  Resp 16  Ht 1.85 m (6' 0.83\")  Wt 101.1 kg (222 lb 14.2 oz)  SpO2 96%  BMI 29.54 kg/m2  Gen: Awake, alert, NAD  Resp: breathing equal and non-labored  Extremities:  RLE:   Bandages c/d/i   SILT dp/sp/t/s/s nerve distributions, diffuse partial numbness, improving (r/t block)   DP 2+   Fires EHL/FHL/TA/Gsc 5/5 strength   No calf tenderness      Labs:    Recent Labs  Lab 01/06/18  0557 01/05/18  0653 01/04/18  0556   HGB 11.6* 13.1* 14.8       Recent Labs  Lab 01/04/18  0556   *     No lab results found in last 7 days.      Assessment:   62 year old male s/p R primary TKA on 1/4/18 w Dr. Beatty.  Recovering appropriately.    Plan:  -Activity: WBAT   -PT/OT  -Drain: remain in place until drain output <25ml per shift  -Anticoagulation: ASA 3235 per day  -Antibiotics: clindamycin  -Pain control: po pain meds, iv for breakthrough  -Diet: ADAT  -Labs Needed: postop hgb    -Disposition:  Pending progress w PT  -Follow-up: 2 wks in clinic w Dr. Beatty.      Nelson Chaves MD  PGY-4, Orthopaedic Surgery  [phone]      "

## 2018-01-06 NOTE — PLAN OF CARE
Problem: PT General Care Plan  Goal: PT target date for goal attainment  PT:patient on track to d/c to home from a PT standpoint.     Discharge Planner PT   Patient plan for discharge: home  Current status: ambulated 200 feet using rolling walker with SBA and completed stair training with SBA. Patient modified independent with bed mobility and SBA with transfers. He completed exercises with AAROM 0-11-83 degrees.   Barriers to return to prior living situation: stairs   Recommendations for discharge: home with OP PT   Rationale for recommendations: Improved strength, endurancing post TKA and progress toward independence with functional mobility for safe return to community.        Entered by: Kim Luna 01/06/2018 11:54 AM

## 2018-01-06 NOTE — PLAN OF CARE
Problem: Knee Arthroplasty (Total, Partial) (Adult)  Goal: Signs and Symptoms of Listed Potential Problems Will be Absent, Minimized or Managed (Knee Arthroplasty)  Signs and symptoms of listed potential problems will be absent, minimized or managed by discharge/transition of care (reference Knee Arthroplasty (Total, Partial) (Adult) CPG).   Outcome: Improving    VS: Stable.   O2: On RA and stats upper 90's.   Output: Voiding adequate amount to the bathroom.   Last BM: LBM 1/4 and passing gas.   Activity: Up with SBA. WBAT.   Skin: Intact except right knee incision.   Pain: Pain well managed with prn oxycodone.    CMS: Has baseline numbness due neuropathy in BLEs.   Dressing: Right knee incisional dressing CDI. Ice applied.   Diet: On regular diet and tolerating well.   LDA: PIV SL into right hand.   Equipment: Walker, gait belt, PCD.   Plan: TBD will be d/c home when pass PT.   Additional Info:

## 2018-01-06 NOTE — PROGRESS NOTES
Focus: Patients status  DB: Patient using CPM to 85 degrees for flex.   I; Patient was encouraged to use I S, increase po intake and increase activity.   E: Patient seems to be resting quite comfortably between cares. Pt tolerating pain well with use of Oxycodone. Status of patient will continue to be monitored.

## 2018-01-07 ENCOUNTER — APPOINTMENT (OUTPATIENT)
Dept: PHYSICAL THERAPY | Facility: CLINIC | Age: 63
End: 2018-01-07
Attending: ORTHOPAEDIC SURGERY
Payer: COMMERCIAL

## 2018-01-07 VITALS
TEMPERATURE: 97.1 F | SYSTOLIC BLOOD PRESSURE: 137 MMHG | DIASTOLIC BLOOD PRESSURE: 69 MMHG | OXYGEN SATURATION: 94 % | HEIGHT: 73 IN | HEART RATE: 62 BPM | WEIGHT: 222.88 LBS | RESPIRATION RATE: 18 BRPM | BODY MASS INDEX: 29.54 KG/M2

## 2018-01-07 PROCEDURE — 25000132 ZZH RX MED GY IP 250 OP 250 PS 637: Performed by: INTERNAL MEDICINE

## 2018-01-07 PROCEDURE — 97530 THERAPEUTIC ACTIVITIES: CPT | Mod: GP

## 2018-01-07 PROCEDURE — 97116 GAIT TRAINING THERAPY: CPT | Mod: GP

## 2018-01-07 PROCEDURE — 25000132 ZZH RX MED GY IP 250 OP 250 PS 637: Performed by: ORTHOPAEDIC SURGERY

## 2018-01-07 PROCEDURE — 97110 THERAPEUTIC EXERCISES: CPT | Mod: GP

## 2018-01-07 PROCEDURE — 40000193 ZZH STATISTIC PT WARD VISIT

## 2018-01-07 PROCEDURE — 99231 SBSQ HOSP IP/OBS SF/LOW 25: CPT | Performed by: INTERNAL MEDICINE

## 2018-01-07 PROCEDURE — 25000128 H RX IP 250 OP 636: Performed by: ORTHOPAEDIC SURGERY

## 2018-01-07 RX ADMIN — KETOROLAC TROMETHAMINE 30 MG: 30 INJECTION, SOLUTION INTRAMUSCULAR; INTRAVENOUS at 07:08

## 2018-01-07 RX ADMIN — OXYCODONE HYDROCHLORIDE 10 MG: 5 TABLET ORAL at 03:58

## 2018-01-07 RX ADMIN — OXYCODONE HYDROCHLORIDE 10 MG: 5 TABLET ORAL at 08:07

## 2018-01-07 RX ADMIN — PRAVASTATIN SODIUM 10 MG: 10 TABLET ORAL at 08:02

## 2018-01-07 RX ADMIN — KETOROLAC TROMETHAMINE 30 MG: 30 INJECTION, SOLUTION INTRAMUSCULAR; INTRAVENOUS at 00:49

## 2018-01-07 RX ADMIN — ASPIRIN 325 MG: 325 TABLET, FILM COATED ORAL at 08:01

## 2018-01-07 RX ADMIN — OXYCODONE HYDROCHLORIDE 10 MG: 5 TABLET ORAL at 11:13

## 2018-01-07 RX ADMIN — SENNOSIDES AND DOCUSATE SODIUM 2 TABLET: 8.6; 5 TABLET ORAL at 08:01

## 2018-01-07 NOTE — PLAN OF CARE
Problem: Patient Care Overview  Goal: Plan of Care/Patient Progress Review  Pt A/O X 4. VSS. CMS and Neuro's are intact. Denies numbness and tingling in all extremities. Denies nausea, shortness of breath, and chest pain. Given oxycodone for for pain q3hrs. Voids spontaneously without difficulty in the BR. Dressing is CDI. CPM on and at 92 degrees flexion. Pt up standby assist. Pt is able to make needs known and the call light is within the pt's reach. Continue to monitor.

## 2018-01-07 NOTE — PROGRESS NOTES
"Orthopedic Surgery Progress Note    Subjective:   NAEO.  Pain well controlled.  (-)cp/sob, (-)n/v, (+)tolerating PO.    Exam:  /69 (BP Location: Right arm)  Pulse 62  Temp 97.1  F (36.2  C) (Oral)  Resp 18  Ht 1.85 m (6' 0.83\")  Wt 101.1 kg (222 lb 14.2 oz)  SpO2 94%  BMI 29.54 kg/m2  Gen: Awake, alert, NAD  Resp: breathing equal and non-labored  Extremities:  RLE:   Bandages c/d/i   SILT dp/sp/t/s/s nerve distributions   DP 2+   Fires EHL/FHL/TA/Gsc 5/5 strength   No calf tenderness      Labs:    Recent Labs  Lab 01/06/18  0557 01/05/18  0653 01/04/18  0556   HGB 11.6* 13.1* 14.8       Recent Labs  Lab 01/04/18  0556   *     No lab results found in last 7 days.      Assessment:   62 year old male s/p R primary TKA on 1/4/18 w Dr. Beatty.  Recovering appropriately.    Plan:  -Activity: WBAT   -PT/OT  -Drain: remain in place until drain output <25ml per shift  -Anticoagulation:  per day  -Antibiotics: clindamycin  -Pain control: po pain meds, iv for breakthrough  -Diet: ADAT  -Labs Needed: postop hgb    -Disposition:  To home today  -Follow-up: 2 wks in clinic w Dr. Beatty.      Nelson Chaves MD  PGY-4, Orthopaedic Surgery  [phone]      "

## 2018-01-07 NOTE — DISCHARGE SUMMARY
Orthopaedic Surgery Discharge Summary    Date of Admission: 1/4/2018  Date of Discharge: 01/07/18    Attending Physician: Dr. Beatty    Admission Diagnosis:  Right Knee Osteoarthritis  Hx of total knee arthroplasty    Discharge Diagnosis:  same    Procedures Performed:  R primary TKA    Brief History of Present Illness:  Pt had long-standing R knee DJD, resistant to conservative measures.  Risks/benefits of surgery were discussed, patient wished to proceed w TKA.    Brief Hospital Course:  The patient was admitted following the above mentioned procedure.  The patient did well following surgery without any immediate complications.  The patient was seen by physical therapy while in the hospital, received 24 hours of post-operative antibiotics and was placed on aspirin for DVT ppx.      On POD#3 the patient was cleared for discharge to home.  The patient was tolerating a regular diet, voiding, independently ambulatory and had pain control with oral pain medications.      Discharge medications, instructions and follow-up as below.     Kareem Bai   Home Medication Instructions ZANE:[id number]    Printed on:01/07/18 5728   Medication Information                      aspirin - buffered (ASCRIPTIN) 325 MG TABS tablet  Take 1 tablet (325 mg) by mouth daily             ibuprofen (ADVIL/MOTRIN) 800 MG tablet  Take 800 mg by mouth             lisinopril-hydrochlorothiazide (PRINZIDE/ZESTORETIC) 20-25 MG per tablet               oxyCODONE IR (ROXICODONE) 5 MG tablet  Take 1-2 tablets (5-10 mg) by mouth every 3 hours as needed for moderate to severe pain             pravastatin (PRAVACHOL) 10 MG tablet  TAKE ONE TABLET BY MOUTH AT BEDTIME                   Discharge Procedure Orders  Physical Therapy Referral   Referral Type: Rehab Therapy Physical Therapy     Reason for your hospital stay   Order Comments: You were admitted to the hospital following your total knee arthroplasty.     Adult [facility] Follow-up and recommended  labs and tests   Order Comments: You are to return to clinic in 2 weeks for wound check with the clinic nurse. Approximately 6 weeks after your surgery, you will be scheduled for an appointment with Dr. Beatty. At this time, AP and lateral knee imaging will be obtained.    If you need to schedule your 2 and 6 week postoperative visits or haven't received confirmation regarding these visits, please call one of the following numbers within 7 days of discharge.    For patients that see Dr. Beatty at:   -[facility]: [phone]  -[facility]: [phone]  -[facility]: [phone]     When to contact your care team   Order Comments: CALL YOUR PHYSICIAN IF:  -Pain in your hip persists or worsens in the first few days after surgery.  -Excessive redness or drainage of cloudy or bloody material from the wounds (Clear red tinted fluid and some mild drainage should be expected). Drainage of any kind 5 days after surgery should be reported to the doctor.  -You have a temperature elevation greater than 101.5    -You have pain, swelling or redness in your calf.  -You have numbness or weakness in your leg or foot.      You may contact your physician at [phone] during business hours.    For after hours or weekend calls, you may contact the hospital at [phone] and ask for the on-call orthopedic resident     Wound care and dressings   Order Comments: You have a clean Aquacel dressing on your surgical wound. This dressing should stay in place for 7 days to allow the incision to heal. After 7 days, you may change the dressing. Please use sterile 4x4 gauze dressings with tape over top to secure the dressing. If the dressing becomes wet or saturated with wound drainage, it is appropriate to change the dressing. If drainage persists from the incision site to the extent that it is frequently saturating the dressing, please contact your clinic nurse.     Discharge  Instructions   Order Comments: TOTAL KNEE ARTHROPLASTY POSTOPERATIVE INSTRUCTIONS    A.  COMFORT:  -Elevation: Elevate your knee and ankle above the level of your heart. The best position is lying down with two pillows lengthwise under your entire leg. Do not place pillows under your knee. This should be done for the first several days after surgery.  -Swelling: An ice pack will be provided to control swelling and discomfort. Place a thin towel between your skin and the ice pack and apply for 20 minutes.   -Pain Medication: Take medication as prescribed, but only as often as necessary.  Avoid alcohol and driving if you are taking pain medication. Remember that Tylenol can be used for pain relief as well, as you wean off the narcotics.  Maximum Tylenol dose for a single day is 4000 mg.            B.  ACTIVITIES:  -Exercises: Point and flex your feet and wiggle your toes, move your ankle around in a [place], to help prevent complications such as blood clotting in your legs. Quad muscle isometric and short arc exercises should begin the day of surgery and should be done for 10 to 15 minutes, 3 times a day, for the first few weeks after surgery. Patient to focus on full hyperextension to 90 degrees (or greater) of knee flexion.    -Weight bearing status: You may put weight on your operative leg (weight bearing as tolerated) but may be limited due to pain. Walk using assistive devices as needed.   -Physical therapy: A prescription for physical therapy will be administered  -Driving: Driving is NOT permitted until allowed by your provider.  -Athletic activities: Athletic activities, such as swimming, bicycling, jogging, running and stop-and-go-sports should be avoided until allowed by your doctor.  -Return to work: May returned to work when allowed by your provider.     C. INCISION CARE:    -Keep the initial post-op dressing on, as discussed above. You may shower 48 hours after surgery, however the dressing on your knee  should remain in place during showering. It is acceptable for water to run over the dressing, but you are not to soak or submerge your wound underwater. The steri-strips (small white tape that is directly on the incision areas) should be left on until they fall off or are removed at your first office visit.     Activity   Order Comments: Your activity upon discharge: WBAT.  Continue your physical therapy exercises at home.   Order Specific Question Answer Comments   Is discharge order? Yes      Full Code     Diet   Order Comments: You may return to your regular, pre-surgery diet unless instructed otherwise by your provider.   Order Specific Question Answer Comments   Is discharge order? Yes      Assign Questionnaire Series to Patient           Nelson Chaves MD  PGY-4, Orthopaedic Surgery  [phone]

## 2018-01-07 NOTE — PLAN OF CARE
Problem: Knee Arthroplasty (Total, Partial) (Adult)  Goal: Signs and Symptoms of Listed Potential Problems Will be Absent, Minimized or Managed (Knee Arthroplasty)  Signs and symptoms of listed potential problems will be absent, minimized or managed by discharge/transition of care (reference Knee Arthroplasty (Total, Partial) (Adult) CPG).   Outcome: Improving  VS: Stable.   O2: On RA and stats upper 90's.   Output: Voiding adequate amount to the bathroom.   Last BM: LBM 1/5 after suppository this morning and passing gas.   Activity: Up with SBA. WBAT.   Skin: Intact except right knee incision.   Pain: Pain well managed with prn oxycodone.    CMS: Has baseline numbness due neuropathy in BLEs.   Dressing: Right knee incisional dressing CDI. Ice applied. CPM up 0-92 degrees.   Diet: On regular diet and tolerating well.   LDA: PIV SL into right hand.   Equipment: Walker, gait belt, PCD.   Plan: Possible d/c tomorrow.    Additional Info:

## 2018-01-07 NOTE — PROGRESS NOTES
"No new issues  Pain is managed  No cp or sob   No fever or chills  No nausea or vomiting  Vital signs:  Temp: 97.1  F (36.2  C) Temp src: Oral BP: 117/54 Pulse: 69 Heart Rate: 72 Resp: 18 SpO2: 98 % O2 Device: None (Room air) Oxygen Delivery: 2 LPM Height: 185 cm (6' 0.83\") Weight: 101.1 kg (222 lb 14.2 oz)  Estimated body mass index is 29.54 kg/(m^2) as calculated from the following:    Height as of this encounter: 1.85 m (6' 0.83\").    Weight as of this encounter: 101.1 kg (222 lb 14.2 oz).  Perr, eomi  Neck;supple  Chest ; clear b/l  cvs ; RRR  Gi ; soft non tender, bs positive  Ext ; knee dressed   Labs; 11.6  A/P  Kareem Bai is a 62 year old male admitted on 1/4/2018 after knee surgery      # *Right TKA  .   . Preop Hb 14.8  Analgesia: per ortho   DVT prophylaxis:- per ortho   Abx and wound care as per primary team.   Intensive spirometry to prevent atelectasis       #HTN ; at home on lisinopril 20/ HCTZ 25 mg . Can resume on dc       #HLD;continue Simvastatin   #Dispo ; per ortho , ok to dc today from medicine perspective                                  "

## 2018-01-07 NOTE — PROGRESS NOTES
Focus: Patients discharge to home  DB: patient to be discharged to home per Drs orders.   I: Talked with patient about med's, diet, follow up appointments, numbers to call for questions or concerns and hygiene cares.   E: Patients seemed to have a full understanding of plan of cares for discharge. Patient had scripts filled here at the hospital prior to discharge. Patient had all belongings returned to him prior to discharge. Patient was escorted to front door with all belongings filled scripts in wheelchair to be discharged to home with wife.

## 2018-01-07 NOTE — PLAN OF CARE
Problem: PT General Care Plan  Goal: PT target date for goal attainment  PT: patient met 4 out of 6 goals with 2 out of 6 goals partially met with ambulating 100 feet with modified independent and AAROM 0-5-93 degrees.     Discharge Planner PT   Patient plan for discharge: Home  Current status: patient ambulated 100 feet x 2 using rolling walker with modified independence and modified independent with transfers and bed mobility. Patient completed supine exercises and stair training with SBA.   Barriers to return to prior living situation: none anticipated  Recommendations for discharge: home with OP PT  Rationale for recommendations: improve strength, endurance post TKA surgery to progress independence with functional mobility for safe return to community.        Entered by: Kim Luna 01/07/2018 4:09 PM

## 2018-01-10 ENCOUNTER — THERAPY VISIT (OUTPATIENT)
Dept: PHYSICAL THERAPY | Facility: CLINIC | Age: 63
End: 2018-01-10
Payer: COMMERCIAL

## 2018-01-10 DIAGNOSIS — Z96.651 AFTERCARE FOLLOWING RIGHT KNEE JOINT REPLACEMENT SURGERY: Primary | ICD-10-CM

## 2018-01-10 DIAGNOSIS — Z96.651 HISTORY OF TOTAL RIGHT KNEE REPLACEMENT: ICD-10-CM

## 2018-01-10 DIAGNOSIS — Z47.1 AFTERCARE FOLLOWING RIGHT KNEE JOINT REPLACEMENT SURGERY: Primary | ICD-10-CM

## 2018-01-10 PROCEDURE — 97110 THERAPEUTIC EXERCISES: CPT | Mod: GP | Performed by: PHYSICAL THERAPIST

## 2018-01-10 PROCEDURE — 97161 PT EVAL LOW COMPLEX 20 MIN: CPT | Mod: GP | Performed by: PHYSICAL THERAPIST

## 2018-01-10 RX ORDER — OXYCODONE HYDROCHLORIDE 5 MG/1
5-10 TABLET ORAL
Qty: 60 TABLET | Refills: 0 | Status: SHIPPED | OUTPATIENT
Start: 2018-01-10 | End: 2018-01-18

## 2018-01-10 ASSESSMENT — ACTIVITIES OF DAILY LIVING (ADL)
GIVING WAY, BUCKLING OR SHIFTING OF KNEE: I DO NOT HAVE THE SYMPTOM
SWELLING: THE SYMPTOM AFFECTS MY ACTIVITY MODERATELY
STIFFNESS: THE SYMPTOM AFFECTS MY ACTIVITY MODERATELY
KNEEL ON THE FRONT OF YOUR KNEE: I AM UNABLE TO DO THE ACTIVITY
SIT WITH YOUR KNEE BENT: I AM UNABLE TO DO THE ACTIVITY
WALK: ACTIVITY IS MINIMALLY DIFFICULT
PAIN: THE SYMPTOM AFFECTS MY ACTIVITY MODERATELY
STAND: ACTIVITY IS NOT DIFFICULT
RISE FROM A CHAIR: ACTIVITY IS FAIRLY DIFFICULT
LIMPING: THE SYMPTOM AFFECTS MY ACTIVITY SLIGHTLY
RAW_SCORE: 37
GO UP STAIRS: ACTIVITY IS MINIMALLY DIFFICULT
KNEE_ACTIVITY_OF_DAILY_LIVING_SUM: 37
SQUAT: I AM UNABLE TO DO THE ACTIVITY
GO DOWN STAIRS: ACTIVITY IS MINIMALLY DIFFICULT
WEAKNESS: I HAVE THE SYMPTOM BUT IT DOES NOT AFFECT MY ACTIVITY
KNEE_ACTIVITY_OF_DAILY_LIVING_SCORE: 52.86

## 2018-01-10 NOTE — MR AVS SNAPSHOT
After Visit Summary   1/10/2018    Kareem Bai    MRN: [id number]           Patient Information     Date Of Birth          1955        Visit Information        Provider Department      1/10/2018 9:00 AM Isabelle Masters, PT [facility] Physical Therapy        Today's Diagnoses     Aftercare following right knee joint replacement surgery    -  1       Follow-ups after your visit        Your next 10 appointments already scheduled     Jan 12, 2018 12:50 PM CST   KAELYN Extremity with Cecilia Maria PTA   [facility] Physical Therapy ([facility])    [address] [place] [address] [address] [place] [id number]   [phone]            Garcia 15, 2018  9:40 AM CST   KAELYN Extremity with Cecilia Maria PTA   [facility] Physical Therapy ([facility])    [address] [place] [address] [address] [place] [address]   [phone]            Jan 17, 2018  9:40 AM CST   KAELYN Extremity with Cecilia Maria PTA   [facility] Physical Therapy ([facility])    [address] [place] [phone]   [phone]            Jan 18, 2018  8:45 AM CST   (Arrive by 8:30 AM)   Return Visit with REE Laura [facility] ([facility])    [address] [place] [address]   [phone]            Jan 19, 2018  9:00 AM CST   KAELYN Extremity with Cecilia Maria PTA   [facility] ([facility])    [address] [place] [phone]   [phone]            Jan 22, 2018  9:00 AM CST   KAELYN Extremity with Cecilia Maria PTA   [facility] ([facility])    [address] [place] [address]  [address]  [facility] [address]   [phone]            Jan 24, 2018  9:00 AM CST   KAELYN Extremity with Cecilia Maria, [facility] ([facility])    [address] [place] [address] [address]  [facility] [address]   [phone]            Jan 29, 2018  9:00 AM CST   KAELYN Extremity with Cecilia Maria [facility] Physical Therapy ([facility])    [address] [place] [address] [address]  [facility] [address]   [phone]            Jan 31, 2018  9:00 AM CST   KAELYN Extremity with Cecilia Maria, [facility] ([facility])    [address] [place] [address] [address]  [facility] [address]   [phone]            Feb 02, 2018  9:00 AM CST   KAELYN Extremity with Cecilia Maria [facility] ([facility])    [address] [place] [address]   [phone]              Who to contact     If you have questions or need follow up information about today's clinic visit or your schedule please contact [facility] directly at [phone].  Normal or non-critical lab and imaging results will be communicated to you by [employer], letter or phone within 4 business days after the clinic has received the results. If you do not hear from us within 7 days, please contact the clinic through [employer] or phone. If you have a critical or abnormal lab result, we will notify you by phone as soon as possible.  Submit refill requests through [employer]! or call your pharmacy and they will forward the refill request to us. Please allow 3 business days for your refill to be completed.          Additional Information About Your Visit        [employer]! Information     [employer]! lets you send messages to your doctor, view your  "test results, renew your prescriptions, schedule appointments and more. To sign up, go to [email or web address] . Click on \"Log in\" on the left side of the screen, which will take you to the Welcome page. Then click on \"Sign up Now\" on the right side of the page.     You will be asked to enter the access code listed below, as well as some personal information. Please follow the directions to create your username and password.     Your access code is: [email or web address]  Expires: 2018  6:30 AM     Your access code will  in 90 days. If you need help or a new code, please call your [place] clinic or [phone].        Care EveryWhere ID     This is your Care EveryWhere ID. This could be used by other organizations to access your [place] medical records  [id number]         Blood Pressure from Last 3 Encounters:   18 137/69    Weight from Last 3 Encounters:   18 101.1 kg (222 lb 14.2 oz)   17 105.2 kg (232 lb)              We Performed the Following     HC PT EVAL, LOW COMPLEXITY     KAELYN INITIAL EVAL REPORT     THERAPEUTIC EXERCISES          Where to get your medicines      Some of these will need a paper prescription and others can be bought over the counter.  Ask your nurse if you have questions.     Bring a paper prescription for each of these medications     oxyCODONE IR 5 MG tablet          Primary Care Provider Office Phone # Fax #    [facility] [phone] [address], [address]  [facility] [address]        Equal Access to Services     [facility]: Hadii aad ku hadasho Soomaali, waaxda luqadaha, qaybta kaalmada adeeghugh, jasbir artis . So [facility] [phone].    ATENCIÓN: Si habla español, tiene a guaman disposición servicios gratuitos de asistencia lingüística. Llame al [phone].    We comply with applicable federal civil rights laws and [place] laws. We do not discriminate on the basis of race, color, national " origin, age, disability, sex, sexual orientation, or gender identity.            Thank you!     Thank you for choosing [facility]  for your care. Our goal is always to provide you with excellent care. Hearing back from our patients is one way we can continue to improve our services. Please take a few minutes to complete the written survey that you may receive in the mail after your visit with us. Thank you!             Your Updated Medication List - Protect others around you: Learn how to safely use, store and throw away your medicines at www.disposemymeds.org.          This list is accurate as of: 1/10/18 11:39 AM.  Always use your most recent med list.                   Brand Name Dispense Instructions for use Diagnosis    aspirin - buffered 325 MG Tabs tablet    ASCRIPTIN    28 each    Take 1 tablet (325 mg) by mouth daily    History of total right knee replacement       ibuprofen 800 MG tablet    ADVIL/MOTRIN     Take 800 mg by mouth        lisinopril-hydrochlorothiazide 20-25 MG per tablet    PRINZIDE/ZESTORETIC          oxyCODONE IR 5 MG tablet    ROXICODONE    60 tablet    Take 1-2 tablets (5-10 mg) by mouth every 3 hours as needed for moderate to severe pain    History of total right knee replacement       pravastatin 10 MG tablet    PRAVACHOL     TAKE ONE TABLET BY MOUTH AT BEDTIME

## 2018-01-10 NOTE — LETTER
[facility]THLETIC [facility] PHYSICAL THERAPY  [address]  [facility] [address]  [phone]    2018    Re: Kareem Bai   :   1955  MRN:  [id number]   REFERRING PHYSICIAN:   Marcello Beatty    [facility] PHYSICAL THERAPY    Date of Initial Evaluation:  01/10/2018  Visits:  Rxs Used: 1  Reason for Referral:  Aftercare following right knee joint replacement surgery    EVALUATION SUMMARY    Subjective:  Patient is a 62 year old male presenting with rehab right knee hpi. The history is provided by the patient.   Kareem Bai is a 62 year old male with a right knee condition.  Condition occurred with:  Degenerative joint disease and repetition/overuse.  Condition occurred: at work and in the community.  This is a new condition  R TKA surgery on 2018. .    Patient reports pain:  In the joint, anterior, posterior, medial and lateral.    Pain is described as aching and is constant and reported as 5/10 (PL's range from 4-7/10).  Associated symptoms:  Tingling, loss of strength, edema, numbness and loss of motion/stiffness. Pain is the same all the time.  Symptoms are exacerbated by walking, activity, weight bearing, standing, sitting, bending/squatting, descending stairs and ascending stairs and relieved by analgesics, ice and rest.  Since onset symptoms are unchanged.  Special tests:  X-ray.  Previous treatment includes physical therapy (PT in hospital following TKA. ).  There was mild improvement following previous treatment.  General health as reported by patient is fair.  Pertinent medical history includes:  High blood pressure, seizures and osteoarthritis.  Medical allergies: yes.  Other surgeries include:  Orthopedic surgery (spinal fusion 2011 L4-5. Had numbness into feet with wakling- improved since surgery. No current back pain.  ).  Current medications:  High blood pressure medication and pain  medication (oxycodone s/p TKA).  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 .  Patient is currently not working due to present treatment problem (Patient plans on retiring and not returning to work).    Barriers include:  Stairs, bathroom/bedroom on second floor and transportation (2-3 to get into home. 13 upstairs, 13 to go downstairs. 1 railing with each set of stairs. Wife drives patient ).                  Objective:  Gait:    Gait Type:  Antalgic   Assistive Devices:  Crutches  Deviations:  Knee:  Knee extension decr R and knee flexion decr RAnkle:  Push off decr R and heel strike decr R  Flexibility/Screens:     Re: Kareem Bai   :   1955    Lower Extremity:  Decreased right lower extremity flexibility:  Quadriceps; Hamstrings and Gastroc       Knee Evaluation:  ROM:    AROM  Hyperextension: Left:     Right: 0  Extension: Left:    Right:  -12  Flexion: Left:   Right: 90  PROM  Extension: Left:   Right:  -12 degrees from neutra  Flexion: Left:   Right:  112  Pain: pain limiting knee flexion and extension  Endfeel: empty  Strength:   Quad Set Left:  WNL    Pain: -   Quad Set Right:  Fair    Pain: +  Edema:    Circumference:  Joint Line:  Left:  41 cm   Right:  48 cm, measured with bandage on  Mobility Testing:    Patellofemoral Medial:  Right: hypomobile  Patellofemoral Lateral:  Right: hypomobile  Patellofemoral Superior:  Right: hypomobile  Patellofemoral Inferior:  Right: hypomobile  Functional Testing:    Proprioception:   Stork Balance Test: Left:   Right:  Unable  % of Uninvolved:     Assessment/Plan:    Patient is a 62 year old male with right side knee complaints.    Patient has the following significant findings with corresponding treatment plan.                Diagnosis 1:  S/p R TKA  Pain -  hot/cold therapy, manual therapy, self management, education and home program  Decreased ROM/flexibility - manual therapy, therapeutic exercise, therapeutic activity and home  program  Decreased joint mobility - manual therapy, therapeutic exercise and home program  Decreased strength - therapeutic exercise, therapeutic activities and home program  Decreased proprioception - neuro re-education, gait training and therapeutic activities  Edema - cold therapy and self management/home program  Impaired gait - gait training, assistive devices and home program  Decreased function - therapeutic activities and home program    Therapy Evaluation Codes:   1) History comprised of:   Personal factors that impact the plan of care:      None.    Comorbidity factors that impact the plan of care are:      None.     Medications impacting care: None.  Re: Kareem Bai   :   1955    2) Examination of Body Systems comprised of:   Body structures and functions that impact the plan of care:      Knee.   Activity limitations that impact the plan of care are:      Bathing, Bending, Driving, Dressing, Jumping, Lifting, Running, Squatting/kneeling, Stairs, Standing and Walking.  3) Clinical presentation characteristics are:   Stable/Uncomplicated.  4) Decision-Making    Low complexity using standardized patient assessment instrument and/or measureable assessment of functional outcome.  Cumulative Therapy Evaluation is: Low complexity.    Previous and current functional limitations:  (See Goal Flow Sheet for this information)    Short term and Long term goals: (See Goal Flow Sheet for this information)     Communication ability:  Patient appears to be able to clearly communicate and understand verbal and written communication and follow directions correctly.  Treatment Explanation - The following has been discussed with the patient:   RX ordered/plan of care  Anticipated outcomes  Possible risks and side effects  This patient would benefit from PT intervention to resume normal activities.   Rehab potential is excellent.    Frequency:  2 X week, once daily  Duration:  for 6 weeks  Discharge Plan:   Achieve all LTG.  Independent in home treatment program.  Reach maximal therapeutic benefit.    Thank you for your referral.    INQUIRIES  Therapist: Isabelle Stephens   [facility]  [address] [address]  Phone: [phone]  Fax: [phone]

## 2018-01-10 NOTE — TELEPHONE ENCOUNTER
Pt calls for refill of pain medication. Rx refilled per standing order. Wife, Sara, will p/u signed Rx from lock box.

## 2018-01-10 NOTE — PROGRESS NOTES
[facility] Initial Evaluation  Subjective:  Patient is a 62 year old male presenting with rehab right knee hpi. The history is provided by the patient.   Kareem Bai is a 62 year old male with a right knee condition.  Condition occurred with:  Degenerative joint disease and repetition/overuse.  Condition occurred: at work and in the community.  This is a new condition  R TKA surgery on 1/4/2018. .    Patient reports pain:  In the joint, anterior, posterior, medial and lateral.    Pain is described as aching and is constant and reported as 5/10 (PL's range from 4-7/10).  Associated symptoms:  Tingling, loss of strength, edema, numbness and loss of motion/stiffness. Pain is the same all the time.  Symptoms are exacerbated by walking, activity, weight bearing, standing, sitting, bending/squatting, descending stairs and ascending stairs and relieved by analgesics, ice and rest.  Since onset symptoms are unchanged.  Special tests:  X-ray.  Previous treatment includes physical therapy (PT in hospital following TKA. ).  There was mild improvement following previous treatment.  General health as reported by patient is fair.  Pertinent medical history includes:  High blood pressure, seizures and osteoarthritis.  Medical allergies: yes.  Other surgeries include:  Orthopedic surgery (spinal fusion 2011 L4-5. Had numbness into feet with wakling- improved since surgery. No current back pain.  ).  Current medications:  High blood pressure medication and pain medication (oxycodone s/p TKA).  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 .  Patient is currently not working due to present treatment problem (Patient plans on retiring and not returning to work).      Barriers include:  Stairs, bathroom/bedroom on second floor and transportation (2-3 to get into home. 13 upstairs, 13 to go downstairs. 1 railing with each set of stairs. Wife drives patient.  ).                             Objective:    Gait:    Gait Type:  Antalgic   Assistive Devices:  Crutches  Deviations:  Knee:  Knee extension decr R and knee flexion decr RAnkle:  Push off decr R and heel strike decr R    Flexibility/Screens:       Lower Extremity:      Decreased right lower extremity flexibility:  Quadriceps; Hamstrings and Gastroc                                                      Knee Evaluation:  ROM:    AROM    Hyperextension: Left:     Right: 0  Extension: Left:    Right:  -12  Flexion: Left:   Right: 90  PROM      Extension: Left:   Right:  -12 degrees from neutra  Flexion: Left:   Right:  112  Pain: pain limiting knee flexion and extension  Endfeel: empty  Strength:         Quad Set Left:  WNL    Pain: -   Quad Set Right:  Fair    Pain: +        Edema:    Circumference:      Joint Line:  Left:  41 cm   Right:  48 cm, measured with bandage on    Mobility Testing:      Patellofemoral Medial:  Right: hypomobile  Patellofemoral Lateral:  Right: hypomobile  Patellofemoral Superior:  Right: hypomobile  Patellofemoral Inferior:  Right: hypomobile  Functional Testing:            Proprioception:   Stork Balance Test: Left:   Right:  Unable  % of Uninvolved:           General     ROS    Assessment/Plan:    Patient is a 62 year old male with right side knee complaints.    Patient has the following significant findings with corresponding treatment plan.                Diagnosis 1:  S/p R TKA  Pain -  hot/cold therapy, manual therapy, self management, education and home program  Decreased ROM/flexibility - manual therapy, therapeutic exercise, therapeutic activity and home program  Decreased joint mobility - manual therapy, therapeutic exercise and home program  Decreased strength - therapeutic exercise, therapeutic activities and home program  Decreased proprioception - neuro re-education, gait training and therapeutic activities  Edema - cold therapy and self management/home program  Impaired gait - gait training, assistive  devices and home program  Decreased function - therapeutic activities and home program    Therapy Evaluation Codes:   1) History comprised of:   Personal factors that impact the plan of care:      None.    Comorbidity factors that impact the plan of care are:      None.     Medications impacting care: None.  2) Examination of Body Systems comprised of:   Body structures and functions that impact the plan of care:      Knee.   Activity limitations that impact the plan of care are:      Bathing, Bending, Driving, Dressing, Jumping, Lifting, Running, Squatting/kneeling, Stairs, Standing and Walking.  3) Clinical presentation characteristics are:   Stable/Uncomplicated.  4) Decision-Making    Low complexity using standardized patient assessment instrument and/or measureable assessment of functional outcome.  Cumulative Therapy Evaluation is: Low complexity.    Previous and current functional limitations:  (See Goal Flow Sheet for this information)    Short term and Long term goals: (See Goal Flow Sheet for this information)     Communication ability:  Patient appears to be able to clearly communicate and understand verbal and written communication and follow directions correctly.  Treatment Explanation - The following has been discussed with the patient:   RX ordered/plan of care  Anticipated outcomes  Possible risks and side effects  This patient would benefit from PT intervention to resume normal activities.   Rehab potential is excellent.    Frequency:  2 X week, once daily  Duration:  for 6 weeks  Discharge Plan:  Achieve all LTG.  Independent in home treatment program.  Reach maximal therapeutic benefit.    Please refer to the daily flowsheet for treatment today, total treatment time and time spent performing 1:1 timed codes.

## 2018-01-11 DIAGNOSIS — Z96.651 AFTERCARE FOLLOWING RIGHT KNEE JOINT REPLACEMENT SURGERY: Primary | ICD-10-CM

## 2018-01-11 DIAGNOSIS — Z47.1 AFTERCARE FOLLOWING RIGHT KNEE JOINT REPLACEMENT SURGERY: Primary | ICD-10-CM

## 2018-01-12 ENCOUNTER — THERAPY VISIT (OUTPATIENT)
Dept: PHYSICAL THERAPY | Facility: CLINIC | Age: 63
End: 2018-01-12
Payer: COMMERCIAL

## 2018-01-12 DIAGNOSIS — Z96.651 AFTERCARE FOLLOWING RIGHT KNEE JOINT REPLACEMENT SURGERY: ICD-10-CM

## 2018-01-12 DIAGNOSIS — Z47.1 AFTERCARE FOLLOWING RIGHT KNEE JOINT REPLACEMENT SURGERY: ICD-10-CM

## 2018-01-12 PROCEDURE — 97110 THERAPEUTIC EXERCISES: CPT | Mod: GP | Performed by: PHYSICAL THERAPY ASSISTANT

## 2018-01-12 PROCEDURE — 97112 NEUROMUSCULAR REEDUCATION: CPT | Mod: GP | Performed by: PHYSICAL THERAPY ASSISTANT

## 2018-01-15 ENCOUNTER — THERAPY VISIT (OUTPATIENT)
Dept: PHYSICAL THERAPY | Facility: CLINIC | Age: 63
End: 2018-01-15
Payer: COMMERCIAL

## 2018-01-15 DIAGNOSIS — G89.18 POSTOPERATIVE PAIN: Primary | ICD-10-CM

## 2018-01-15 DIAGNOSIS — Z96.651 AFTERCARE FOLLOWING RIGHT KNEE JOINT REPLACEMENT SURGERY: ICD-10-CM

## 2018-01-15 DIAGNOSIS — Z47.1 AFTERCARE FOLLOWING RIGHT KNEE JOINT REPLACEMENT SURGERY: ICD-10-CM

## 2018-01-15 PROCEDURE — 97110 THERAPEUTIC EXERCISES: CPT | Mod: GP | Performed by: PHYSICAL THERAPY ASSISTANT

## 2018-01-15 PROCEDURE — 97112 NEUROMUSCULAR REEDUCATION: CPT | Mod: GP | Performed by: PHYSICAL THERAPY ASSISTANT

## 2018-01-15 RX ORDER — HYDROCODONE BITARTRATE AND ACETAMINOPHEN 5; 325 MG/1; MG/1
1-2 TABLET ORAL EVERY 4 HOURS PRN
Qty: 60 TABLET | Refills: 0 | Status: SHIPPED | OUTPATIENT
Start: 2018-01-15 | End: 2018-01-24

## 2018-01-15 NOTE — TELEPHONE ENCOUNTER
Juan Manuel underwent Right total knee replacement on 1/4/18.  He's calling about medication, stating his second prescription for oxycodone filled 1/10/18 does not seem to be working.  He states the tablets are very small and he has not been getting sleep due to pain, taking two tabs every three hours.  He is willing to try Norco and the Rx was taken to our pharmacy at the [facility], his wife to  tomorrow.

## 2018-01-17 ENCOUNTER — THERAPY VISIT (OUTPATIENT)
Dept: PHYSICAL THERAPY | Facility: CLINIC | Age: 63
End: 2018-01-17
Payer: COMMERCIAL

## 2018-01-17 DIAGNOSIS — Z47.1 AFTERCARE FOLLOWING RIGHT KNEE JOINT REPLACEMENT SURGERY: ICD-10-CM

## 2018-01-17 DIAGNOSIS — Z96.651 AFTERCARE FOLLOWING RIGHT KNEE JOINT REPLACEMENT SURGERY: ICD-10-CM

## 2018-01-17 PROCEDURE — 97112 NEUROMUSCULAR REEDUCATION: CPT | Mod: GP | Performed by: PHYSICAL THERAPY ASSISTANT

## 2018-01-17 PROCEDURE — 97110 THERAPEUTIC EXERCISES: CPT | Mod: GP | Performed by: PHYSICAL THERAPY ASSISTANT

## 2018-01-18 ENCOUNTER — OFFICE VISIT (OUTPATIENT)
Dept: ORTHOPEDICS | Facility: CLINIC | Age: 63
End: 2018-01-18
Payer: COMMERCIAL

## 2018-01-18 ENCOUNTER — RADIANT APPOINTMENT (OUTPATIENT)
Dept: GENERAL RADIOLOGY | Facility: CLINIC | Age: 63
End: 2018-01-18
Attending: NURSE PRACTITIONER
Payer: COMMERCIAL

## 2018-01-18 VITALS — HEIGHT: 73 IN | WEIGHT: 218 LBS | RESPIRATION RATE: 16 BRPM | BODY MASS INDEX: 28.89 KG/M2

## 2018-01-18 DIAGNOSIS — Z96.651 STATUS POST TOTAL RIGHT KNEE REPLACEMENT: Primary | ICD-10-CM

## 2018-01-18 DIAGNOSIS — Z96.651 AFTERCARE FOLLOWING RIGHT KNEE JOINT REPLACEMENT SURGERY: ICD-10-CM

## 2018-01-18 DIAGNOSIS — Z47.1 AFTERCARE FOLLOWING RIGHT KNEE JOINT REPLACEMENT SURGERY: ICD-10-CM

## 2018-01-18 NOTE — PROGRESS NOTES
"Kareem is seen 2 weeks post op of his right total knee replacement done on 1/4/2018. He reports doing well without complaints. Doing therapy on his own and formal 2x a week.  Icing daily. Taking norco now \"doesn't work as well but tolerable\".  Taking an aspirin daily. No nausea or SOB. Incision is healed/intact. Sutures removed from navigation site. No calf pain. ROM -4-106. Minimal swelling today. + CMS    Xrays taken: 2v right knee show good position of femoral and tibial components without lucency. Alignment near neutral.    Dx: R TKA    Plan:  1. Continue aspirin  2. Add naproxen as needed for inflammatory pain/swelling  3. Follow up in 1 month for full length and right knee lateral.    "

## 2018-01-18 NOTE — LETTER
"1/18/2018       RE: Kareem Bai  [address]  [facility] [address]     Dear Colleague,    Thank you for referring your patient, Kareem Bai, to the [facility] at [facility]. Please see a copy of my visit note below.    Kareem is seen 2 weeks post op of his right total knee replacement done on 1/4/2018. He reports doing well without complaints. Doing therapy on his own and formal 2x a week.  Icing daily. Taking norco now \"doesn't work as well but tolerable\".  Taking an aspirin daily. No nausea or SOB. Incision is healed/intact. Sutures removed from navigation site. No calf pain. ROM -4-106. Minimal swelling today. + CMS    Xrays taken: 2v right knee show good position of femoral and tibial components without lucency. Alignment near neutral.    Dx: R TKA    Plan:  1. Continue aspirin  2. Add naproxen as needed for inflammatory pain/swelling  3. Follow up in 1 month for full length and right knee lateral.      Again, thank you for allowing me to participate in the care of your patient.      Sincerely,    Magda Rowan, APRN CNP      "

## 2018-01-18 NOTE — MR AVS SNAPSHOT
After Visit Summary   1/18/2018    Kareem Bai    MRN: [id number]           Patient Information     Date Of Birth          1955        Visit Information        Provider Department      1/18/2018 8:45 AM Magda Rowan, APRN CNP [facility]        Today's Diagnoses     Status post total right knee replacement    -  1       Follow-ups after your visit        Your next 10 appointments already scheduled     Jan 19, 2018  9:00 AM CST   KAELYN Extremity with Cecilia [facility] Physical Therapy ([facility])    [address] [place] [address]   [phone]            Jan 22, 2018  9:00 AM CST   KAELYN [facility]rem[facility]    [address] [place] [address]   [phone]            Jan 24, 2018  9:00 AM CST   KAELYN Extremity with Cecilia [facility] Physical Therapy ([facility])    [address] [place] [address]   [phone]            Jan 29, 2018  9:00 AM CST   KAELYN Extremity with Cecilia Maria [facility] Physical Therapy ([facility])    [address] [place] [address]   [phone]            Jan 31, 2018  9:00 AM CST   KAELYN Extremity with Cecilia [facility] Physical Therapy ([facility])    [address] [place] [address]   [phone]            Feb 02, 2018  9:00 AM CST   KAELYN Extremity with Cecilia [facility] Physical Therapy ([facility])    [address] [place] [address]   [phone]            Feb  "2018 10:45 AM CST   (Arrive by 10:30 AM)   Return Visit with Marcello Beatty MD   [facility] ([facility])    [address] [address] [phone]              Who to contact     Please call your clinic at [phone] to:    Ask questions about your health    Make or cancel appointments    Discuss your medicines    Learn about your test results    Speak to your doctor   If you have compliments or concerns about an experience at your clinic, or if you wish to file a complaint, please contact [facility] Patient Relations at [phone] or email us at [email or web address]         Additional Information About Your Visit        Advanced Personalized Diagnosticshart Information     Server D[employer] is an electronic gateway that provides easy, online access to your medical records. With Server D[employer], you can request a clinic appointment, read your test results, renew a prescription or communicate with your care team.     To sign up for Server D[employer] visit the website at [email or web address]   You will be asked to enter the access code listed below, as well as some personal information. Please follow the directions to create your username and password.     Your access code is: [email or web address]  Expires: 2018  6:30 AM     Your access code will  in 90 days. If you need help or a new code, please contact your [facility] Clinic or call [phone] for assistance.        Care EveryWhere ID     This is your Care EveryWhere ID. This could be used by other organizations to access your [place] medical records  [id number]        Your Vitals Were     Respirations Height BMI (Body Mass Index)             16 1.854 m (6' 1\") 28.76 kg/m2          Blood Pressure from Last 3 Encounters:   18 137/69    Weight from Last 3 Encounters:   18 98.9 kg (218 lb)   18 101.1 kg (222 lb 14.2 oz)   17 105.2 kg (232 " lb)              Today, you had the following     No orders found for display       Primary Care Provider Office Phone # Fax #    [facility] [phone] [address]  [facility] [address]        Equal Access to Services     WILLIAMSTEPHEN ALBERT : Hadii aad ku hadporscheo Soomaali, waaxda luqadaha, qaybta kaalmada adeegyada, waxay idiin haytan adeeg cici laezekielbob meyer. So [facility] [phone].    ATENCIÓN: Si habla español, tiene a guaman disposición servicios gratuitos de asistencia lingüística. Llame al [phone].    We comply with applicable federal civil rights laws and [place] laws. We do not discriminate on the basis of race, color, national origin, age, disability, sex, sexual orientation, or gender identity.            Thank you!     Thank you for choosing [facility]  for your care. Our goal is always to provide you with excellent care. Hearing back from our patients is one way we can continue to improve our services. Please take a few minutes to complete the written survey that you may receive in the mail after your visit with us. Thank you!             Your Updated Medication List - Protect others around you: Learn how to safely use, store and throw away your medicines at www.disposemymeds.org.          This list is accurate as of: 1/18/18  9:08 AM.  Always use your most recent med list.                   Brand Name Dispense Instructions for use Diagnosis    aspirin - buffered 325 MG Tabs tablet    ASCRIPTIN    28 each    Take 1 tablet (325 mg) by mouth daily    History of total right knee replacement       HYDROcodone-acetaminophen 5-325 MG per tablet    NORCO    60 tablet    Take 1-2 tablets by mouth every 4 hours as needed for moderate to severe pain (max 10/day)    Postoperative pain       ibuprofen 800 MG tablet    ADVIL/MOTRIN     Take 800 mg by mouth        lisinopril-hydrochlorothiazide 20-25 MG per tablet    PRINZIDE/ZESTORETIC          pravastatin  10 MG tablet    PRAVACHOL     TAKE ONE TABLET BY MOUTH AT BEDTIME

## 2018-01-18 NOTE — NURSING NOTE
Reason For Visit:   Chief Complaint   Patient presents with     Surgical Followup     Right TKA DOS 01/04/18       ?  No   Occupation [employer] .  Currently working? Yes.  Work status?  On medical leave.  Date of injury: N/A    Date of surgery: 01/04/2018  Type of surgery: Right Total Knee Arthroplasty.  Smoker: No      Pain Assessment  Patient Currently in Pain: No

## 2018-01-22 ENCOUNTER — THERAPY VISIT (OUTPATIENT)
Dept: PHYSICAL THERAPY | Facility: CLINIC | Age: 63
End: 2018-01-22
Payer: COMMERCIAL

## 2018-01-22 DIAGNOSIS — Z96.651 AFTERCARE FOLLOWING RIGHT KNEE JOINT REPLACEMENT SURGERY: ICD-10-CM

## 2018-01-22 DIAGNOSIS — Z47.1 AFTERCARE FOLLOWING RIGHT KNEE JOINT REPLACEMENT SURGERY: ICD-10-CM

## 2018-01-22 PROCEDURE — 97112 NEUROMUSCULAR REEDUCATION: CPT | Mod: GP | Performed by: PHYSICAL THERAPY ASSISTANT

## 2018-01-22 PROCEDURE — 97110 THERAPEUTIC EXERCISES: CPT | Mod: GP | Performed by: PHYSICAL THERAPY ASSISTANT

## 2018-01-23 ENCOUNTER — DOCUMENTATION ONLY (OUTPATIENT)
Dept: ORTHOPEDICS | Facility: CLINIC | Age: 63
End: 2018-01-23

## 2018-01-23 NOTE — PROGRESS NOTES
1/23/18  Completed form fax'd to  [facility] specialist @[phone], mailed copy to patient and scanned into EPIC.   Erum Robin, Admin Coord. II, Orthopaedics

## 2018-01-24 ENCOUNTER — THERAPY VISIT (OUTPATIENT)
Dept: PHYSICAL THERAPY | Facility: CLINIC | Age: 63
End: 2018-01-24
Payer: COMMERCIAL

## 2018-01-24 DIAGNOSIS — G89.18 POSTOPERATIVE PAIN: ICD-10-CM

## 2018-01-24 DIAGNOSIS — Z96.651 AFTERCARE FOLLOWING RIGHT KNEE JOINT REPLACEMENT SURGERY: ICD-10-CM

## 2018-01-24 DIAGNOSIS — Z47.1 AFTERCARE FOLLOWING RIGHT KNEE JOINT REPLACEMENT SURGERY: ICD-10-CM

## 2018-01-24 PROCEDURE — 97112 NEUROMUSCULAR REEDUCATION: CPT | Mod: GP | Performed by: PHYSICAL THERAPY ASSISTANT

## 2018-01-24 PROCEDURE — 97110 THERAPEUTIC EXERCISES: CPT | Mod: GP | Performed by: PHYSICAL THERAPY ASSISTANT

## 2018-01-24 RX ORDER — HYDROCODONE BITARTRATE AND ACETAMINOPHEN 5; 325 MG/1; MG/1
1-2 TABLET ORAL EVERY 4 HOURS PRN
Qty: 60 TABLET | Refills: 0 | Status: SHIPPED | OUTPATIENT
Start: 2018-01-24 | End: 2018-03-28

## 2018-01-24 RX ORDER — HYDROCODONE BITARTRATE AND ACETAMINOPHEN 5; 325 MG/1; MG/1
1-2 TABLET ORAL EVERY 4 HOURS PRN
Qty: 60 TABLET | Refills: 0 | Status: SHIPPED | OUTPATIENT
Start: 2018-01-24 | End: 2018-01-24

## 2018-01-29 ENCOUNTER — THERAPY VISIT (OUTPATIENT)
Dept: PHYSICAL THERAPY | Facility: CLINIC | Age: 63
End: 2018-01-29
Payer: COMMERCIAL

## 2018-01-29 DIAGNOSIS — Z96.651 AFTERCARE FOLLOWING RIGHT KNEE JOINT REPLACEMENT SURGERY: ICD-10-CM

## 2018-01-29 DIAGNOSIS — Z47.1 AFTERCARE FOLLOWING RIGHT KNEE JOINT REPLACEMENT SURGERY: ICD-10-CM

## 2018-01-29 PROCEDURE — 97112 NEUROMUSCULAR REEDUCATION: CPT | Mod: GP | Performed by: PHYSICAL THERAPY ASSISTANT

## 2018-01-29 PROCEDURE — 97110 THERAPEUTIC EXERCISES: CPT | Mod: GP | Performed by: PHYSICAL THERAPY ASSISTANT

## 2018-02-02 ENCOUNTER — THERAPY VISIT (OUTPATIENT)
Dept: PHYSICAL THERAPY | Facility: CLINIC | Age: 63
End: 2018-02-02
Payer: COMMERCIAL

## 2018-02-02 DIAGNOSIS — Z47.1 AFTERCARE FOLLOWING RIGHT KNEE JOINT REPLACEMENT SURGERY: ICD-10-CM

## 2018-02-02 DIAGNOSIS — Z96.651 AFTERCARE FOLLOWING RIGHT KNEE JOINT REPLACEMENT SURGERY: ICD-10-CM

## 2018-02-02 PROCEDURE — 97110 THERAPEUTIC EXERCISES: CPT | Mod: GP | Performed by: PHYSICAL THERAPY ASSISTANT

## 2018-02-02 PROCEDURE — 97112 NEUROMUSCULAR REEDUCATION: CPT | Mod: GP | Performed by: PHYSICAL THERAPY ASSISTANT

## 2018-02-07 DIAGNOSIS — Z96.651 AFTERCARE FOLLOWING RIGHT KNEE JOINT REPLACEMENT SURGERY: Primary | ICD-10-CM

## 2018-02-07 DIAGNOSIS — Z47.1 AFTERCARE FOLLOWING RIGHT KNEE JOINT REPLACEMENT SURGERY: Primary | ICD-10-CM

## 2018-02-09 ENCOUNTER — THERAPY VISIT (OUTPATIENT)
Dept: PHYSICAL THERAPY | Facility: CLINIC | Age: 63
End: 2018-02-09
Payer: COMMERCIAL

## 2018-02-09 DIAGNOSIS — Z96.651 AFTERCARE FOLLOWING RIGHT KNEE JOINT REPLACEMENT SURGERY: ICD-10-CM

## 2018-02-09 DIAGNOSIS — Z47.1 AFTERCARE FOLLOWING RIGHT KNEE JOINT REPLACEMENT SURGERY: ICD-10-CM

## 2018-02-09 PROCEDURE — 97110 THERAPEUTIC EXERCISES: CPT | Mod: GP | Performed by: PHYSICAL THERAPY ASSISTANT

## 2018-02-09 PROCEDURE — 97112 NEUROMUSCULAR REEDUCATION: CPT | Mod: GP | Performed by: PHYSICAL THERAPY ASSISTANT

## 2018-02-09 ASSESSMENT — ACTIVITIES OF DAILY LIVING (ADL)
RAW_SCORE: 50
WALK: ACTIVITY IS NOT DIFFICULT
LIMPING: THE SYMPTOM AFFECTS MY ACTIVITY SLIGHTLY
GO DOWN STAIRS: ACTIVITY IS SOMEWHAT DIFFICULT
RISE FROM A CHAIR: ACTIVITY IS MINIMALLY DIFFICULT
KNEE_ACTIVITY_OF_DAILY_LIVING_SUM: 50
GIVING WAY, BUCKLING OR SHIFTING OF KNEE: I DO NOT HAVE THE SYMPTOM
GO UP STAIRS: ACTIVITY IS MINIMALLY DIFFICULT
HOW_WOULD_YOU_RATE_THE_CURRENT_FUNCTION_OF_YOUR_KNEE_DURING_YOUR_USUAL_DAILY_ACTIVITIES_ON_A_SCALE_FROM_0_TO_100_WITH_100_BEING_YOUR_LEVEL_OF_KNEE_FUNCTION_PRIOR_TO_YOUR_INJURY_AND_0_BEING_THE_INABILITY_TO_PERFORM_ANY_OF_YOUR_USUAL_DAILY_ACTIVITIES?: 80
STIFFNESS: I HAVE THE SYMPTOM BUT IT DOES NOT AFFECT MY ACTIVITY
WEAKNESS: THE SYMPTOM AFFECTS MY ACTIVITY SLIGHTLY
AS_A_RESULT_OF_YOUR_KNEE_INJURY,_HOW_WOULD_YOU_RATE_YOUR_CURRENT_LEVEL_OF_DAILY_ACTIVITY?: NEARLY NORMAL
SIT WITH YOUR KNEE BENT: ACTIVITY IS NOT DIFFICULT
HOW_WOULD_YOU_RATE_THE_OVERALL_FUNCTION_OF_YOUR_KNEE_DURING_YOUR_USUAL_DAILY_ACTIVITIES?: NEARLY NORMAL
STAND: ACTIVITY IS NOT DIFFICULT
KNEE_ACTIVITY_OF_DAILY_LIVING_SCORE: 71.43
SQUAT: ACTIVITY IS VERY DIFFICULT
SWELLING: I HAVE THE SYMPTOM BUT IT DOES NOT AFFECT MY ACTIVITY
PAIN: I HAVE THE SYMPTOM BUT IT DOES NOT AFFECT MY ACTIVITY
KNEEL ON THE FRONT OF YOUR KNEE: I AM UNABLE TO DO THE ACTIVITY

## 2018-02-09 NOTE — PROGRESS NOTES
Subjective:  HPI       Knee Activity of Daily Living Score: 71.43            Objective:  System    Physical Exam    General     ROS    Assessment/Plan:    DISCHARGE REPORT    Progress reporting period is from 1/10/18 to 2/9/18.      SUBJECTIVE  Subjective changes noted by patient:    Pt has been seen for 9 PT sessions with report of 80% improved. Current symptoms of general R knee soreness, managed with ice and medication. Also reports intermittent clicking sensation-doesn't hurt or cause instability. Pt tolerating ADL's and plans to return to work in the near future.    Current Pain level: 2/10    Initial Pain level: 7/10   Changes in function:  Yes (See Goal flowsheet attached for changes in current functional level)     Adverse reaction to treatment or activity: None     OBJECTIVE  Changes noted in objective findings:    R knee AROM: 0-0-125 deg.   PROM: 1-0-135 deg.   Strength MMT: R quad 5-/5, R hamstring 5/5, R hip abd 5/5.   Pt ambulating without AD, cues to increase push off and knee extension to normalize pattern.   Pt able to perform stairs reciprocally with B railings and fair eccentric control. Did encourage step to pattern with descending when 1 rail only.   Knee outcome tool has improved from 53-71%.

## 2018-02-09 NOTE — LETTER
[facility] ATHLETIC [facility] PHYSICAL THERAPY  [address]  [facility] [address]  [phone]    2018    Re: Kareem Bai   :   1955  MRN:  [id number]   REFERRING PHYSICIAN:   Marcello Beatty    [facility]THLETIC [facility] PHYSICAL THERAPY    Date of Initial Evaluation:  01/10/2018  Visits:  Rxs Used: 9  Reason for Referral:  Aftercare following right knee joint replacement surgery    DISCHARGE REPORT  Progress reporting period is from 1/10/18 to 18.      SUBJECTIVE  HPI  Knee Activity of Daily Living Score: 71.43  Subjective changes noted by patient:    Pt has been seen for 9 PT sessions with report of 80% improved. Current symptoms of general R knee soreness, managed with ice and medication. Also reports intermittent clicking sensation-doesn't hurt or cause instability. Pt tolerating ADL's and plans to return to work in the near future.    Current Pain level: 2/10    Initial Pain level: 7/10   Changes in function:  Yes (See Goal flowsheet attached for changes in current functional level)     Adverse reaction to treatment or activity: None     OBJECTIVE  Changes noted in objective findings:    R knee AROM: 0-0-125 deg.   PROM: 1-0-135 deg.   Strength MMT: R quad 5-/5, R hamstring 5/5, R hip abd 5/5.   Pt ambulating without AD, cues to increase push off and knee extension to normalize pattern.   Pt able to perform stairs reciprocally with B railings and fair eccentric control. Did encourage step to pattern with descending when 1 rail only.   Knee outcome tool has improved from 53-71%.      ASSESSMENT/PLAN  Updated problem list and treatment plan: Diagnosis 1:  S/p R TKA  Pain -  home program  Decreased strength - home program  Decreased function - home program  STG/LTGs have been met:  Yes (See Goal flow sheet completed today.)    Re: Kareem Bai   :   1955    Progress toward STG/LTGs have been made:  Yes  (See Goal flow sheet completed today.)  Assessment of Progress: The patient's condition is improving.  Patient is meeting short term goals and is progressing towards long term goals.  Self Management Plans:  Patient is independent in a home treatment program.  Patient is independent in self management of symptoms.  I have re-evaluated this patient and find that the nature, scope, duration and intensity of the therapy is appropriate for the medical condition of the patient.  Kareem continues to require the following intervention to meet STG and LT's:  PT intervention is no longer required to meet STG/LTG.    Recommendations:  This patient is ready to be discharged from therapy and continue their home treatment program.  The progress note/discharge summary was written in collaboration with and reviewed by the physical therapist.    Thank you for your referral.    INQUIRIES  Therapist: Isabelle Stephens PT  [facility]  [address] [address]  Phone: [phone]  Fax: [phone]

## 2018-02-09 NOTE — MR AVS SNAPSHOT
After Visit Summary   2/9/2018    Kareem Bai    MRN: [id number]           Patient Information     Date Of Birth          1955        Visit Information        Provider Department      2/9/2018 9:00 AM Cecilia Maria PTA [facility] Physical Therapy        Today's Diagnoses     Aftercare following right knee joint replacement surgery           Follow-ups after your visit        Your next 10 appointments already scheduled     Feb 12, 2018 10:25 AM CST   XR SIX FOOT STANDING EXTREMITIES with UCXR4   [facility] Xray ([facility])    [address] [place] [address] [id number] [phone]           Please bring a list of your current medicines to your exam. (Include vitamins, minerals and over-thecounter medicines.) Leave your valuables at home.  Tell your doctor if there is a chance you may be pregnant.  You do not need to do anything special for this exam.            Feb 12, 2018 10:30 AM CST   (Arrive by 10:15 AM)   XR KNEE RIGHT 1/2 VIEWS with [id number]   [facility] XRay ([facility])    [address] [place] [address] [id number] [phone]           Please bring a list of your current medicines to your exam. (Include vitamins, minerals and over-thecounter medicines.) Leave your valuables at home.  Tell your doctor if there is a chance you may be pregnant.  You do not need to do anything special for this exam.            Feb 12, 2018 10:45 AM CST   (Arrive by 10:30 AM)   Return Visit with Marcello Beatty MD   [facility] ([facility])    [address] [place] [address] [id number] [phone]              Who to contact     If you have questions or need follow up information about today's clinic visit or your schedule please contact [facility]  "PHYSICAL THERAPY directly at [phone].  Normal or non-critical lab and imaging results will be communicated to you by MyChart, letter or phone within 4 business days after the clinic has received the results. If you do not hear from us within 7 days, please contact the clinic through [employer] or phone. If you have a critical or abnormal lab result, we will notify you by phone as soon as possible.  Submit refill requests through [employer] or call your pharmacy and they will forward the refill request to us. Please allow 3 business days for your refill to be completed.          Additional Information About Your Visit        [employer] Information     [employer] lets you send messages to your doctor, view your test results, renew your prescriptions, schedule appointments and more. To sign up, go to [email or web address] . Click on \"Log in\" on the left side of the screen, which will take you to the Welcome page. Then click on \"Sign up Now\" on the right side of the page.     You will be asked to enter the access code listed below, as well as some personal information. Please follow the directions to create your username and password.     Your access code is: [email or web address]  Expires: 2018  6:30 AM     Your access code will  in 90 days. If you need help or a new code, please call your [facility] clinic or [phone].        Care EveryWhere ID     This is your Care EveryWhere ID. This could be used by other organizations to access your [facility] medical records  [id number]         Blood Pressure from Last 3 Encounters:   18 137/69    Weight from Last 3 Encounters:   18 98.9 kg (218 lb)   18 101.1 kg (222 lb 14.2 oz)   17 105.2 kg (232 lb)              We Performed the Following     NEUROMUSCULAR RE-EDUCATION     THERAPEUTIC EXERCISES        Primary Care Provider Office Phone # Fax #    [facility] [phone] [address]  [facility] [address]      "   Equal Access to Services     [facility] : Hadii aad ku hadporschetalya Katiepuma, wazandrada luqadaha, qaybta gordysitajasbir lr. So [facility] [phone].    ATENCIÓN: Si habla fiona, tiene a guaman disposición servicios gratuitos de asistencia lingüística. Jeriame al [phone].    We comply with applicable federal civil rights laws and [place] laws. We do not discriminate on the basis of race, color, national origin, age, disability, sex, sexual orientation, or gender identity.            Thank you!     Thank you for choosing [facility] PHYSICAL THERAPY  for your care. Our goal is always to provide you with excellent care. Hearing back from our patients is one way we can continue to improve our services. Please take a few minutes to complete the written survey that you may receive in the mail after your visit with us. Thank you!             Your Updated Medication List - Protect others around you: Learn how to safely use, store and throw away your medicines at www.disposemymeds.org.          This list is accurate as of 2/9/18 10:13 AM.  Always use your most recent med list.                   Brand Name Dispense Instructions for use Diagnosis    aspirin - buffered 325 MG Tabs tablet    ASCRIPTIN    28 each    Take 1 tablet (325 mg) by mouth daily    History of total right knee replacement       HYDROcodone-acetaminophen 5-325 MG per tablet    NORCO    60 tablet    Take 1-2 tablets by mouth every 4 hours as needed for moderate to severe pain (max 10/day)    Postoperative pain       ibuprofen 800 MG tablet    ADVIL/MOTRIN     Take 800 mg by mouth        lisinopril-hydrochlorothiazide 20-25 MG per tablet    PRINZIDE/ZESTORETIC          pravastatin 10 MG tablet    PRAVACHOL     TAKE ONE TABLET BY MOUTH AT BEDTIME

## 2018-02-09 NOTE — PROGRESS NOTES
Subjective:  HPI       Knee Activity of Daily Living Score: 71.43            Objective:  System    Physical Exam    General     ROS    Assessment/Plan:    ASSESSMENT/PLAN  Updated problem list and treatment plan: Diagnosis 1:  S/p R TKA  Pain -  home program  Decreased strength - home program  Decreased function - home program  STG/LTGs have been met:  Yes (See Goal flow sheet completed today.)  Progress toward STG/LTGs have been made:  Yes (See Goal flow sheet completed today.)  Assessment of Progress: The patient's condition is improving.  Patient is meeting short term goals and is progressing towards long term goals.  Self Management Plans:  Patient is independent in a home treatment program.  Patient is independent in self management of symptoms.  I have re-evaluated this patient and find that the nature, scope, duration and intensity of the therapy is appropriate for the medical condition of the patient.  Kareem continues to require the following intervention to meet STG and LT's:  PT intervention is no longer required to meet STG/LTG.    Recommendations:  This patient is ready to be discharged from therapy and continue their home treatment program.  The progress note/discharge summary was written in collaboration with and reviewed by the physical therapist.    Please refer to the daily flowsheet for treatment today, total treatment time and time spent performing 1:1 timed codes.

## 2018-02-12 ENCOUNTER — RADIANT APPOINTMENT (OUTPATIENT)
Dept: GENERAL RADIOLOGY | Facility: CLINIC | Age: 63
End: 2018-02-12
Attending: ORTHOPAEDIC SURGERY
Payer: COMMERCIAL

## 2018-02-12 ENCOUNTER — OFFICE VISIT (OUTPATIENT)
Dept: ORTHOPEDICS | Facility: CLINIC | Age: 63
End: 2018-02-12
Payer: COMMERCIAL

## 2018-02-12 DIAGNOSIS — Z96.651 AFTERCARE FOLLOWING RIGHT KNEE JOINT REPLACEMENT SURGERY: ICD-10-CM

## 2018-02-12 DIAGNOSIS — Z96.651 STATUS POST TOTAL RIGHT KNEE REPLACEMENT: Primary | ICD-10-CM

## 2018-02-12 DIAGNOSIS — Z47.1 AFTERCARE FOLLOWING RIGHT KNEE JOINT REPLACEMENT SURGERY: ICD-10-CM

## 2018-02-12 RX ORDER — HYDROCODONE BITARTRATE AND ACETAMINOPHEN 5; 325 MG/1; MG/1
1 TABLET ORAL EVERY 6 HOURS PRN
Qty: 30 TABLET | Refills: 0 | Status: SHIPPED | OUTPATIENT
Start: 2018-02-12 | End: 2018-03-28

## 2018-02-12 NOTE — MR AVS SNAPSHOT
After Visit Summary   2018    Kareem Bai    MRN: [id number]           Patient Information     Date Of Birth          1955        Visit Information        Provider Department      2018 10:45 AM Marcello Beatty MD [facility]        Today's Diagnoses     Status post total right knee replacement    -  1       Follow-ups after your visit        Who to contact     Please call your clinic at [phone] to:    Ask questions about your health    Make or cancel appointments    Discuss your medicines    Learn about your test results    Speak to your doctor            Additional Information About Your Visit        MyChart Information     [employer] is an electronic gateway that provides easy, online access to your medical records. With [employer], you can request a clinic appointment, read your test results, renew a prescription or communicate with your care team.     To sign up for Yeddat visit the website at [email or web address]   You will be asked to enter the access code listed below, as well as some personal information. Please follow the directions to create your username and password.     Your access code is: [email or web address]  Expires: 2018  6:30 AM     Your access code will  in 90 days. If you need help or a new code, please contact your [facility] or call [phone] for assistance.        Care EveryWhere ID     This is your Care EveryWhere ID. This could be used by other organizations to access your [facility] medical records  [id number]         Blood Pressure from Last 3 Encounters:   18 137/69    Weight from Last 3 Encounters:   18 98.9 kg (218 lb)   18 101.1 kg (222 lb 14.2 oz)   17 105.2 kg (232 lb)              Today, you had the following     No orders found for display         Today's Medication Changes          These changes are accurate as of 18 11:27 AM.  If you have any questions,  ask your nurse or doctor.               These medicines have changed or have updated prescriptions.        Dose/Directions    * HYDROcodone-acetaminophen 5-325 MG per tablet   Commonly known as:  NORCO   This may have changed:  Another medication with the same name was added. Make sure you understand how and when to take each.   Used for:  Postoperative pain   Changed by:  Marcello Beatty MD        Dose:  1-2 tablet   Take 1-2 tablets by mouth every 4 hours as needed for moderate to severe pain (max 10/day)   Quantity:  60 tablet   Refills:  0       * HYDROcodone-acetaminophen 5-325 MG per tablet   Commonly known as:  NORCO   This may have changed:  You were already taking a medication with the same name, and this prescription was added. Make sure you understand how and when to take each.   Used for:  Status post total right knee replacement   Changed by:  Marcello Beatty MD        Dose:  1 tablet   Take 1 tablet by mouth every 6 hours as needed for moderate to severe pain   Quantity:  30 tablet   Refills:  0       * Notice:  This list has 2 medication(s) that are the same as other medications prescribed for you. Read the directions carefully, and ask your doctor or other care provider to review them with you.         Where to get your medicines      Some of these will need a paper prescription and others can be bought over the counter.  Ask your nurse if you have questions.     Bring a paper prescription for each of these medications     HYDROcodone-acetaminophen 5-325 MG per tablet                Primary Care Provider Office Phone # Fax #    [facility] [phone]       [address]  [facility] [address]        Equal Access to Services     [facility]: Erick brysono [place], waaxda luqadaha, qaybta kaalmada jasbir lentz. So [facility] [phone].    ATENCIÓN: Si habla español, tiene a guaman disposición servicios gratuitos de  edniaa lingüística. Kandy al [phone].    We comply with applicable federal civil rights laws and [place] laws. We do not discriminate on the basis of race, color, national origin, age, disability, sex, sexual orientation, or gender identity.            Thank you!     Thank you for choosing [facility] ORTHOPAEDIC CLINIC  for your care. Our goal is always to provide you with excellent care. Hearing back from our patients is one way we can continue to improve our services. Please take a few minutes to complete the written survey that you may receive in the mail after your visit with us. Thank you!             Your Updated Medication List - Protect others around you: Learn how to safely use, store and throw away your medicines at www.disposemymeds.org.          This list is accurate as of 2/12/18 11:27 AM.  Always use your most recent med list.                   Brand Name Dispense Instructions for use Diagnosis    aspirin - buffered 325 MG Tabs tablet    ASCRIPTIN    28 each    Take 1 tablet (325 mg) by mouth daily    History of total right knee replacement       * HYDROcodone-acetaminophen 5-325 MG per tablet    NORCO    60 tablet    Take 1-2 tablets by mouth every 4 hours as needed for moderate to severe pain (max 10/day)    Postoperative pain       * HYDROcodone-acetaminophen 5-325 MG per tablet    NORCO    30 tablet    Take 1 tablet by mouth every 6 hours as needed for moderate to severe pain    Status post total right knee replacement       ibuprofen 800 MG tablet    ADVIL/MOTRIN     Take 800 mg by mouth        lisinopril-hydrochlorothiazide 20-25 MG per tablet    PRINZIDE/ZESTORETIC          pravastatin 10 MG tablet    PRAVACHOL     TAKE ONE TABLET BY MOUTH AT BEDTIME        * Notice:  This list has 2 medication(s) that are the same as other medications prescribed for you. Read the directions carefully, and ask your doctor or other care provider to review them with you.

## 2018-02-12 NOTE — LETTER
"2/12/2018       RE: Kareem Bai  [address]  [facility] [address]     Dear Colleague,    Thank you for referring your patient, Kareem Bai, to the [facility] ORTHOP[facility]IC [facility]IC at [facility]. Please see a copy of my visit note below.    Kareem is seen 6 weeks post of his right total knee replacement done on 1/4/2018. He reports doing well without concerns except for \"an occasional nonpainful click\". Reports some night achiness with \"a busy day\". Denies instabilty and states \"knee is already better than before\". Incision is well healed. Strength symmetrical. No acute neuro deficits. Ligaments are stable.    Xrays taken show near neutral alignment with good position of tibial and femoral arthroplasty.    Dx: R TKA    Plan:  1. OK to return to work on 3/1/18.   2. Refill vicodin  3. F/u 3-4 months for xrays on arrival.    I, Dr. Marcello Beatty,  have seen and examined this patient with Magda Rowan APRN, CNP.      Again, thank you for allowing me to participate in the care of your patient.      Sincerely,    Marcello Beatty MD      "

## 2018-02-12 NOTE — PROGRESS NOTES
"Kareem is seen 6 weeks post of his right total knee replacement done on 1/4/2018. He reports doing well without concerns except for \"an occasional nonpainful click\". Reports some night achiness with \"a busy day\". Denies instabilty and states \"knee is already better than before\". Incision is well healed. Strength symmetrical. No acute neuro deficits. Ligaments are stable.    Xrays taken show near neutral alignment with good position of tibial and femoral arthroplasty.    Dx: R TKA    Plan:  1. OK to return to work on 3/1/18.   2. Refill vicodin  3. F/u 3-4 months for xrays on arrival.    I, Dr. Marcello Beatty,  have seen and examined this patient with REE Eckert, CNP.    "

## 2018-02-12 NOTE — LETTER
Return to Work  2018     Seen today: yes    Patient:  Kareem Bai  :   1955  MRN:     [id number]  Physician: BLAYNE BEATTY    Kareem Bai may return to work on Date: 3/1/2018.      The next clinic appointment is scheduled for 3-4 months.    Patient limitations:  No restrictions alternating standing with sitting.              Electronically signed by Blayne Beatty MD

## 2018-03-12 ENCOUNTER — APPOINTMENT (OUTPATIENT)
Dept: CT IMAGING | Facility: CLINIC | Age: 63
End: 2018-03-12
Attending: FAMILY MEDICINE
Payer: COMMERCIAL

## 2018-03-12 ENCOUNTER — HOSPITAL ENCOUNTER (EMERGENCY)
Facility: CLINIC | Age: 63
Discharge: HOME | End: 2018-03-12
Attending: FAMILY MEDICINE | Admitting: FAMILY MEDICINE
Payer: COMMERCIAL

## 2018-03-12 VITALS
OXYGEN SATURATION: 98 % | RESPIRATION RATE: 16 BRPM | BODY MASS INDEX: 30.34 KG/M2 | WEIGHT: 230 LBS | DIASTOLIC BLOOD PRESSURE: 83 MMHG | SYSTOLIC BLOOD PRESSURE: 103 MMHG | TEMPERATURE: 97.5 F | HEART RATE: 76 BPM
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
## 2018-03-12 DIAGNOSIS — S16.1XXA STRAIN OF NECK MUSCLE, INITIAL ENCOUNTER: ICD-10-CM

## 2018-03-12 DIAGNOSIS — M54.50 ACUTE BILATERAL LOW BACK PAIN WITHOUT SCIATICA: ICD-10-CM

## 2018-03-12 PROCEDURE — 72131 CT LUMBAR SPINE W/O DYE: CPT

## 2018-03-12 PROCEDURE — 99283 EMERGENCY DEPT VISIT LOW MDM: CPT | Mod: Z6 | Performed by: FAMILY MEDICINE

## 2018-03-12 PROCEDURE — 99284 EMERGENCY DEPT VISIT MOD MDM: CPT | Mod: 25 | Performed by: FAMILY MEDICINE

## 2018-03-12 PROCEDURE — 96374 THER/PROPH/DIAG INJ IV PUSH: CPT | Performed by: FAMILY MEDICINE

## 2018-03-12 PROCEDURE — 70450 CT HEAD/BRAIN W/O DYE: CPT

## 2018-03-12 PROCEDURE — 72125 CT NECK SPINE W/O DYE: CPT

## 2018-03-12 PROCEDURE — 25000128 H RX IP 250 OP 636: Performed by: FAMILY MEDICINE

## 2018-03-12 RX ORDER — HYDROCODONE BITARTRATE AND ACETAMINOPHEN 5; 325 MG/1; MG/1
1-2 TABLET ORAL EVERY 4 HOURS PRN
Qty: 10 TABLET | Refills: 0 | Status: SHIPPED | OUTPATIENT
Start: 2018-03-12 | End: 2021-10-12

## 2018-03-12 RX ORDER — IBUPROFEN 800 MG/1
800 TABLET, FILM COATED ORAL EVERY 8 HOURS PRN
Qty: 15 TABLET | Refills: 0 | Status: SHIPPED | OUTPATIENT
Start: 2018-03-12 | End: 2018-03-17

## 2018-03-12 RX ORDER — CYCLOBENZAPRINE HCL 10 MG
10 TABLET ORAL 3 TIMES DAILY PRN
Qty: 20 TABLET | Refills: 0 | Status: SHIPPED | OUTPATIENT
Start: 2018-03-12 | End: 2018-03-18

## 2018-03-12 RX ORDER — KETOROLAC TROMETHAMINE 30 MG/ML
30 INJECTION, SOLUTION INTRAMUSCULAR; INTRAVENOUS ONCE
Status: COMPLETED | OUTPATIENT
Start: 2018-03-12 | End: 2018-03-12

## 2018-03-12 RX ADMIN — KETOROLAC TROMETHAMINE 30 MG: 30 INJECTION, SOLUTION INTRAMUSCULAR at 10:09

## 2018-03-12 ASSESSMENT — ENCOUNTER SYMPTOMS
DYSURIA: 0
LIGHT-HEADEDNESS: 0
SORE THROAT: 0
SINUS PRESSURE: 0
CHILLS: 0
WOUND: 0
FATIGUE: 0
NUMBNESS: 0
NERVOUS/ANXIOUS: 0
CONSTIPATION: 0
DECREASED CONCENTRATION: 1
MYALGIAS: 0
BACK PAIN: 1
EYE PAIN: 0
NECK STIFFNESS: 1
WHEEZING: 0
PALPITATIONS: 0
ACTIVITY CHANGE: 0
SINUS PAIN: 0
NAUSEA: 0
COUGH: 0
COLOR CHANGE: 0
ABDOMINAL PAIN: 0
SHORTNESS OF BREATH: 0
SEIZURES: 0
VOMITING: 0
ARTHRALGIAS: 0
CONFUSION: 1
FACIAL SWELLING: 0
PHOTOPHOBIA: 0
DIARRHEA: 0
JOINT SWELLING: 0
NECK PAIN: 1
FEVER: 0
TREMORS: 0
HEADACHES: 0
RHINORRHEA: 0
DIZZINESS: 0
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
## 2018-03-12 NOTE — ED AVS SNAPSHOT
[facility], [place], Emergency Department    [address]    [facility] [address]    Phone:  [phone]    Fax:  [phone]                                       Kareem Bai   MRN: [id number]    Department:  [facility], Emergency Department   Date of Visit:  3/12/2018           After Visit Summary Signature Page     I have received my discharge instructions, and my questions have been answered. I have discussed any challenges I see with this plan with the nurse or doctor.    ..........................................................................................................................................  Patient/Patient Representative Signature      ..........................................................................................................................................  Patient Representative Print Name and Relationship to Patient    ..................................................               ................................................  Date                                            Time    ..........................................................................................................................................  Reviewed by Signature/Title    ...................................................              ..............................................  Date                                                            Time

## 2018-03-19 ENCOUNTER — THERAPY VISIT (OUTPATIENT)
Dept: PHYSICAL THERAPY | Facility: CLINIC | Age: 63
End: 2018-03-19
Payer: COMMERCIAL

## 2018-03-19 DIAGNOSIS — M54.2 CERVICALGIA: Primary | ICD-10-CM

## 2018-03-19 DIAGNOSIS — M54.50 ACUTE BILATERAL LOW BACK PAIN WITHOUT SCIATICA: ICD-10-CM

## 2018-03-19 PROCEDURE — 97112 NEUROMUSCULAR REEDUCATION: CPT | Mod: GP | Performed by: PHYSICAL THERAPIST

## 2018-03-19 PROCEDURE — 97110 THERAPEUTIC EXERCISES: CPT | Mod: GP | Performed by: PHYSICAL THERAPIST

## 2018-03-19 PROCEDURE — 97161 PT EVAL LOW COMPLEX 20 MIN: CPT | Mod: GP | Performed by: PHYSICAL THERAPIST
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
## 2018-03-19 NOTE — MR AVS SNAPSHOT
"              After Visit Summary   3/19/2018    Kareem Bai    MRN: [id number]           Patient Information     Date Of Birth          1955        Visit Information        Provider Department      3/19/2018 7:00 AM Cristina Cobb PT [facility]        Today's Diagnoses     Cervicalgia    -  1    Acute bilateral low back pain without sciatica           Follow-ups after your visit        Your next 10 appointments already scheduled     Mar 23, 2018  8:20 AM CDT   [facility] with Cecilia Maria PTA   [facility] ([facility])    [address]  [facility] [id number] [phone]              Who to contact     If you have questions or need follow up information about today's clinic visit or your schedule please contact [facility] directly at [phone].  Normal or non-critical lab and imaging results will be communicated to you by [employer], letter or phone within 4 business days after the clinic has received the results. If you do not hear from us within 7 days, please contact the clinic through [employer] or phone. If you have a critical or abnormal lab result, we will notify you by phone as soon as possible.  Submit refill requests through [employer] or call your pharmacy and they will forward the refill request to us. Please allow 3 business days for your refill to be completed.          Additional Information About Your Visit        [employer] Information     JustBook lets you send messages to your doctor, view your test results, renew your prescriptions, schedule appointments and more. To sign up, go to [email or web address] . Click on \"Log in\" on the left side of the screen, which will take you to the Welcome page. Then click on \"Sign up Now\" on the right side of the page.     You will be asked to enter the " access code listed below, as well as some personal information. Please follow the directions to create your username and password.     Your access code is: [email or web address]  Expires: 2018  7:30 AM     Your access code will  in 90 days. If you need help or a new code, please call your [place] clinic or [phone].        Care EveryWhere ID     This is your Care EveryWhere ID. This could be used by other organizations to access your [facility] medical records  [id number]         Blood Pressure from Last 3 Encounters:   18 103/83   18 137/69    Weight from Last 3 Encounters:   18 104.3 kg (230 lb)   18 98.9 kg (218 lb)   18 101.1 kg (222 lb 14.2 oz)              We Performed the Following     KAELYN Inital Eval Report     Neuromuscular Re-Education     PT Eval, Low Complexity (03514)     Therapeutic Exercises        Primary Care Provider Office Phone # Fax #    [facility] [phone] [address]  [facility] [address]        Equal Access to Services     [facility] Center[facility]SE M : Hadii aad ku hadasho Soomaali, waaxda luqadaha, qaybta kaalmada adeegyada, waxay michel parkn glenn meyer. So [facility] [phone].    ATENCIÓN: Si habla español, tiene a guaman disposición servicios gratuitos de asistencia lingüística. Llame al [phone].    We comply with applicable federal civil rights laws and [place] laws. We do not discriminate on the basis of race, color, national origin, age, disability, sex, sexual orientation, or gender identity.            Thank you!     Thank you for choosing [facility] PHYSICAL THERAPY  for your care. Our goal is always to provide you with excellent care. Hearing back from our patients is one way we can continue to improve our services. Please take a few minutes to complete the written survey that you may receive in the mail after your visit with us. Thank you!             Your  Updated Medication List - Protect others around you: Learn how to safely use, store and throw away your medicines at www.disposemymeds.org.          This list is accurate as of 3/19/18  3:21 PM.  Always use your most recent med list.                   Brand Name Dispense Instructions for use Diagnosis    aspirin - buffered 325 MG Tabs tablet    ASCRIPTIN    28 each    Take 1 tablet (325 mg) by mouth daily    History of total right knee replacement       * HYDROcodone-acetaminophen 5-325 MG per tablet    NORCO    60 tablet    Take 1-2 tablets by mouth every 4 hours as needed for moderate to severe pain (max 10/day)    Postoperative pain       * HYDROcodone-acetaminophen 5-325 MG per tablet    NORCO    30 tablet    Take 1 tablet by mouth every 6 hours as needed for moderate to severe pain    Status post total right knee replacement       * HYDROcodone-acetaminophen 5-325 MG per tablet    NORCO    10 tablet    Take 1-2 tablets by mouth every 4 hours as needed for moderate to severe pain        ibuprofen 800 MG tablet    ADVIL/MOTRIN     Take 800 mg by mouth        lisinopril-hydrochlorothiazide 20-25 MG per tablet    PRINZIDE/ZESTORETIC          pravastatin 10 MG tablet    PRAVACHOL     TAKE ONE TABLET BY MOUTH AT BEDTIME        * Notice:  This list has 3 medication(s) that are the same as other medications prescribed for you. Read the directions carefully, and ask your doctor or other care provider to review them with you.
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
## 2018-03-23 ENCOUNTER — THERAPY VISIT (OUTPATIENT)
Dept: PHYSICAL THERAPY | Facility: CLINIC | Age: 63
End: 2018-03-23
Payer: COMMERCIAL

## 2018-03-23 DIAGNOSIS — M54.2 CERVICALGIA: ICD-10-CM

## 2018-03-23 DIAGNOSIS — M54.50 ACUTE BILATERAL LOW BACK PAIN WITHOUT SCIATICA: ICD-10-CM

## 2018-03-23 PROCEDURE — 97110 THERAPEUTIC EXERCISES: CPT | Mod: GP | Performed by: PHYSICAL THERAPY ASSISTANT

## 2018-03-23 PROCEDURE — 97140 MANUAL THERAPY 1/> REGIONS: CPT | Mod: GP | Performed by: PHYSICAL THERAPY ASSISTANT

## 2018-03-26 ENCOUNTER — THERAPY VISIT (OUTPATIENT)
Dept: PHYSICAL THERAPY | Facility: CLINIC | Age: 63
End: 2018-03-26
Payer: COMMERCIAL

## 2018-03-26 DIAGNOSIS — M54.2 CERVICALGIA: ICD-10-CM

## 2018-03-26 DIAGNOSIS — M54.50 ACUTE BILATERAL LOW BACK PAIN WITHOUT SCIATICA: ICD-10-CM

## 2018-03-26 PROCEDURE — 97140 MANUAL THERAPY 1/> REGIONS: CPT | Mod: GP | Performed by: PHYSICAL THERAPY ASSISTANT

## 2018-03-26 PROCEDURE — 97110 THERAPEUTIC EXERCISES: CPT | Mod: GP | Performed by: PHYSICAL THERAPY ASSISTANT

## 2018-03-28 DIAGNOSIS — Z96.651 HISTORY OF TOTAL RIGHT KNEE REPLACEMENT: Primary | ICD-10-CM

## 2018-04-04 ENCOUNTER — THERAPY VISIT (OUTPATIENT)
Dept: PHYSICAL THERAPY | Facility: CLINIC | Age: 63
End: 2018-04-04
Payer: COMMERCIAL

## 2018-04-04 DIAGNOSIS — M54.2 CERVICALGIA: ICD-10-CM

## 2018-04-04 DIAGNOSIS — M54.50 ACUTE BILATERAL LOW BACK PAIN WITHOUT SCIATICA: ICD-10-CM

## 2018-04-04 PROCEDURE — 97110 THERAPEUTIC EXERCISES: CPT | Mod: GP | Performed by: PHYSICAL THERAPY ASSISTANT

## 2018-04-04 PROCEDURE — 97140 MANUAL THERAPY 1/> REGIONS: CPT | Mod: GP | Performed by: PHYSICAL THERAPY ASSISTANT

## 2018-04-05 ENCOUNTER — OFFICE VISIT (OUTPATIENT)
Dept: ORTHOPEDICS | Facility: CLINIC | Age: 63
End: 2018-04-05
Payer: COMMERCIAL

## 2018-04-05 ENCOUNTER — RADIANT APPOINTMENT (OUTPATIENT)
Dept: GENERAL RADIOLOGY | Facility: CLINIC | Age: 63
End: 2018-04-05
Attending: NURSE PRACTITIONER
Payer: COMMERCIAL

## 2018-04-05 DIAGNOSIS — Z96.651 STATUS POST TOTAL RIGHT KNEE REPLACEMENT: Primary | ICD-10-CM

## 2018-04-05 DIAGNOSIS — Z96.651 HISTORY OF TOTAL RIGHT KNEE REPLACEMENT: ICD-10-CM
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
## 2018-04-05 DIAGNOSIS — Z96.651 STATUS POST TOTAL RIGHT KNEE REPLACEMENT: ICD-10-CM

## 2018-04-05 ASSESSMENT — ENCOUNTER SYMPTOMS
NUMBNESS: 0
NIGHT SWEATS: 0
TROUBLE SWALLOWING: 0
STIFFNESS: 0
MYALGIAS: 1
HALLUCINATIONS: 0
SEIZURES: 0
LOSS OF CONSCIOUSNESS: 0
TINGLING: 0
WEIGHT LOSS: 0
TASTE DISTURBANCE: 0
HEADACHES: 1
HOARSE VOICE: 0
MUSCLE CRAMPS: 0
SORE THROAT: 0
JOINT SWELLING: 1
NECK MASS: 0
POLYPHAGIA: 0
INCREASED ENERGY: 1
MEMORY LOSS: 1
POLYDIPSIA: 0
ARTHRALGIAS: 1
NERVOUS/ANXIOUS: 1
CHILLS: 0
ALTERED TEMPERATURE REGULATION: 1
SINUS CONGESTION: 0
PANIC: 0
FEVER: 0
MUSCLE WEAKNESS: 1
DECREASED APPETITE: 0
WEAKNESS: 1
SINUS PAIN: 0
TREMORS: 0
DIZZINESS: 1
SPEECH CHANGE: 0
WEIGHT GAIN: 0
SMELL DISTURBANCE: 0
DEPRESSION: 1
BACK PAIN: 1
FATIGUE: 1
DECREASED CONCENTRATION: 1
DISTURBANCES IN COORDINATION: 1
INSOMNIA: 0
NECK PAIN: 1
PARALYSIS: 0
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
## 2018-04-06 ENCOUNTER — THERAPY VISIT (OUTPATIENT)
Dept: PHYSICAL THERAPY | Facility: CLINIC | Age: 63
End: 2018-04-06
Payer: COMMERCIAL

## 2018-04-06 DIAGNOSIS — M54.50 ACUTE BILATERAL LOW BACK PAIN WITHOUT SCIATICA: ICD-10-CM

## 2018-04-06 DIAGNOSIS — M54.2 CERVICALGIA: ICD-10-CM

## 2018-04-06 PROCEDURE — 97140 MANUAL THERAPY 1/> REGIONS: CPT | Mod: GP | Performed by: PHYSICAL THERAPY ASSISTANT

## 2018-04-06 PROCEDURE — 97110 THERAPEUTIC EXERCISES: CPT | Mod: GP | Performed by: PHYSICAL THERAPY ASSISTANT

## 2018-04-09 ENCOUNTER — THERAPY VISIT (OUTPATIENT)
Dept: PHYSICAL THERAPY | Facility: CLINIC | Age: 63
End: 2018-04-09
Payer: COMMERCIAL

## 2018-04-09 DIAGNOSIS — M54.50 ACUTE BILATERAL LOW BACK PAIN WITHOUT SCIATICA: ICD-10-CM

## 2018-04-09 DIAGNOSIS — M54.2 CERVICALGIA: ICD-10-CM

## 2018-04-09 PROCEDURE — 97140 MANUAL THERAPY 1/> REGIONS: CPT | Mod: GP | Performed by: PHYSICAL THERAPY ASSISTANT

## 2018-04-09 PROCEDURE — 97110 THERAPEUTIC EXERCISES: CPT | Mod: GP | Performed by: PHYSICAL THERAPY ASSISTANT

## 2018-04-09 PROCEDURE — 97112 NEUROMUSCULAR REEDUCATION: CPT | Mod: GP | Performed by: PHYSICAL THERAPY ASSISTANT

## 2018-04-13 ENCOUNTER — THERAPY VISIT (OUTPATIENT)
Dept: PHYSICAL THERAPY | Facility: CLINIC | Age: 63
End: 2018-04-13
Payer: COMMERCIAL

## 2018-04-13 DIAGNOSIS — M54.2 CERVICALGIA: ICD-10-CM

## 2018-04-13 DIAGNOSIS — M54.50 ACUTE BILATERAL LOW BACK PAIN WITHOUT SCIATICA: ICD-10-CM

## 2018-04-13 PROCEDURE — 97140 MANUAL THERAPY 1/> REGIONS: CPT | Mod: GP | Performed by: PHYSICAL THERAPY ASSISTANT

## 2018-04-13 PROCEDURE — 97110 THERAPEUTIC EXERCISES: CPT | Mod: GP | Performed by: PHYSICAL THERAPY ASSISTANT

## 2018-04-16 ENCOUNTER — THERAPY VISIT (OUTPATIENT)
Dept: PHYSICAL THERAPY | Facility: CLINIC | Age: 63
End: 2018-04-16
Payer: COMMERCIAL

## 2018-04-16 DIAGNOSIS — M54.50 ACUTE BILATERAL LOW BACK PAIN WITHOUT SCIATICA: ICD-10-CM

## 2018-04-16 DIAGNOSIS — M54.2 CERVICALGIA: ICD-10-CM

## 2018-04-16 PROCEDURE — 97530 THERAPEUTIC ACTIVITIES: CPT | Mod: GP | Performed by: PHYSICAL THERAPIST

## 2018-04-16 PROCEDURE — 97140 MANUAL THERAPY 1/> REGIONS: CPT | Mod: GP | Performed by: PHYSICAL THERAPIST

## 2018-04-18 ENCOUNTER — THERAPY VISIT (OUTPATIENT)
Dept: PHYSICAL THERAPY | Facility: CLINIC | Age: 63
End: 2018-04-18
Payer: COMMERCIAL

## 2018-04-18 DIAGNOSIS — M54.50 ACUTE BILATERAL LOW BACK PAIN WITHOUT SCIATICA: ICD-10-CM

## 2018-04-18 DIAGNOSIS — M54.2 CERVICALGIA: ICD-10-CM

## 2018-04-18 PROCEDURE — 97110 THERAPEUTIC EXERCISES: CPT | Mod: GP | Performed by: PHYSICAL THERAPY ASSISTANT

## 2018-04-18 PROCEDURE — 97140 MANUAL THERAPY 1/> REGIONS: CPT | Mod: GP | Performed by: PHYSICAL THERAPY ASSISTANT

## 2018-04-30 ENCOUNTER — THERAPY VISIT (OUTPATIENT)
Dept: PHYSICAL THERAPY | Facility: CLINIC | Age: 63
End: 2018-04-30
Payer: COMMERCIAL

## 2018-04-30 DIAGNOSIS — M54.50 ACUTE BILATERAL LOW BACK PAIN WITHOUT SCIATICA: ICD-10-CM

## 2018-04-30 DIAGNOSIS — M54.2 CERVICALGIA: ICD-10-CM

## 2018-04-30 PROCEDURE — 97110 THERAPEUTIC EXERCISES: CPT | Mod: GP | Performed by: PHYSICAL THERAPY ASSISTANT

## 2018-04-30 PROCEDURE — 97140 MANUAL THERAPY 1/> REGIONS: CPT | Mod: GP | Performed by: PHYSICAL THERAPY ASSISTANT

## 2018-04-30 NOTE — MR AVS SNAPSHOT
"              After Visit Summary   4/30/2018    Kareem Bai    MRN: [id number]           Patient Information     Date Of Birth          1955        Visit Information        Provider Department      4/30/2018 8:20 AM Cecilia Maria PTA [facility]        Today's Diagnoses     Acute bilateral low back pain without sciatica        Cervicalgia           Follow-ups after your visit        Your next 10 appointments already scheduled     May 04, 2018  8:20 AM CDT   [facility] Spine with Cecilia Maria PTA   [facility]    [address]  [facility] [address] [phone]              Who to contact     If you have questions or need follow up information about today's clinic visit or your schedule please contact [facility] PHYSICAL THERAPY directly at [phone].  Normal or non-critical lab and imaging results will be communicated to you by MyChart, letter or phone within 4 business days after the clinic has received the results. If you do not hear from us within 7 days, please contact the clinic through [employer] or phone. If you have a critical or abnormal lab result, we will notify you by phone as soon as possible.  Submit refill requests through [employer] or call your pharmacy and they will forward the refill request to us. Please allow 3 business days for your refill to be completed.          Additional Information About Your Visit        N4MDhart Information     [employer] lets you send messages to your doctor, view your test results, renew your prescriptions, schedule appointments and more. To sign up, go to [email or web address] . Click on \"Log in\" on the left side of the screen, which will take you to the Welcome page. Then click on \"Sign up Now\" on the right side of the page.     You will be asked to enter the access " code listed below, as well as some personal information. Please follow the directions to create your username and password.     Your access code is: [email or web address]  Expires: 7/3/2018  3:40 PM     Your access code will  in 90 days. If you need help or a new code, please call your [facility] clinic or [phone].        Care EveryWhere ID     This is your Care EveryWhere ID. This could be used by other organizations to access your [facility] medical records  [id number]         Blood Pressure from Last 3 Encounters:   18 103/83   18 137/69    Weight from Last 3 Encounters:   18 104.3 kg (230 lb)   18 98.9 kg (218 lb)   18 101.1 kg (222 lb 14.2 oz)              We Performed the Following     MANUAL THER TECH     THERAPEUTIC EXERCISES        Primary Care Provider Office Phone # Fax #    [facility] [phone] [address]  [place] [address]        Equal Access to Services     [facility]: Hadii aad ku hadasho Soomaali, waaxda luqadaha, qaybta kaalmada aderoslyn, jasbir artis . So [facility] [phone].    ATENCIÓN: Si habla español, tiene a guaman disposición servicios gratuitos de asistencia lingüística. [facility] [phone].    We comply with applicable federal civil rights laws and [place] laws. We do not discriminate on the basis of race, color, national origin, age, disability, sex, sexual orientation, or gender identity.            Thank you!     Thank you for choosing [facility]  for your care. Our goal is always to provide you with excellent care. Hearing back from our patients is one way we can continue to improve our services. Please take a few minutes to complete the written survey that you may receive in the mail after your visit with us. Thank you!             Your Updated Medication List - Protect others around you: Learn how to safely use, store  and throw away your medicines at www.disposemymeds.org.          This list is accurate as of 4/30/18  2:24 PM.  Always use your most recent med list.                   Brand Name Dispense Instructions for use Diagnosis    aspirin - buffered 325 MG Tabs tablet    ASCRIPTIN    28 each    Take 1 tablet (325 mg) by mouth daily    History of total right knee replacement       HYDROcodone-acetaminophen 5-325 MG per tablet    NORCO    10 tablet    Take 1-2 tablets by mouth every 4 hours as needed for moderate to severe pain        lisinopril-hydrochlorothiazide 20-25 MG per tablet    PRINZIDE/ZESTORETIC          pravastatin 10 MG tablet    PRAVACHOL     TAKE ONE TABLET BY MOUTH AT BEDTIME

## 2018-04-30 NOTE — PROGRESS NOTES
Subjective:  HPI  Oswestry Score: 20 %                 Objective:  System    Physical Exam    General     ROS    Assessment/Plan:    PROGRESS  REPORT    Progress reporting period is from 3/19/18 to 4/30/10.      SUBJECTIVE  Subjective changes noted by patient:     Pt has been seen for 10 PT sessions.  Pt reports ache in central low back, recently had MD visit with x-ray indicating fusion is solid. Decreased muscle tension in mid back and neck.   Pt will see OT tomorrow to start concussion therapy.    Current Pain level: 2/10    Previous pain level was:  4/10     Changes in function:  Yes (See Goal flowsheet attached for changes in current functional level) Changes in function: Yes, see goal flow sheet for change in function   Adverse reaction to treatment or activity: None     OBJECTIVE  Changes noted in objective findings:    CROM and TROM WFL, except for mod loss B cv sidebending and min loss R ROT.   Decreased muscle tension in upper back, no longer with nodule at L rhomboid.   B hip flexor/quad tightness L>L.   Oswestry 20% (Previously 24.44%) Anton has improved from 6 to 3 signifying low risk. NDI 22%.

## 2018-05-01 NOTE — PROGRESS NOTES
Subjective:  HPI  Oswestry Score: 20 %                 Objective:  System    Physical Exam    General     ROS    Assessment/Plan:    ASSESSMENT/PLAN  Updated problem list and treatment plan: Diagnosis 1:  Cervical/low back pain   Pain -  manual therapy, self management, education and home program  Decreased ROM/flexibility - manual therapy, therapeutic exercise, therapeutic activity and home program  Decreased strength - therapeutic exercise, therapeutic activities and home program  Decreased function - therapeutic activities and home program  STG/LTGs have been met:  Yes (See Goal flow sheet completed today.)  Progress toward STG/LTGs have been made:  Yes (See Goal flow sheet completed today.)  Assessment of Progress: The patient's condition is improving.  Patient is meeting short term goals and is progressing towards long term goals.  Self Management Plans:  Patient has been instructed in a home treatment program.  Patient  has been instructed in self management of symptoms.  I have re-evaluated this patient and find that the nature, scope, duration and intensity of the therapy is appropriate for the medical condition of the patient.  Kareem continues to require the following intervention to meet STG and LT's:  PT    Recommendations:  This patient would benefit from continued therapy.     Frequency:  1 X week, once daily  Duration:  for 4 weeks      The progress note/discharge summary was written in collaboration with and reviewed by the physical therapist.    Please refer to the daily flowsheet for treatment today, total treatment time and time spent performing 1:1 timed codes.

## 2018-09-10 PROBLEM — M54.2 CERVICALGIA: Status: RESOLVED | Noted: 2018-03-19 | Resolved: 2018-09-10

## 2018-09-10 PROBLEM — M54.50 ACUTE BILATERAL LOW BACK PAIN WITHOUT SCIATICA: Status: RESOLVED | Noted: 2018-03-19 | Resolved: 2018-09-10

## 2019-02-14 ENCOUNTER — THERAPY VISIT (OUTPATIENT)
Dept: PHYSICAL THERAPY | Facility: CLINIC | Age: 64
End: 2019-02-14
Payer: COMMERCIAL

## 2019-02-14 DIAGNOSIS — G89.11 ACUTE PAIN OF LEFT SHOULDER DUE TO TRAUMA: ICD-10-CM

## 2019-02-14 DIAGNOSIS — M25.512 ACUTE PAIN OF LEFT SHOULDER DUE TO TRAUMA: ICD-10-CM

## 2019-02-14 PROCEDURE — 97110 THERAPEUTIC EXERCISES: CPT | Mod: GP | Performed by: PHYSICAL THERAPIST

## 2019-02-14 PROCEDURE — 97161 PT EVAL LOW COMPLEX 20 MIN: CPT | Mod: GP | Performed by: PHYSICAL THERAPIST

## 2019-02-14 NOTE — PROGRESS NOTES
[facility] Initial Evaluation  Subjective:    Kareem Bai is a 63 year old male with a left shoulder condition.      This is a new condition  2/4/19 patient fell 3 times in a 24 hour period on ice.  The third time had salt bag in left hand and slipped on the ice and landed on left side, doesn't remember how exactly fell.  Had immediate pain in L shoulder and low back..    Patient reports pain:  In the joint, posterior, anterior and lateral.  Radiates to:  Upper arm.  Pain is described as aching, sharp and burning (now mostly achy) and is constant and reported as 7/10 (constant 3-7/10).  Associated symptoms:  Loss of strength and loss of motion/stiffness. Pain is worse during the day and worse during the night.  Symptoms are exacerbated by lying on extremity, using arm overhead and using arm at shoulder level (awaking due to pain at night, rolling over, reaching overhead/out to the side. modifying dressing) and relieved by NSAID's.  Since onset symptoms are gradually improving.  Special tests:  X-ray (no fractures or dislocation).      General health as reported by patient is fair.  Pertinent medical history includes:  High blood pressure (neuropathy in feet so T/N, R hand dominant).  Medical allergies: no.  Other surgeries include:  Orthopedic surgery (spine, knee).  Current medications:  High blood pressure medication and muscle relaxants.  Current occupation is Retired  .        Barriers include:  None as reported by the patient.    Red flags:  Pain at night/rest.      Kareem Bai is a 63 year old male with a lumbar condition.      This is a new condition  2/4/19 fell 3 times in 24 hours on the ice and the third fall provoked tightness into the low back.  Has had lumbar fusion 2011..    Patient reports pain:  Lumbar spine right and lumbar spine left (tightness location R>L).    Quality: muscle tightness. and is constant and reported as 2/10 (1-2/10 discomfort).  Associated symptoms:   Loss of motion/stiffness. Pain is worse during the day.  Exacerbated by: unknown, is not being physical. Relieved by: hasn't tried.  Since onset symptoms are unchanged.                                                      Objective:  Standing Alignment:      Shoulder/UE:  Rounded shoulders                                       Shoulder Evaluation:  ROM:  AROM:  : Painful arc with AAROM flexion wall slide.  Flexion:  Left:  48 +p    Right:  140    Abduction:  Left: 35 +p   Right:  150      External Rotation:  Left:  55    Right:  80            Extension/Internal Rotation:  Left:  T7 just tightness (always has been better with ROM)    Right:  T9    PROM:    Flexion:  Left:  150          Abduction:  Left:  170        Internal Rotation:  Left:  55      External Rotation:  Left:  95                        Strength:            Internal Rotation:  Left:4/5     Pain:    Right: 5/5     Pain:  External Rotation:   Left:3+/5   Weak/pain free    Pain:   Right:5/5     Pain:        Elbow Flexion:  Left:5/5     Pain:    Right:5/5     Pain:  Elbow Extension:  Left:4+/5   Weak/pain free    Pain:    Right:5/5     Pain:    Special Tests:      Left shoulder negative for the following special tests:  Rotator cuff tear (Able to control drop arm test, although painful)                                         General     ROS    Assessment/Plan:    Patient is a 63 year old male with left side shoulder complaints after fall on ice 2/4/19.  Presents consistent with rotator cuff strain, significant inflammation vs potential tear due to significant weakness and AROM but excellent PROM.    Patient has the following significant findings with corresponding treatment plan.                Diagnosis 1:  L shoulder pain after fall  Pain -  hot/cold therapy, manual therapy, self management, education and home program  Decreased ROM/flexibility - manual therapy, therapeutic exercise and home program  Decreased strength - therapeutic exercise,  therapeutic activities and home program  Decreased function - therapeutic activities and home program  Impaired posture - neuro re-education and home program    Therapy Evaluation Codes:     Low complexity using standardized patient assessment instrument and/or measureable assessment of functional outcome.  Cumulative Therapy Evaluation is: Low complexity.    Previous and current functional limitations:  (See Goal Flow Sheet for this information)    Short term and Long term goals: (See Goal Flow Sheet for this information)     Communication ability:  Patient appears to be able to clearly communicate and understand verbal and written communication and follow directions correctly.  Treatment Explanation - The following has been discussed with the patient:   RX ordered/plan of care  Anticipated outcomes  Possible risks and side effects  This patient would benefit from PT intervention to resume normal activities.   Rehab potential is good.    Frequency:  1 X week, once daily  Duration:  for 8 weeks  Discharge Plan:  Achieve all LTG.  Independent in home treatment program.  Reach maximal therapeutic benefit.    Please refer to the daily flowsheet for treatment today, total treatment time and time spent performing 1:1 timed codes.

## 2019-02-14 NOTE — LETTER
[facility] PHYSICAL THERAPY  [address]  [facility] [address]  [phone]    February 15, 2019    Re: Kareem Bai   :   1955  MRN:  [id number]   REFERRING PHYSICIAN:   Ana Rodrigues    [facility]    Date of Initial Evaluation:  2019  Visits:  Rxs Used: 1  Reason for Referral:  Acute pain of left shoulder due to trauma    EVALUATION SUMMARY    Subjective:  Kareem Bai is a 63 year old male with a left shoulder condition.  This is a new condition  19 patient fell 3 times in a 24 hour period on ice.  The third time had salt bag in left hand and slipped on the ice and landed on left side, doesn't remember how exactly fell.  Had immediate pain in L shoulder and low back..    Patient reports pain:  In the joint, posterior, anterior and lateral.  Radiates to:  Upper arm.  Pain is described as aching, sharp and burning (now mostly achy) and is constant and reported as 7/10 (constant 3-7/10).  Associated symptoms:  Loss of strength and loss of motion/stiffness. Pain is worse during the day and worse during the night.  Symptoms are exacerbated by lying on extremity, using arm overhead and using arm at shoulder level (awaking due to pain at night, rolling over, reaching overhead/out to the side. modifying dressing) and relieved by NSAID's.  Since onset symptoms are gradually improving.  Special tests:  X-ray (no fractures or dislocation). General health as reported by patient is fair.  Pertinent medical history includes:  High blood pressure (neuropathy in feet so T/N, R hand dominant).  Medical allergies: no.  Other surgeries include:  Orthopedic surgery (spine, knee).  Current medications:  High blood pressure medication and muscle relaxants.  Current occupation is Retired.      Barriers include:  None as reported by the patient.  Red flags:  Pain at  night/rest.  Kareem Bai is a 63 year old male with a lumbar condition. This is a new condition  19 fell 3 times in 24 hours on the ice and the third fall provoked tightness into the low back.  Has had lumbar fusion ..    Patient reports pain:  Lumbar spine right and lumbar spine left (tightness location R>L).   Quality: muscle tightness. and is constant and reported as 2/10 (1-2/10 discomfort).  Associated symptoms:  Loss of motion/stiffness. Pain is worse during the day.  Exacerbated by: unknown, is not being physical. Relieved by: hasn't tried.  Since onset symptoms are unchanged.                                     Objective:  Standing Alignment:   Shoulder/UE:  Rounded shoulders    Shoulder Evaluation:  Re: Kareem Bai   :   1955    ROM:  AROM:  : Painful arc with AAROM flexion wall slide.  Flexion:  Left:  48 +p    Right:  140  Abduction:  Left: 35 +p   Right:  150  External Rotation:  Left:  55    Right:  80  Extension/Internal Rotation:  Left:  T7 just tightness (always has been better with ROM)  Right:  T9    PROM:    Flexion:  Left:  150    Abduction:  Left:  170      Internal Rotation:  Left:  55      External Rotation:  Left:  95      Strength:    Internal Rotation:  Left:4/5     Pain:    Right: 5/5     Pain:  External Rotation:   Left:3+/5   Weak/pain free    Pain:   Right:5/5     Pain:    Elbow Flexion:  Left:5/5     Pain:    Right:5/5     Pain:  Elbow Extension:  Left:4+/5   Weak/pain free    Pain:    Right:5/5     Pain:  Special Tests:    Left shoulder negative for the following special tests:  Rotator cuff tear (Able to control drop arm test, although painful)    Assessment/Plan:    Patient is a 63 year old male with left side shoulder complaints after fall on ice 19.  Presents consistent with rotator cuff strain, significant inflammation vs potential tear due to significant weakness and AROM but excellent PROM.    Patient has the following significant findings with  corresponding treatment plan.                Diagnosis 1:  L shoulder pain after fall  Pain -  hot/cold therapy, manual therapy, self management, education and home program  Decreased ROM/flexibility - manual therapy, therapeutic exercise and home program  Decreased strength - therapeutic exercise, therapeutic activities and home program  Decreased function - therapeutic activities and home program  Impaired posture - neuro re-education and home program    Therapy Evaluation Codes:   Low complexity using standardized patient assessment instrument and/or measureable assessment of functional outcome.  Cumulative Therapy Evaluation is: Low complexity.    Previous and current functional limitations:  (See Goal Flow Sheet for this information)    Short term and Long term goals: (See Goal Flow Sheet for this information)     Communication ability:  Patient appears to be able to clearly communicate and understand verbal and written communication and follow directions correctly.  Treatment Explanation - The following has been discussed with the patient:   RX ordered/plan of care  Anticipated outcomes  Possible risks and side effects  This patient would benefit from PT intervention to resume normal activities.   Rehab potential is good.    Re: Kareem Bai   :   1955    Frequency:  1 X week, once daily  Duration:  for 8 weeks  Discharge Plan:  Achieve all LTG.  Independent in home treatment program.  Reach maximal therapeutic benefit.    Thank you for your referral.    INQUIRIES  Therapist: Julianne Chen DPT   [facility]  [address] [address]  Phone: [phone]  Fax: [phone]

## 2019-02-27 ENCOUNTER — MEDICAL CORRESPONDENCE (OUTPATIENT)
Dept: HEALTH INFORMATION MANAGEMENT | Facility: CLINIC | Age: 64
End: 2019-02-27

## 2019-02-28 ENCOUNTER — THERAPY VISIT (OUTPATIENT)
Dept: PHYSICAL THERAPY | Facility: CLINIC | Age: 64
End: 2019-02-28
Payer: COMMERCIAL

## 2019-02-28 DIAGNOSIS — G89.11 ACUTE PAIN OF LEFT SHOULDER DUE TO TRAUMA: ICD-10-CM

## 2019-02-28 DIAGNOSIS — M25.512 ACUTE PAIN OF LEFT SHOULDER DUE TO TRAUMA: ICD-10-CM

## 2019-02-28 PROCEDURE — 97110 THERAPEUTIC EXERCISES: CPT | Mod: GP | Performed by: PHYSICAL THERAPIST

## 2019-02-28 PROCEDURE — 97140 MANUAL THERAPY 1/> REGIONS: CPT | Mod: GP | Performed by: PHYSICAL THERAPIST

## 2019-02-28 PROCEDURE — 97010 HOT OR COLD PACKS THERAPY: CPT | Mod: GP | Performed by: PHYSICAL THERAPIST

## 2019-03-04 ENCOUNTER — DOCUMENTATION ONLY (OUTPATIENT)
Dept: CARE COORDINATION | Facility: CLINIC | Age: 64
End: 2019-03-04

## 2019-03-15 ENCOUNTER — ANCILLARY PROCEDURE (OUTPATIENT)
Dept: CT IMAGING | Facility: CLINIC | Age: 64
End: 2019-03-15
Attending: PSYCHIATRY & NEUROLOGY
Payer: COMMERCIAL

## 2019-03-15 ENCOUNTER — OFFICE VISIT (OUTPATIENT)
Dept: NEUROLOGY | Facility: CLINIC | Age: 64
End: 2019-03-15
Payer: COMMERCIAL

## 2019-03-15 VITALS — DIASTOLIC BLOOD PRESSURE: 102 MMHG | OXYGEN SATURATION: 94 % | SYSTOLIC BLOOD PRESSURE: 154 MMHG | HEART RATE: 73 BPM

## 2019-03-15 DIAGNOSIS — M54.16 LUMBAR RADICULOPATHY: ICD-10-CM

## 2019-03-15 DIAGNOSIS — G62.9 NEUROPATHY: ICD-10-CM

## 2019-03-15 DIAGNOSIS — G62.9 NEUROPATHY: Primary | ICD-10-CM

## 2019-03-15 LAB
GLUCOSE 2H P 75 G GLC PO SERPL-MCNC: ABNORMAL MG/DL (ref 60–200)
GLUCOSE BLDC GLUCOMTR-MCNC: 168 MG/DL (ref 70–99)
GLUCOSE PRE 75 G GLC PO SERPL-MCNC: 153 MG/DL (ref 60–126)
TSH SERPL DL<=0.005 MIU/L-ACNC: 2.42 MU/L (ref 0.4–4)

## 2019-03-15 ASSESSMENT — PAIN SCALES - GENERAL: PAINLEVEL: NO PAIN (1)

## 2019-03-15 NOTE — PROGRESS NOTES
Service Date: 03/15/2019      REASON FOR VISIT:  This patient is a 63-year-old right-handed man seen at the request of Dr. Rodrigues for neurologic consultation with complaint of neuropathy.        HISTORY OF PRESENT ILLNESS:  The patient reports that he has neuropathy in his feet.  The first symptom was a tingling and that began in 2003.  Both feet then became numb.  This symptom is on the bottoms and tops of his feet.  He reports that his father had similar symptom and walked with a foot slapping gait when he was younger than the patient is now.  The patient himself has no particular problems with his walking, although he did have lumbar fusion surgery in 2011 for neurogenic claudication.  The symptoms then were that he would have numbness in his feet radiating up into his buttocks after walking 2 blocks.  This symptom is now coming back in that his buttocks will cramp when he walks any distance more than a block or two.  He is not as agile as he used to be.  His feet are numb and heavy.  His legs feel heavy.  He has no problem walking in the dark.  He has no symptoms in his hands.  In his feet, he does not have pain or pins or needles, but more a feeling that the skin is tight and stiff.  He does try and exercise.  He had right knee replacement a year ago and has been trying to condition with that.  He has no problem with bowel or bladder control.  He does not have problems with vision, hearing, speech or swallowing.  He does not have dry mouth or dry eyes.      PAST MEDICAL HISTORY:   Significant for high blood pressure.  He does not have diabetes.  He does have elevated cholesterol.  He does not have thyroid or asthma.  He has had lumbar spine fusion as noted.  He injured his left shoulder in February when he slipped on the ice.  He has a couple of drinks of alcohol daily.  He does not smoke.      SOCIAL HISTORY:  He is  with 2 children.  He is retired from the post [employer].        FAMILY HISTORY:    Significant for his father with some kind of neuropathy.      PHYSICAL EXAMINATION:   GENERAL:   The patient is cooperative and in no distress.   VITAL SIGNS:  His blood pressure is somewhat elevated 148/105.     There are no carotid bruits.  Auscultation of the heart shows S1 and S2.   NEUROLOGIC:  The patient is alert, oriented and lucid.  Cranial nerve testing shows full visual fields to confrontation.  Funduscopic exam shows sharp disks bilaterally.  Eye movements are complete and conjugate without nystagmus.  Pupils react to light.  Facial sensation is normal.  Face moves symmetrically.  Palate elevates in the midline.  Tongue protrudes in the midline.  Motor evaluation shows no pronator drift, normal finger, finger-nose-finger and heel-knee-shin.  The patient has reduced muscle bulk in the feet, especially in EDB.  There is no hair growing on the feet.  Strength is well preserved in the arms, hands, legs and feet.  Muscle stretch reflexes are reduced-trace at biceps and triceps, absent at brachioradialis, reduced at the knees, absent at the ankles.  Toes are downgoing.  Sensory exam shows reduced pinprick sensation distally in the feet with preserved temperature sense and vibration.  Romberg sign is absent.  He can get up on his heels and toes, though seems a little clumsy.  His tandem gait is clumsy.      He did have a CT scan of the lumbar spine performed in 03/2018.  This was several years after his fusion surgery.  It did show postoperative changes at L4-L5.  There were degenerative changes and moderate central canal stenosis and bilateral foraminal stenosis at L3-L4.  There was also spondylolisthesis at multiple levels.  He has had some lab work performed as well.  A vitamin D level in February was low at 15.  A vitamin B12 level was borderline at 289.      ASSESSMENT:     1.  Sensory disturbance in the feet, neuropathy.   2.  History of lumbar spine fusion for neurogenic claudication.      DISCUSSION:   The patient is seen with the above problem list.  His presentation suggests neuropathy.  It sounds like his father may have had neuropathy as well, possibly with bilateral foot drop.  In any case, I am going to check labs for neuropathy including a glucose tolerance test, methylmalonic acid, TSH, protein electrophoresis, and immune fixation.  Electrodiagnostic testing will be obtained.  Depending on results, he may need to have further workup.  If he does have neuropathy that looks inherited then he may need an [facility] Garcia.  In addition, he is developing recurring symptoms of neurogenic claudication.  I am going to repeat the CT scan of the lumbar spine and have it compared to the study from 2018.  I will be communicating the results of this workup to the patient and followup will be arranged thereafter.      Miquel Florez MD      cc:   [place] [address]         MIQUEL FLOREZ MD             D: 03/15/2019   T: 03/15/2019   MT: AKA      Name:     REGINALD KELLY   MRN:      [id number]        Account:      [id number]   :      1955           Service Date: 03/15/2019      Document: [id number]        3/15 addendum: reviewed labs with pt.  Fasting glucose elevated at 153.  Recommend he discuss with primary care glucose intolerance. TSH normal.      3/16 addendum: reviewed CT lumbar with pt.

## 2019-03-15 NOTE — LETTER
3/15/2019       RE: Kareem Bai  [address]  [facility] [address]     Dear Colleague,    Thank you for referring your patient, Kareem Bai, to the [facility] NEUROLOGY at [facility]. Please see a copy of my visit note below.    Service Date: 03/15/2019      REASON FOR VISIT:  This patient is a 63-year-old right-handed man seen at the request of Dr. Rodrigues for neurologic consultation with complaint of neuropathy.        HISTORY OF PRESENT ILLNESS:  The patient reports that he has neuropathy in his feet.  The first symptom was a tingling and that began in 2003.  Both feet then became numb.  This symptom is on the bottoms and tops of his feet.  He reports that his father had similar symptom and walked with a foot slapping gait when he was younger than the patient is now.  The patient himself has no particular problems with his walking, although he did have lumbar fusion surgery in 2011 for neurogenic claudication.  The symptoms then were that he would have numbness in his feet radiating up into his buttocks after walking 2 blocks.  This symptom is now coming back in that his buttocks will cramp when he walks any distance more than a block or two.  He is not as agile as he used to be.  His feet are numb and heavy.  His legs feel heavy.  He has no problem walking in the dark.  He has no symptoms in his hands.  In his feet, he does not have pain or pins or needles, but more a feeling that the skin is tight and stiff.  He does try and exercise.  He had right knee replacement a year ago and has been trying to condition with that.  He has no problem with bowel or bladder control.  He does not have problems with vision, hearing, speech or swallowing.  He does not have dry mouth or dry eyes.      PAST MEDICAL HISTORY:   Significant for high blood pressure.  He does not have diabetes.  He does have elevated cholesterol.  He does not have thyroid or asthma.  He  has had lumbar spine fusion as noted.  He injured his left shoulder in February when he slipped on the ice.  He has a couple of drinks of alcohol daily.  He does not smoke.      SOCIAL HISTORY:  He is  with 2 children.  He is retired from the post office.        FAMILY HISTORY:   Significant for his father with some kind of neuropathy.      PHYSICAL EXAMINATION:   GENERAL:   The patient is cooperative and in no distress.   VITAL SIGNS:  His blood pressure is somewhat elevated 148/105.     There are no carotid bruits.  Auscultation of the heart shows S1 and S2.   NEUROLOGIC:  The patient is alert, oriented and lucid.  Cranial nerve testing shows full visual fields to confrontation.  Funduscopic exam shows sharp disks bilaterally.  Eye movements are complete and conjugate without nystagmus.  Pupils react to light.  Facial sensation is normal.  Face moves symmetrically.  Palate elevates in the midline.  Tongue protrudes in the midline.  Motor evaluation shows no pronator drift, normal finger, finger-nose-finger and heel-knee-shin.  The patient has reduced muscle bulk in the feet, especially in EDB.  There is no hair growing on the feet.  Strength is well preserved in the arms, hands, legs and feet.  Muscle stretch reflexes are reduced-trace at biceps and triceps, absent at brachioradialis, reduced at the knees, absent at the ankles.  Toes are downgoing.  Sensory exam shows reduced pinprick sensation distally in the feet with preserved temperature sense and vibration.  Romberg sign is absent.  He can get up on his heels and toes, though seems a little clumsy.  His tandem gait is clumsy.      He did have a CT scan of the lumbar spine performed in 03/2018.  This was several years after his fusion surgery.  It did show postoperative changes at L4-L5.  There were degenerative changes and moderate central canal stenosis and bilateral foraminal stenosis at L3-L4.  There was also spondylolisthesis at multiple levels.   He has had some lab work performed as well.  A vitamin D level in February was low at 15.  A vitamin B12 level was borderline at 289.      ASSESSMENT:     1.  Sensory disturbance in the feet, neuropathy.   2.  History of lumbar spine fusion for neurogenic claudication.      DISCUSSION:  The patient is seen with the above problem list.  His presentation suggests neuropathy.  It sounds like his father may have had neuropathy as well, possibly with bilateral foot drop.  In any case, I am going to check labs for neuropathy including a glucose tolerance test, methylmalonic acid, TSH, protein electrophoresis, and immune fixation.  Electrodiagnostic testing will be obtained.  Depending on results, he may need to have further workup.  If he does have neuropathy that looks inherited then he may need an appointment [facility] with Dr. Garcia.  In addition, he is developing recurring symptoms of neurogenic claudication.  I am going to repeat the CT scan of the lumbar spine and have it compared to the study from 2018.  I will be communicating the results of this workup to the patient and followup will be arranged thereafter.      Froylan Solorzano MD      cc:   [place] [address]         D: 03/15/2019   T: 03/15/2019   MT: AKA      Name:     REGINALD KELLY   MRN:      [id number]        Account:      [id number]   :      1955           Service Date: 03/15/2019      Document: [id number]      3/15 addendum: reviewed labs with pt.  Fasting glucose elevated at 153.  Recommend he discuss with primary care glucose intolerance. TSH normal.      Service Date: 03/15/2019      REASON FOR VISIT:  This patient is a 63-year-old right-handed man seen at the request of Dr. Rodrigues for neurologic consultation with complaint of neuropathy.        HISTORY OF PRESENT ILLNESS:  The patient reports that he has neuropathy in his feet.   The first symptom was a tingling and that began in 2003.  Both feet then became numb.  This symptom is on the bottoms and tops of his feet.  He reports that his father had similar symptom and walked with a foot slapping gait when he was younger than the patient is now.  The patient himself has no particular problems with his walking, although he did have lumbar fusion surgery in 2011 for neurogenic claudication.  The symptoms then were that he would have numbness in his feet radiating up into his buttocks after walking 2 blocks.  This symptom is now coming back in that his buttocks will cramp when he walks any distance more than a block or two.  He is not as agile as he used to be.  His feet are numb and heavy.  His legs feel heavy.  He has no problem walking in the dark.  He has no symptoms in his hands.  In his feet, he does not have pain or pins or needles, but more a feeling that the skin is tight and stiff.  He does try and exercise.  He had right knee replacement a year ago and has been trying to condition with that.  He has no problem with bowel or bladder control.  He does not have problems with vision, hearing, speech or swallowing.  He does not have dry mouth or dry eyes.      PAST MEDICAL HISTORY:   Significant for high blood pressure.  He does not have diabetes.  He does have elevated cholesterol.  He does not have thyroid or asthma.  He has had lumbar spine fusion as noted.  He injured his left shoulder in February when he slipped on the ice.  He has a couple of drinks of alcohol daily.  He does not smoke.      SOCIAL HISTORY:  He is  with 2 children.  He is retired from the post office.        FAMILY HISTORY:   Significant for his father with some kind of neuropathy.      PHYSICAL EXAMINATION:   GENERAL:   The patient is cooperative and in no distress.   VITAL SIGNS:  His blood pressure is somewhat elevated 148/105.     There are no carotid bruits.  Auscultation of the heart shows S1 and S2.    NEUROLOGIC:  The patient is alert, oriented and lucid.  Cranial nerve testing shows full visual fields to confrontation.  Funduscopic exam shows sharp disks bilaterally.  Eye movements are complete and conjugate without nystagmus.  Pupils react to light.  Facial sensation is normal.  Face moves symmetrically.  Palate elevates in the midline.  Tongue protrudes in the midline.  Motor evaluation shows no pronator drift, normal finger, finger-nose-finger and heel-knee-shin.  The patient has reduced muscle bulk in the feet, especially in EDB.  There is no hair growing on the feet.  Strength is well preserved in the arms, hands, legs and feet.  Muscle stretch reflexes are reduced-trace at biceps and triceps, absent at brachioradialis, reduced at the knees, absent at the ankles.  Toes are downgoing.  Sensory exam shows reduced pinprick sensation distally in the feet with preserved temperature sense and vibration.  Romberg sign is absent.  He can get up on his heels and toes, though seems a little clumsy.  His tandem gait is clumsy.      He did have a CT scan of the lumbar spine performed in 03/2018.  This was several years after his fusion surgery.  It did show postoperative changes at L4-L5.  There were degenerative changes and moderate central canal stenosis and bilateral foraminal stenosis at L3-L4.  There was also spondylolisthesis at multiple levels.  He has had some lab work performed as well.  A vitamin D level in February was low at 15.  A vitamin B12 level was borderline at 289.      ASSESSMENT:     1.  Sensory disturbance in the feet, neuropathy.   2.  History of lumbar spine fusion for neurogenic claudication.      DISCUSSION:  The patient is seen with the above problem list.  His presentation suggests neuropathy.  It sounds like his father may have had neuropathy as well, possibly with bilateral foot drop.  In any case, I am going to check labs for neuropathy including a glucose tolerance test, methylmalonic  acid, TSH, protein electrophoresis, and immune fixation.  Electrodiagnostic testing will be obtained.  Depending on results, he may need to have further workup.  If he does have neuropathy that looks inherited then he may need an appointment [facility] with Dr. Garcia.  In addition, he is developing recurring symptoms of neurogenic claudication.  I am going to repeat the CT scan of the lumbar spine and have it compared to the study from 2018.  I will be communicating the results of this workup to the patient and followup will be arranged thereafter.      Froylan Solorzano MD      cc:   [place] [address]      D: 03/15/2019   T: 03/15/2019   MT: AKA      Name:     REGINALD KELLY   MRN:      [id number]        Account:      [id number]   :      1955           Service Date: 03/15/2019      Document: [id number]      3/15 addendum: reviewed labs with pt.  Fasting glucose elevated at 153.  Recommend he discuss with primary care glucose intolerance. TSH normal.    3/16 addendum: reviewed CT lumbar with pt.

## 2019-03-17 LAB — METHYLMALONATE SERPL-SCNC: 0.14 UMOL/L (ref 0–0.4)

## 2019-03-18 LAB
ALBUMIN SERPL ELPH-MCNC: 4 G/DL (ref 3.7–5.1)
ALPHA1 GLOB SERPL ELPH-MCNC: 0.2 G/DL (ref 0.2–0.4)
ALPHA2 GLOB SERPL ELPH-MCNC: 0.7 G/DL (ref 0.5–0.9)
B-GLOBULIN SERPL ELPH-MCNC: 0.8 G/DL (ref 0.6–1)
GAMMA GLOB SERPL ELPH-MCNC: 0.8 G/DL (ref 0.7–1.6)
IGA SERPL-MCNC: 247 MG/DL (ref 70–380)
IGG SERPL-MCNC: 777 MG/DL (ref 695–1620)
IGM SERPL-MCNC: 38 MG/DL (ref 60–265)
M PROTEIN SERPL ELPH-MCNC: 0 G/DL
PROT PATTERN SERPL ELPH-IMP: NORMAL
PROT PATTERN SERPL IFE-IMP: ABNORMAL

## 2019-04-22 ENCOUNTER — OFFICE VISIT (OUTPATIENT)
Dept: NEUROLOGY | Facility: CLINIC | Age: 64
End: 2019-04-22
Payer: COMMERCIAL

## 2019-04-22 DIAGNOSIS — G62.9 NEUROPATHY: ICD-10-CM

## 2019-04-22 PROCEDURE — 95910 NRV CNDJ TEST 7-8 STUDIES: CPT | Performed by: PSYCHIATRY & NEUROLOGY

## 2019-04-22 PROCEDURE — 95885 MUSC TST DONE W/NERV TST LIM: CPT | Performed by: PSYCHIATRY & NEUROLOGY

## 2019-04-22 NOTE — LETTER
4/22/2019         RE: Kareem Bai  [address]  [facility] [address]        Dear Colleague,    Thank you for referring your patient, Kareem Bai, to the [facility]. Please see a copy of my visit note below.    [facility]G [facility]      Nerve Conduction & EMG Report          Patient:       Kareem Bai  Patient ID:    [id number]  Gender:        Male  YOB: 1955  Age:           64 Years 0 Months      History & Examination:  Kareem Bai is a 64 year old man with sensory symptoms in the feet, foot deformities, and a family history of gait difficulty. He is referred for evaluation of possible sensorimotor polyneuropathy.     Techniques:  Motor conduction studies were done with surface recording electrodes. Sensory conduction studies were performed with surface electrodes, unless indicated otherwise by (n), designating the use of subdermal recording electrodes. Temperature was monitored and recorded throughout the study. Upper extremities were maintained at a temperature of 32 degrees Centigrade or higher. Lower extremities were maintained at a temperature of 31 degrees Centigrade or higher. Electromyography was performed with a concentric needle electrode.    Results:   Bilateral sural and left radial sensory conduction studies were normal. Bilateral peroneal and left ulnar motor conduction studies were normal. Tibial compound muscle action potential amplitudes were markedly attenuated on the left and absent on the right. A left peroneal F-response study demonstrated a single F-response of normal latency. Electromyography of muscles of the left calf demonstrated equivocal reduction in recruitment in the left gastrocnemius and was otherwise normal.     Interpretation:   This is a mildly abnormal study, demonstrating electrophysiologic evidence of a pure motor, likely neurogenic length-dependent process affecting the distal  tibial distributions.      Joaquin Garcia MD          Sensory NCS      Nerve / Sites Rec. Site Onset Peak NP Amp Ref. PP Amp Dist Butch Ref. Temp     ms ms  V  V  V cm m/s m/s  C   L RADIAL - Snuff      Forearm Snuff 2.19 2.97 16.3 15.0 18.1 12.5 57.1 48.0 32.6   L SURAL - Lat Mall 60      Calf Ankle 3.18 4.53 6.1 5.0 5.0 14 44.1  31.1   R SURAL - Lat Mall 60      Calf Ankle 3.07 4.48 5.1 5.0 4.1 14 45.6 38.0 33       Motor NCS      Nerve / Sites Rec. Site Lat Ref. Amp Ref. Rel Amp Dist Butch Ref. Dur. Area Temp.     ms ms mV mV % cm m/s m/s ms %  C   L ULNAR - ADM      Wrist ADM 2.92 3.50 9.0 5.0 100 8   7.24 100 33.1      B.Elbow ADM 7.24  9.2  102 21.5 49.7 48.0 6.82 103 33   L DEEP PERONEAL - EDB 60      Ankle EDB 5.00 6.00 3.1 2.0 100 8   6.93 100 31.2      FibHead EDB 12.71  2.6  83.6 30 38.9 38.0 8.91 101 31      Pop Fos EDB 14.79  2.5  81.5 8 38.4 38.0 9.27 96.9 31   R DEEP PERONEAL - EDB 60      Ankle EDB 5.26 6.00 2.2 2.0 100 8   5.73 100 32.1      FibHead EDB 12.92  2.1  95.1 30.5 39.8 38.0 7.40 87.1 32.1   L TIBIAL - AH      Ankle AH 4.32 6.00 0.5 4.0 100 8   6.46 100 31      Pop Fos AH 13.70  0.3  59.5 44 46.9 38.0 12.03  31   R TIBIAL - AH      Ankle AH NR 6.00 NR 4.0 NR 8   NR NR 31       F  Wave      Nerve Min F Lat Max F Lat Mean FLat Temp.    ms ms ms  C   L DEEP PERONEAL 43.49 43.49 43.49 31.2       EMG Summary Table     Spontaneous MUAP Recruitment    IA Fib PSW Fasc H.F. Amp Dur. PPP Pattern   L. TIB ANTERIOR N None None None None N N N N   L. GASTROCN (MED) N None None None None N N N Equivocal reduction                              Again, thank you for allowing me to participate in the care of your patient.        Sincerely,        Joaquin Garcia MD

## 2019-04-23 DIAGNOSIS — G62.9 NEUROPATHY: Primary | ICD-10-CM

## 2019-04-23 NOTE — PROGRESS NOTES
[facility] EMG [facility]      Nerve Conduction & EMG Report          Patient:       Kareem Bai  Patient ID:    [id number]  Gender:        Male  YOB: 1955  Age:           64 Years 0 Months      History & Examination:  Kareem Bai is a 64 year old man with sensory symptoms in the feet, foot deformities, and a family history of gait difficulty. He is referred for evaluation of possible sensorimotor polyneuropathy.     Techniques:  Motor conduction studies were done with surface recording electrodes. Sensory conduction studies were performed with surface electrodes, unless indicated otherwise by (n), designating the use of subdermal recording electrodes. Temperature was monitored and recorded throughout the study. Upper extremities were maintained at a temperature of 32 degrees Centigrade or higher. Lower extremities were maintained at a temperature of 31 degrees Centigrade or higher. Electromyography was performed with a concentric needle electrode.    Results:   Bilateral sural and left radial sensory conduction studies were normal. Bilateral peroneal and left ulnar motor conduction studies were normal. Tibial compound muscle action potential amplitudes were markedly attenuated on the left and absent on the right. A left peroneal F-response study demonstrated a single F-response of normal latency. Electromyography of muscles of the left calf demonstrated equivocal reduction in recruitment in the left gastrocnemius and was otherwise normal.     Interpretation:   This is a mildly abnormal study, demonstrating electrophysiologic evidence of a pure motor, likely neurogenic length-dependent process affecting the distal tibial distributions.      Joaquin Garcia MD          Sensory NCS      Nerve / Sites Rec. Site Onset Peak NP Amp Ref. PP Amp Dist Butch Ref. Temp     ms ms  V  V  V cm m/s m/s  C   L RADIAL - Snuff      Forearm Snuff 2.19 2.97 16.3 15.0 18.1 12.5 57.1 48.0 32.6    L SURAL - Lat Mall 60      Calf Ankle 3.18 4.53 6.1 5.0 5.0 14 44.1  31.1   R SURAL - Lat Mall 60      Calf Ankle 3.07 4.48 5.1 5.0 4.1 14 45.6 38.0 33       Motor NCS      Nerve / Sites Rec. Site Lat Ref. Amp Ref. Rel Amp Dist Butch Ref. Dur. Area Temp.     ms ms mV mV % cm m/s m/s ms %  C   L ULNAR - ADM      Wrist ADM 2.92 3.50 9.0 5.0 100 8   7.24 100 33.1      B.Elbow ADM 7.24  9.2  102 21.5 49.7 48.0 6.82 103 33   L DEEP PERONEAL - EDB 60      Ankle EDB 5.00 6.00 3.1 2.0 100 8   6.93 100 31.2      FibHead EDB 12.71  2.6  83.6 30 38.9 38.0 8.91 101 31      Pop Fos EDB 14.79  2.5  81.5 8 38.4 38.0 9.27 96.9 31   R DEEP PERONEAL - EDB 60      Ankle EDB 5.26 6.00 2.2 2.0 100 8   5.73 100 32.1      FibHead EDB 12.92  2.1  95.1 30.5 39.8 38.0 7.40 87.1 32.1   L TIBIAL - AH      Ankle AH 4.32 6.00 0.5 4.0 100 8   6.46 100 31      Pop Fos AH 13.70  0.3  59.5 44 46.9 38.0 12.03  31   R TIBIAL - AH      Ankle AH NR 6.00 NR 4.0 NR 8   NR NR 31       F  Wave      Nerve Min F Lat Max F Lat Mean FLat Temp.    ms ms ms  C   L DEEP PERONEAL 43.49 43.49 43.49 31.2       EMG Summary Table     Spontaneous MUAP Recruitment    IA Fib PSW Fasc H.F. Amp Dur. PPP Pattern   L. TIB ANTERIOR N None None None None N N N N   L. GASTROCN (MED) N None None None None N N N Equivocal reduction

## 2019-04-23 NOTE — PROGRESS NOTES
Reviewed EMG report with pt.  No compelling evidence for neuropathy.  Low amplitude tibial motor responses could relate to previous problems with neurogenic claudication and lumbar fusion in 2011.  He has not yet discussed elevated glucose with primary care, and I told him to do that.  He would like referral to neuromuscular for further evaluation of neuropathy.

## 2019-05-20 PROBLEM — M25.512 ACUTE PAIN OF LEFT SHOULDER DUE TO TRAUMA: Status: RESOLVED | Noted: 2019-02-14 | Resolved: 2019-05-20

## 2019-05-20 PROBLEM — G89.11 ACUTE PAIN OF LEFT SHOULDER DUE TO TRAUMA: Status: RESOLVED | Noted: 2019-02-14 | Resolved: 2019-05-20

## 2019-05-20 NOTE — PROGRESS NOTES
Discharge Note    Progress reporting period is from initial evaluation date (please see noted date below) to Feb 28, 2019.  Linked Episodes   Type: Episode: Status: Noted: Resolved: Last update: Updated by:   PHYSICAL THERAPY L shoulder pain after fall 2/14/19 Active 2/14/2019 2/28/2019  8:54 AM Julianne Chen, PT      Comments:       Kareem failed to follow up and current status is unknown.  Please see information below for last relevant information on current status.  Patient seen for 2 visits.    SUBJECTIVE  Subjective changes noted by patient:  After first day, he reports being very sore and couldn't do any of the exercises for two days. He reports not much pain, but just doesn't feel as though he has strength. He reports being consistent with HEP since.   .  Current pain level is 3/10.     Previous pain level was  7/10(constant 3-7/10).   Changes in function:  Yes (See Goal flowsheet attached for changes in current functional level)  Adverse reaction to treatment or activity: None    OBJECTIVE  Changes noted in objective findings: AROM flexion 78, ABD 47, ER 51.     ASSESSMENT/PLAN  Diagnosis: L shoulder pain   Updated problem list and treatment plan:   Pain - HEP  Decreased ROM/flexibility - HEP  Decreased function - HEP  Decreased strength - HEP  Inflammation - HEP  Impaired posture - HEP  STG/LTGs have been met or progress has been made towards goals:  Yes, please see goal flowsheet for most current information  Assessment of Progress: current status is unknown.    Last current status:     Self Management Plans:  HEP  I have re-evaluated this patient and find that the nature, scope, duration and intensity of the therapy is appropriate for the medical condition of the patient.  Kareem continues to require the following intervention to meet STG and LTG's:  HEP.    Recommendations:  Discharge with current home program.  Patient to follow up with MD as needed.    Please refer to the daily flowsheet for  treatment today, total treatment time and time spent performing 1:1 timed codes.

## 2019-10-18 ENCOUNTER — OFFICE VISIT (OUTPATIENT)
Dept: NEUROLOGY | Facility: CLINIC | Age: 64
End: 2019-10-18
Payer: MEDICARE

## 2019-10-18 VITALS
WEIGHT: 225.6 LBS | DIASTOLIC BLOOD PRESSURE: 77 MMHG | HEIGHT: 73 IN | HEART RATE: 70 BPM | SYSTOLIC BLOOD PRESSURE: 124 MMHG | BODY MASS INDEX: 29.9 KG/M2 | OXYGEN SATURATION: 95 %

## 2019-10-18 DIAGNOSIS — R20.9 DISTURBANCE OF SKIN SENSATION: Primary | ICD-10-CM

## 2019-10-18 DIAGNOSIS — R79.9 ABNORMAL FINDING OF BLOOD CHEMISTRY, UNSPECIFIED: ICD-10-CM

## 2019-10-18 LAB — HBA1C MFR BLD: 7.2 % (ref 0–5.6)

## 2019-10-18 PROCEDURE — 99203 OFFICE O/P NEW LOW 30 MIN: CPT | Performed by: PSYCHIATRY & NEUROLOGY

## 2019-10-18 PROCEDURE — 36416 COLLJ CAPILLARY BLOOD SPEC: CPT | Performed by: PSYCHIATRY & NEUROLOGY

## 2019-10-18 PROCEDURE — 83036 HEMOGLOBIN GLYCOSYLATED A1C: CPT | Performed by: PSYCHIATRY & NEUROLOGY

## 2019-10-18 ASSESSMENT — MIFFLIN-ST. JEOR: SCORE: 1862.19

## 2019-10-18 NOTE — PROGRESS NOTES
"Consultation for 64 year old man who has experienced numbness in feet and lower limbs, progressing rostrally, for about 15 years. Numbness in hands upon awakening only. He denies weakness or falls.     PMH, medications, social and family history, and ROS are documented in Dr Solorzano's note and in today's new patient packet.     Denies sicca or history of hepatitis C.     Examination    Mental state: Alert, appropriate, speech, language, and thought content normal.     Cranial nerves II-XII normal.    Sensory examination:   Right Left   Light touch Normal Normal   Vibration (timed)     Vibration (Rydell-Seiffer) MM4 MM3   Pin PC DC   Position     Legend:   MM = medial malleolus, TT = tibial tuberosity, K = patella, MCP = MCP joint  MF = mid-foot, DC = distal calf, MC = mid calf, PC = proximal calf      Manual muscle testing (A indicates atrophy):   Right Left   Shoulder abduction  5 5   Elbow extension 5 5   Elbow flexion 5 5   Wrist extension  5 5   Finger extension 5 5   FDI 5 5   APB 5 5   Hip flexion 5 5   Knee flexion 5 5   Knee extension 5 5   Dorsiflexion 5 5   Plantar flexion 5 5     Muscle tone:   Right Left   Upper limb Normal Normal   Lower limb Normal Normal        Reflexes:   Right Left   Triceps 2 2   Biceps 2 2   Brachioradialis 2 2   Patellar 0 0   Achilles 0 0   Plantar Flexor Flexor   Clonus Absent Absent      Coordination:  Finger-nose normal.  Heel-shin normal.  RRMs normal.    Gait:  Normal. Heel, toe walking normal.     Impression:  1. Reduced pin perception in lower limbs with relative preservation of large fiber modalities.   2. Intrinsic foot muscle atrophy, possibly due to prior spinal stenosis or genetically determined.       Recommendations (from AVS):      1. You may have a \"small fiber neuropathy.\" This does not show up on the testing you have had so far. We will test for it with skin biopsies of the foot and calf.  2. I do not think this problem will substantially limit your ability to " walk. It may get gradually worse over the years but at worst affects balance modestly and does not cause weakness.  3. Some of the foot muscles are smaller than normal. This could possibly be related to the spinal stenosis or could be hereditary. I do not think we need to do anything about it unless you develop recurrent symptoms similar to those that led to your spinal fusion.   4. Blood test today to screen further for diabetes.      Joaquin Garcia M.D.

## 2019-10-18 NOTE — NURSING NOTE
"Kareem Bai's goals for this visit include:   Chief Complaint   Patient presents with     NEUROPATHY     Neuropathy, per pt, referral from Dr. Solorzano       He requests these members of his care team be copied on today's visit information: Ana Rodrigues  PCP: Ana Rodrigues    Referring Provider:  Froylan Solorzano MD  [address]  [place] [address]    /77 (BP Location: Left arm, Patient Position: Sitting, Cuff Size: Adult Regular)   Pulse 70   Ht 1.854 m (6' 1\")   Wt 102.3 kg (225 lb 9.6 oz)   SpO2 95%   BMI 29.76 kg/m      Do you need any medication refills at today's visit? No    Lexie Hines LPN    "

## 2019-10-18 NOTE — PATIENT INSTRUCTIONS
"1. You may have a \"small fiber neuropathy.\" This does not show up on the testing you have had so far. We will test for it with skin biopsies of the foot and calf.  2. I do not think this problem will substantially limit your ability to walk. It may get gradually worse over the years but at worst affects balance modestly and does not cause weakness.  3. Some of the foot muscles are smaller than normal. This could possibly be related to the spinal stenosis or could be hereditary. I do not think we need to do anything about it unless you develop recurrent symptoms similar to those that led to your spinal fusion.   4. Blood test today to screen further for diabetes.  "

## 2019-10-29 ENCOUNTER — TELEPHONE (OUTPATIENT)
Dept: NEUROLOGY | Facility: CLINIC | Age: 64
End: 2019-10-29

## 2019-10-29 ENCOUNTER — OFFICE VISIT (OUTPATIENT)
Dept: NEUROLOGY | Facility: CLINIC | Age: 64
End: 2019-10-29
Payer: MEDICARE

## 2019-10-29 VITALS
DIASTOLIC BLOOD PRESSURE: 75 MMHG | SYSTOLIC BLOOD PRESSURE: 118 MMHG | WEIGHT: 226.3 LBS | BODY MASS INDEX: 29.86 KG/M2 | OXYGEN SATURATION: 95 % | TEMPERATURE: 97.8 F

## 2019-10-29 DIAGNOSIS — R20.9 DISTURBANCE OF SKIN SENSATION: ICD-10-CM

## 2019-10-29 DIAGNOSIS — R79.9 ABNORMAL FINDING OF BLOOD CHEMISTRY, UNSPECIFIED: ICD-10-CM

## 2019-10-29 PROBLEM — E11.9 DIABETES MELLITUS, TYPE 2 (H): Status: ACTIVE | Noted: 2019-10-29

## 2019-10-29 LAB
ANION GAP SERPL CALCULATED.3IONS-SCNC: 4 MMOL/L (ref 3–14)
BUN SERPL-MCNC: 14 MG/DL (ref 7–30)
CALCIUM SERPL-MCNC: 9.6 MG/DL (ref 8.5–10.1)
CHLORIDE SERPL-SCNC: 106 MMOL/L (ref 94–109)
CO2 SERPL-SCNC: 29 MMOL/L (ref 20–32)
CREAT SERPL-MCNC: 0.79 MG/DL (ref 0.66–1.25)
GFR SERPL CREATININE-BSD FRML MDRD: >90 ML/MIN/{1.73_M2}
GLUCOSE SERPL-MCNC: 135 MG/DL (ref 70–99)
POTASSIUM SERPL-SCNC: 4.4 MMOL/L (ref 3.4–5.3)
SODIUM SERPL-SCNC: 139 MMOL/L (ref 133–144)

## 2019-10-29 PROCEDURE — 11105 PUNCH BX SKIN EA SEP/ADDL: CPT | Performed by: PSYCHIATRY & NEUROLOGY

## 2019-10-29 PROCEDURE — 36415 COLL VENOUS BLD VENIPUNCTURE: CPT | Performed by: PSYCHIATRY & NEUROLOGY

## 2019-10-29 PROCEDURE — 88356 ANALYSIS NERVE: CPT | Mod: 90 | Performed by: PSYCHIATRY & NEUROLOGY

## 2019-10-29 PROCEDURE — 88350 IMFLUOR EA ADDL 1ANTB STN PX: CPT | Mod: 90 | Performed by: PSYCHIATRY & NEUROLOGY

## 2019-10-29 PROCEDURE — 99000 SPECIMEN HANDLING OFFICE-LAB: CPT | Performed by: PSYCHIATRY & NEUROLOGY

## 2019-10-29 PROCEDURE — 80048 BASIC METABOLIC PNL TOTAL CA: CPT | Performed by: PSYCHIATRY & NEUROLOGY

## 2019-10-29 PROCEDURE — 88346 IMFLUOR 1ST 1ANTB STAIN PX: CPT | Mod: 90 | Performed by: PSYCHIATRY & NEUROLOGY

## 2019-10-29 PROCEDURE — 88314 HISTOCHEMICAL STAINS ADD-ON: CPT | Mod: 90 | Performed by: PSYCHIATRY & NEUROLOGY

## 2019-10-29 PROCEDURE — 11104 PUNCH BX SKIN SINGLE LESION: CPT | Performed by: PSYCHIATRY & NEUROLOGY

## 2019-10-29 PROCEDURE — 88348 ELECTRON MICROSCOPY DX: CPT | Mod: 90 | Performed by: PSYCHIATRY & NEUROLOGY

## 2019-10-29 PROCEDURE — 88305 TISSUE EXAM BY PATHOLOGIST: CPT | Mod: 90 | Performed by: PSYCHIATRY & NEUROLOGY

## 2019-10-29 RX ORDER — NAPROXEN 500 MG/1
500 TABLET ORAL 2 TIMES DAILY PRN
COMMUNITY
Start: 2019-09-20

## 2019-10-29 RX ORDER — GABAPENTIN 300 MG/1
CAPSULE ORAL
Refills: 11 | COMMUNITY
Start: 2019-09-18

## 2019-10-29 ASSESSMENT — PAIN SCALES - GENERAL: PAINLEVEL: NO PAIN (0)

## 2019-10-29 NOTE — PROGRESS NOTES
Procedure note: Skin biopsy. Indication: sensory disturbance, skin. After obtaining informed consent, 3 mm skin biopsies were performed over the left foot and calf. 1% lidocaine anesthesia and betadine sterile prep were used. The patient tolerated the procedure well. Wound care and patient education were provided by Evelina Martin R.N.    I also explained that his hemoglobin A1c was elevated and needs to be reviewed with PCP. He is obtaining a new PCP, possibly a Dr Elizondo at the same  clinic he has been attending. We will obtain a BMP today. He has some fatigue and frequent urination but does not feel ill.

## 2019-10-29 NOTE — NURSING NOTE
Kareem Bai's goals for this visit include:   Chief Complaint   Patient presents with     Procedure     Appointment and Demographic Information       He requests these members of his care team be copied on today's visit information:     PCP: Ana Rodrigues    Referring Provider:  Joaquin Garcia MD  [address] [place], [place] [id number]  [place] [address]    /75 (BP Location: Right arm, Patient Position: Sitting, Cuff Size: Adult Large)   Temp 97.8  F (36.6  C) (Oral)   Wt 102.6 kg (226 lb 4.8 oz)   SpO2 95%   BMI 29.86 kg/m      Do you need any medication refills at today's visit? No

## 2019-10-29 NOTE — TELEPHONE ENCOUNTER
----- Message from Joaquin Garcia MD sent at 10/29/2019 12:36 PM CDT -----  Please notify the patient that his blood sugar is only mildly elevated and certainly in the safe range, but he should meet with his new PCP to discuss whether he has prediabetes or diabetes, and what to do about it. He should meet with a dietician through his PCP office as well.

## 2019-10-29 NOTE — TELEPHONE ENCOUNTER
Tried to reach the pt to go over results but there was no answer so a message was left on his  requesting a call back.  Evelina Martin, RNCC  Neurology

## 2019-10-29 NOTE — PATIENT INSTRUCTIONS
Preventive Care:    Colorectal Cancer Screening: During our visit today, we discussed that it is recommended you receive colorectal cancer screening. Please call or make an appointment with your primary care provider to discuss this. You may also call the [facility] scheduling line ([phone]) to set up a colonoscopy appointment.

## 2019-10-29 NOTE — LETTER
10/29/2019         RE: Kareem Bai  [address]  [facility] [address]        Dear Colleague,    Thank you for referring your patient, Kareem Bai, to the [facility]. Please see a copy of my visit note below.    Procedure note: Skin biopsy. Indication: sensory disturbance, skin. After obtaining informed consent, 3 mm skin biopsies were performed over the left foot and calf. 1% lidocaine anesthesia and betadine sterile prep were used. The patient tolerated the procedure well. Wound care and patient education were provided by Evelina Martin R.N.    I also explained that his hemoglobin A1c was elevated and needs to be reviewed with PCP. He is obtaining a new PCP, possibly a Dr Elizondo at the same  clinic he has been attending. We will obtain a BMP today. He has some fatigue and frequent urination but does not feel ill.     Again, thank you for allowing me to participate in the care of your patient.        Sincerely,        Joaquin Garcia MD

## 2019-11-04 NOTE — TELEPHONE ENCOUNTER
Spoke to the pt about his glucose results. He actually met with Dr Elizondo today to establish care and the abnormal glucose was addressed.  Evelina Martin, RNCC  Neurology

## 2019-11-15 LAB — LAB SCANNED RESULT: NORMAL

## 2020-01-07 NOTE — PLAN OF CARE
Patient Active Problem List    Diagnosis Date Noted   â¢ Encounter for antineoplastic chemotherapy and immunotherapy 09/04/2018     Priority: High   â¢ Light chain disease/Myeloma 07/26/2018     Priority: High     Overview Note:     CANCER: Multiple myeloma/light chain disease  DX: July 2018  CHARACTERISTICS/BIOMARKERS: Low immunoglobulins, free kappa of 1,655 kappa/lambda ratio  2,018. Normal calcium, creatinine 4.29, hemoglobin 8.9  STAGE:   PRESENTATION: Worsening anemia   INVOLVEMENT:    PREDICTIVE/PROGNOSTIC MARKERS: Frail health, performance status of 2  GOAL OF TREATMENT: Palliative  DISEASE STATUS:   Very good partial response with first cycle of therapy, creatinine down to 2.45  CURRENT TREATMENT:    Maintenance bortezomib 1.3 mg/m2 SQ D1, D15 q 28 days with weekly dexamethasone  Supportive care with denosumab every 12 weeks  PAST TREATMENT:   CyBorD (oral cyclophosphamide, subcutaneous bortezomib weekly, dexamethasone weekly) 8/2/18- 12/19/18 with very strong partial response     â¢ Anemia of chronic renal failure, stage 3 (moderate) (CMS/[facility]) 02/16/2017     Priority: Medium   â¢ Hypertension 10/10/2013     Priority: Medium   â¢ Followed by palliative care service 11/28/2018     Priority: Low     Overview Note:     Marcy Johansen NP  Oncology palliative care     â¢ Trochanteric bursitis of right hip 10/03/2017     Priority: Low     Overview Note:     Symptoms for 6 months. X-rays 10/3/17 show moderate hip arthritis however symptoms localized to trochanteric bursa.  Plan PT     â¢ Cystocele 06/30/2015     Priority: Low   â¢ Choroidal nevus of right eye 10/22/2014     Priority: Low   â¢ Macular degeneration, dry, right 10/22/2014     Priority: Low   â¢ Exudative age-related macular degeneration of left eye (CMS/[facility]) 10/22/2014     Priority: Low   â¢ Myopia with astigmatism and presbyopia 10/22/2014     Priority: Low   â¢ Astigmatism with presbyopia 10/22/2014     Priority: Low   â¢ Osteoarthritis of left knee Problem: Patient Care Overview  Goal: Plan of Care/Patient Progress Review  Discharge Planner PT   Patient plan for discharge: Home with OP PT  Current status: Supine to sitting independently with HOB flat and no railing.  Sit to/from standing from EOB with FWW and CGA x 1.  Pt ambulated 200' with FWW and CGA to SBA x 1.  Pt instructed in and issued TKA HEP, knee ROM 0-11-90.    Barriers to return to prior living situation: No barriers to discharge  Recommendations for discharge: Home with OP PT  Rationale for recommendations: Pt would benefit from further skilled PT for LE strengthening/ROM and gait training.        Entered by: Lexie Mccormack 01/05/2018 1:18 PM            09/10/2014     Priority: Low   â¢ Osteoarthritis of right knee 09/10/2014     Priority: Low   â¢ Hypothyroidism 10/10/2013     Priority: Low   â¢ Pernicious anemia 10/10/2013     Priority: Low       HISTORY OF PRESENT ILLNESS:  Monalisa Ramirez returns to the office in follow-up. She is doing well and is tolerating therapy with bortezomib every other week. She denies any new back or bony pain. No new neurologic symptoms. No fevers, chills, night sweats. No significant changes in her energy level. No changes in her weight or appetite. PAST MEDICAL HISTORY AND PAST SURGICAL HISTORY:  Reviewed and interval changes as noted above. ALLERGIES AND DRUG INTOLERANCE:  Reviewed and interval changes as noted above    CURRENT MEDICATIONS:  Reviewed and interval changes as noted above    FAMILY HISTORY AND SOCIAL HISTORY:  Reviewed and interval changes as noted above. REVIEW OF SYSTEMS:  Per electronic medical record notes, reviewed and agreed upon. PHYSICAL EXAMINATION:  Oncology Encounter Vitals [01/07/20 1247]   ONC OP Encounter Vitals Group      /71      Pulse 77      Resp       Temp 97.2 Â°F (36.2 Â°C)      Temp src Temporal      SpO2 94 %      Weight 166 lb 0.1 oz (75.3 kg)      Height       Pain Score  0      Pain Location       Pain Education? BSA (Calculated - m2) - Catarino & Catarino       BMI (Calculated)        ECOG Performance Status   1 - No physically strenuous activity, but ambulatory and able to carry out light or sedentary work. General Appearance: 79year old female in no acute distress. Frail elderly woman in no acute distress. Walks with the assistance of a cane. Psychiatric: Mood and affect are normal.  Judgment and insight are appropriate. HEENT:  Head: Normocephalic, atraumatic. Mouth: No oral lesions. Lymphatic: No pathological adenopathy palpated. Cardiovascular:  No JVD(jugular venous distention).   Heart had a regular rhythm and rate with somewhat distant heart tones.. Respiratory:  Normal respiratory effort, bilateral and symmetric expansion. Lungs are clear to auscultation bilaterally. Abdomen: No masses or ascites, soft and non-tender, no hepatosplenomegaly. Normal bowel sounds, no rebound tenderness. Musculoskeletal: No new tenderness to palpation of the spine. Extremities without clubbing, cyanosis or edema. Skin: No rashes, petechiae or ecchymosis. No icterus no ulcerations. Neurologic: Alert and oriented times 3. SIGNIFICANT LABORATORY AND RADIOLOGY DATA:  Office Visit on 01/07/2020   Component Date Value   â¢ VIA MED ONC PATHWAYS TAG 01/07/2020 [id number]    Lab Services on 01/07/2020   Component Date Value   â¢ WBC 01/07/2020 6.5    â¢ RBC 01/07/2020 3.08*   â¢ HGB 01/07/2020 10.3*   â¢ HCT 01/07/2020 31.8*   â¢ MCV 01/07/2020 103.2*   â¢ MCH 01/07/2020 33.4    â¢ MCHC 01/07/2020 32.4    â¢ RDW-CV 01/07/2020 12.9    â¢ PLT 01/07/2020 223    â¢ NRBC 01/07/2020 0    â¢ DIFF TYPE 01/07/2020 AUTOMATED DIFFERENTIAL    â¢ Neutrophil 01/07/2020 74    â¢ LYMPH 01/07/2020 9    â¢ MONO 01/07/2020 13    â¢ EOSIN 01/07/2020 2    â¢ BASO 01/07/2020 1    â¢ Percent Immature Granulo* 01/07/2020 1    â¢ Absolute Neutrophil 01/07/2020 4.9    â¢ Absolute Lymph 01/07/2020 0.6*   â¢ Absolute Mono 01/07/2020 0.9    â¢ Absolute Eos 01/07/2020 0.1    â¢ Absolute Baso 01/07/2020 0.0    â¢ Absolute Immature Granul* 01/07/2020 0.0        CLINICAL IMPRESSION AND PLAN:  1. Light chain myeloma. Patient is doing well and has tolerated therapy well and achieved a good partial response. Continue treatment every other week with bortezomib. Continue to monitor closely. I will see her back in 6 weeks. 2. Myeloma bone disease. Continue extended interval dosing of denosumab. 3. Multifactorial anemia with a component of anemia of chronic disease. Asymptomatic. Stable hemoglobin. Continue to monitor and consider starting erythropoietin p.r.n. hemoglobin less than 10.

## 2021-09-08 ENCOUNTER — APPOINTMENT (OUTPATIENT)
Dept: CT IMAGING | Facility: CLINIC | Age: 66
End: 2021-09-08
Attending: EMERGENCY MEDICINE
Payer: MEDICARE

## 2021-09-08 ENCOUNTER — HOSPITAL ENCOUNTER (EMERGENCY)
Facility: CLINIC | Age: 66
Discharge: HOME | End: 2021-09-08
Attending: EMERGENCY MEDICINE | Admitting: EMERGENCY MEDICINE
Payer: MEDICARE

## 2021-09-08 ENCOUNTER — APPOINTMENT (OUTPATIENT)
Dept: GENERAL RADIOLOGY | Facility: CLINIC | Age: 66
End: 2021-09-08
Attending: EMERGENCY MEDICINE
Payer: MEDICARE

## 2021-09-08 VITALS
SYSTOLIC BLOOD PRESSURE: 116 MMHG | HEIGHT: 73 IN | OXYGEN SATURATION: 99 % | WEIGHT: 183.5 LBS | BODY MASS INDEX: 24.32 KG/M2 | TEMPERATURE: 98.1 F | DIASTOLIC BLOOD PRESSURE: 77 MMHG | HEART RATE: 49 BPM | RESPIRATION RATE: 18 BRPM

## 2021-09-08 DIAGNOSIS — R53.83 FATIGUE, UNSPECIFIED TYPE: ICD-10-CM

## 2021-09-08 DIAGNOSIS — R63.4 WEIGHT LOSS: ICD-10-CM

## 2021-09-08 DIAGNOSIS — R07.89 CHEST DISCOMFORT: ICD-10-CM

## 2021-09-08 LAB
ALBUMIN SERPL-MCNC: 3.3 G/DL (ref 3.4–5)
ALBUMIN UR-MCNC: NEGATIVE MG/DL
ALP SERPL-CCNC: 53 U/L (ref 40–150)
ALT SERPL W P-5'-P-CCNC: 24 U/L (ref 0–70)
ANION GAP SERPL CALCULATED.3IONS-SCNC: 3 MMOL/L (ref 3–14)
APPEARANCE UR: CLEAR
AST SERPL W P-5'-P-CCNC: 15 U/L (ref 0–45)
BASOPHILS # BLD AUTO: 0 10E3/UL (ref 0–0.2)
BASOPHILS NFR BLD AUTO: 1 %
BILIRUB SERPL-MCNC: 0.7 MG/DL (ref 0.2–1.3)
BILIRUB UR QL STRIP: NEGATIVE
BUN SERPL-MCNC: 23 MG/DL (ref 7–30)
CALCIUM SERPL-MCNC: 8.8 MG/DL (ref 8.5–10.1)
CHLORIDE BLD-SCNC: 107 MMOL/L (ref 94–109)
CO2 SERPL-SCNC: 30 MMOL/L (ref 20–32)
COLOR UR AUTO: ABNORMAL
CREAT SERPL-MCNC: 0.77 MG/DL (ref 0.66–1.25)
EOSINOPHIL # BLD AUTO: 0.1 10E3/UL (ref 0–0.7)
EOSINOPHIL NFR BLD AUTO: 2 %
ERYTHROCYTE [DISTWIDTH] IN BLOOD BY AUTOMATED COUNT: 12.6 % (ref 10–15)
GFR SERPL CREATININE-BSD FRML MDRD: >90 ML/MIN/1.73M2
GLUCOSE BLD-MCNC: 86 MG/DL (ref 70–99)
GLUCOSE BLDC GLUCOMTR-MCNC: 99 MG/DL (ref 70–99)
GLUCOSE UR STRIP-MCNC: NEGATIVE MG/DL
HCT VFR BLD AUTO: 37 % (ref 40–53)
HGB BLD-MCNC: 12.4 G/DL (ref 13.3–17.7)
HGB UR QL STRIP: NEGATIVE
IMM GRANULOCYTES # BLD: 0 10E3/UL
IMM GRANULOCYTES NFR BLD: 0 %
KETONES UR STRIP-MCNC: NEGATIVE MG/DL
LEUKOCYTE ESTERASE UR QL STRIP: NEGATIVE
LYMPHOCYTES # BLD AUTO: 2.3 10E3/UL (ref 0.8–5.3)
LYMPHOCYTES NFR BLD AUTO: 34 %
MCH RBC QN AUTO: 30.5 PG (ref 26.5–33)
MCHC RBC AUTO-ENTMCNC: 33.5 G/DL (ref 31.5–36.5)
MCV RBC AUTO: 91 FL (ref 78–100)
MONOCYTES # BLD AUTO: 0.5 10E3/UL (ref 0–1.3)
MONOCYTES NFR BLD AUTO: 7 %
NEUTROPHILS # BLD AUTO: 3.9 10E3/UL (ref 1.6–8.3)
NEUTROPHILS NFR BLD AUTO: 56 %
NITRATE UR QL: NEGATIVE
NRBC # BLD AUTO: 0 10E3/UL
NRBC BLD AUTO-RTO: 0 /100
PH UR STRIP: 7.5 [PH] (ref 5–7)
PLATELET # BLD AUTO: 275 10E3/UL (ref 150–450)
POTASSIUM BLD-SCNC: 3.8 MMOL/L (ref 3.4–5.3)
PROT SERPL-MCNC: 6.2 G/DL (ref 6.8–8.8)
RBC # BLD AUTO: 4.06 10E6/UL (ref 4.4–5.9)
RBC URINE: 1 /HPF
SODIUM SERPL-SCNC: 140 MMOL/L (ref 133–144)
SP GR UR STRIP: 1.02 (ref 1–1.03)
TROPONIN I SERPL-MCNC: <0.015 UG/L (ref 0–0.04)
UROBILINOGEN UR STRIP-MCNC: NORMAL MG/DL
WBC # BLD AUTO: 6.8 10E3/UL (ref 4–11)
WBC URINE: 1 /HPF

## 2021-09-08 PROCEDURE — 250N000011 HC RX IP 250 OP 636: Performed by: EMERGENCY MEDICINE

## 2021-09-08 PROCEDURE — 99285 EMERGENCY DEPT VISIT HI MDM: CPT | Mod: 25 | Performed by: EMERGENCY MEDICINE

## 2021-09-08 PROCEDURE — 81001 URINALYSIS AUTO W/SCOPE: CPT | Performed by: EMERGENCY MEDICINE

## 2021-09-08 PROCEDURE — 71046 X-RAY EXAM CHEST 2 VIEWS: CPT

## 2021-09-08 PROCEDURE — 80053 COMPREHEN METABOLIC PANEL: CPT | Performed by: EMERGENCY MEDICINE

## 2021-09-08 PROCEDURE — 84484 ASSAY OF TROPONIN QUANT: CPT | Performed by: EMERGENCY MEDICINE

## 2021-09-08 PROCEDURE — 85025 COMPLETE CBC W/AUTO DIFF WBC: CPT | Performed by: EMERGENCY MEDICINE

## 2021-09-08 PROCEDURE — 250N000009 HC RX 250: Performed by: EMERGENCY MEDICINE

## 2021-09-08 PROCEDURE — 71275 CT ANGIOGRAPHY CHEST: CPT

## 2021-09-08 PROCEDURE — 36415 COLL VENOUS BLD VENIPUNCTURE: CPT | Performed by: EMERGENCY MEDICINE

## 2021-09-08 PROCEDURE — 93005 ELECTROCARDIOGRAM TRACING: CPT | Performed by: EMERGENCY MEDICINE

## 2021-09-08 RX ORDER — IOPAMIDOL 755 MG/ML
100 INJECTION, SOLUTION INTRAVASCULAR ONCE
Status: COMPLETED | OUTPATIENT
Start: 2021-09-08 | End: 2021-09-08

## 2021-09-08 RX ADMIN — IOPAMIDOL 64 ML: 755 INJECTION, SOLUTION INTRAVENOUS at 18:24

## 2021-09-08 RX ADMIN — SODIUM CHLORIDE 84 ML: 9 INJECTION, SOLUTION INTRAVENOUS at 18:36

## 2021-09-08 ASSESSMENT — ENCOUNTER SYMPTOMS
COUGH: 0
FEVER: 0
CHILLS: 0
NUMBNESS: 1
FATIGUE: 1
APPETITE CHANGE: 1
ACTIVITY CHANGE: 1
UNEXPECTED WEIGHT CHANGE: 1
SHORTNESS OF BREATH: 0

## 2021-09-08 ASSESSMENT — MIFFLIN-ST. JEOR: SCORE: 1661.23

## 2021-09-08 NOTE — ED NOTES
Pt left the triage area to get something from his car. Pt also reported the chest discomfort was going on for long time.

## 2021-09-08 NOTE — ED PROVIDER NOTES
"ED Provider Note  [facility]      History     Chief Complaint   Patient presents with     Chest Pain     pt reported having presure in the chest discomfort and thightness for the last couple of months, noticed weight lost, denies pain at this time,      The history is provided by the patient and the spouse.     Kareem Bai is a 66 year old male who has a significant medical history of prediabetes, neuropathy, and sleep apnea who presents to the ED today for chest discomfort, fatigue, and unexplained weight loss. Patient reports having chest discomfort on the left side. Patient describes his chest pain as \"pressure on the left side that wraps around the chest,\" and began having symptoms approximately 3 months ago. The patient states that the chest pain is constant and has intensified from occasional cramping to constant pressure in the last 2 weeks. The patient feels fatigued after minimal activity and wants to sleep often due to lack of energy. The patient reports unexplained weight loss and that he has lost 15 pounds in the last 6 weeks and 30 pounds in the last year.  The patient states that his appetite is poor. He controls his diet as he is prediabetic.The patient was prescribed gabapentin for numbness in his feet but states that it is not helping.  Patient has also been prescribed Metformin but states he does not take it.  The patient denies shortness of breath, fevers, cough, nausea, vomiting, and belly pain. Patient reports an increase in urination frequency without a burning sensation. Of note, patient states he ceased daily smoking in 1973, but enjoys a cigar occasionally.  Patient reports that his blood pressure is under control and that he is tapering off of blood pressure medications. The patient had blood work done at [email or web address] on 9/1/21. The patient's TSH, CRP and ESR results were all found in the normal range. The patient had an abdominal ultrasound done at " [facility] on 9/2/21 and the results are copied below.     Abdominal Ultrasound: 9/2/21  IMPRESSION:   Negative abdominal ultrasound. No sonographic findings to explain the patient's symptoms.    Wt Readings from Last 10 Encounters:   09/08/21 83.2 kg (183 lb 8 oz)   10/29/19 102.6 kg (226 lb 4.8 oz)   10/18/19 102.3 kg (225 lb 9.6 oz)   03/12/18 104.3 kg (230 lb)   01/18/18 98.9 kg (218 lb)   01/05/18 101.1 kg (222 lb 14.2 oz)   07/31/17 105.2 kg (232 lb)     Past Medical History  Past Medical History:   Diagnosis Date     Hypertension      Neuropathy      Prediabetes      Sleep apnea      Past Surgical History:   Procedure Laterality Date     OPTICAL TRACKING SYSTEM ARTHROPLASTY KNEE Right 1/4/2018    Procedure: OPTICAL TRACKING SYSTEM ARTHROPLASTY KNEE;  Right Total Knee Arthroplasty;  Surgeon: Marcello Beatty MD;  Location: [facility] OR     aspirin - buffered (ASCRIPTIN) 325 MG TABS tablet  gabapentin (NEURONTIN) 300 MG capsule  HYDROcodone-acetaminophen (NORCO) 5-325 MG per tablet  lisinopril-hydrochlorothiazide (PRINZIDE/ZESTORETIC) 20-25 MG per tablet  naproxen (NAPROSYN) 500 MG tablet  pravastatin (PRAVACHOL) 10 MG tablet      Allergies   Allergen Reactions     Ampicillin Other (See Comments) and Hives     Codeine GI Disturbance     LW Reaction: gi upset     Simvastatin Other (See Comments)     Family History  No family history on file.  Social History   Social History     Tobacco Use     Smoking status: Current Some Day Smoker     Types: Cigars     Smokeless tobacco: Never Used     Tobacco comment: 5-6/week   Substance Use Topics     Alcohol use: Yes     Comment: Drink every 2-3 days     Drug use: No      Past medical history, past surgical history, medications, allergies, family history, and social history were reviewed with the patient. No additional pertinent items.       Review of Systems   Constitutional: Positive for activity change, appetite change, fatigue and unexpected weight change. Negative for  "chills and fever.   Respiratory: Negative for cough and shortness of breath.    Cardiovascular: Positive for chest pain.   Genitourinary:        Increase in urination frequency   Neurological: Positive for numbness.   All other systems reviewed and are negative.      Physical Exam   BP: 111/64  Pulse: 63  Temp: 98.1  F (36.7  C)  Resp: 18  Height: 185.4 cm (6' 1\")  Weight: 83.2 kg (183 lb 8 oz)  SpO2: 98 %  Physical Exam  Vitals and nursing note reviewed.         GEN:  Alert, well developed, no acute distress  HEENT:  PERRL, EOMI, Mucous membranes are moist.   Cardio:  RRR, no murmur, radial pulses equal bilaterally  PULM:  Lungs clear, good air movement, no wheezes, rales   Abd:  Soft, normal bowel sounds, no focal tenderness  Back exam:  No CVA tenderness  Musculoskeletal:  normal range of motion, no lower extremity swelling or calf tenderness  Neuro:  Alert and oriented X3, Follows commands, moving all extremities spontaneously   Skin:  Warm, dry   ED Course     3:30 PM  The patient was seen and examined by Lorie Segura MD in Room ED08.     Procedures            EKG Interpretation:      Interpreted by Lorie Segura MD  Time reviewed: 15:00  Symptoms at time of EKG: chest pressure   Rhythm: normal sinus   Rate: normal  Axis: normal  Ectopy: none  Conduction: normal  ST Segments/ T Waves: No ST-T wave changes  Q Waves: none  Comparison to prior: No old EKG available    Clinical Impression: normal EKG    Labs were reviewed by me and results are shown below.  Chest x-ray was reviewed by me and results are shown below.         Results for orders placed or performed during the hospital encounter of 09/08/21   XR Chest 2 Views     Status: None    Narrative    CHEST TWO VIEWS  9/8/2021 4:16 PM     HISTORY:  Left-sided chest discomfort.    COMPARISON: None.      Impression    IMPRESSION: PA and lateral views of the chest. Lungs are clear. Heart  is normal in size. No effusions are evident. No " pneumothorax. Ribs  appear intact where imaged.    ALBERTO REDMAN MD         SYSTEM ID:  [id number]   Glucose by meter     Status: Normal   Result Value Ref Range    GLUCOSE BY METER POCT 99 70 - 99 mg/dL   CBC with platelets differential     Status: Abnormal    Narrative    The following orders were created for panel order CBC with platelets differential.  Procedure                               Abnormality         Status                     ---------                               -----------         ------                     CBC with platelets and d...[[id number]]  Abnormal            Final result                 Please view results for these tests on the individual orders.   Comprehensive metabolic panel     Status: Abnormal   Result Value Ref Range    Sodium 140 133 - 144 mmol/L    Potassium 3.8 3.4 - 5.3 mmol/L    Chloride 107 94 - 109 mmol/L    Carbon Dioxide (CO2) 30 20 - 32 mmol/L    Anion Gap 3 3 - 14 mmol/L    Urea Nitrogen 23 7 - 30 mg/dL    Creatinine 0.77 0.66 - 1.25 mg/dL    Calcium 8.8 8.5 - 10.1 mg/dL    Glucose 86 70 - 99 mg/dL    Alkaline Phosphatase 53 40 - 150 U/L    AST 15 0 - 45 U/L    ALT 24 0 - 70 U/L    Protein Total 6.2 (L) 6.8 - 8.8 g/dL    Albumin 3.3 (L) 3.4 - 5.0 g/dL    Bilirubin Total 0.7 0.2 - 1.3 mg/dL    GFR Estimate >90 >60 mL/min/1.73m2   CBC with platelets and differential     Status: Abnormal   Result Value Ref Range    WBC Count 6.8 4.0 - 11.0 10e3/uL    RBC Count 4.06 (L) 4.40 - 5.90 10e6/uL    Hemoglobin 12.4 (L) 13.3 - 17.7 g/dL    Hematocrit 37.0 (L) 40.0 - 53.0 %    MCV 91 78 - 100 fL    MCH 30.5 26.5 - 33.0 pg    MCHC 33.5 31.5 - 36.5 g/dL    RDW 12.6 10.0 - 15.0 %    Platelet Count 275 150 - 450 10e3/uL    % Neutrophils 56 %    % Lymphocytes 34 %    % Monocytes 7 %    % Eosinophils 2 %    % Basophils 1 %    % Immature Granulocytes 0 %    NRBCs per 100 WBC 0 <1 /100    Absolute Neutrophils 3.9 1.6 - 8.3 10e3/uL    Absolute Lymphocytes 2.3 0.8 - 5.3 10e3/uL    Absolute  Monocytes 0.5 0.0 - 1.3 10e3/uL    Absolute Eosinophils 0.1 0.0 - 0.7 10e3/uL    Absolute Basophils 0.0 0.0 - 0.2 10e3/uL    Absolute Immature Granulocytes 0.0 <=0.0 10e3/uL    Absolute NRBCs 0.0 10e3/uL   UA with Microscopic reflex to Culture     Status: Abnormal    Specimen: Urine, Clean Catch   Result Value Ref Range    Color Urine Light Yellow Colorless, Straw, Light Yellow, Yellow    Appearance Urine Clear Clear    Glucose Urine Negative Negative mg/dL    Bilirubin Urine Negative Negative    Ketones Urine Negative Negative mg/dL    Specific Gravity Urine 1.025 1.003 - 1.035    Blood Urine Negative Negative    pH Urine 7.5 (H) 5.0 - 7.0    Protein Albumin Urine Negative Negative mg/dL    Urobilinogen Urine Normal Normal, 2.0 mg/dL    Nitrite Urine Negative Negative    Leukocyte Esterase Urine Negative Negative    RBC Urine 1 <=2 /HPF    WBC Urine 1 <=5 /HPF    Narrative    Urine Culture not indicated   Troponin I     Status: Normal   Result Value Ref Range    Troponin I <0.015 0.000 - 0.045 ug/L   EKG 12 lead     Status: None (Preliminary result)   Result Value Ref Range    Systolic Blood Pressure  mmHg    Diastolic Blood Pressure  mmHg    Ventricular Rate 53 BPM    Atrial Rate 53 BPM    KY Interval 180 ms    QRS Duration 110 ms     ms    QTc 426 ms    P Axis 66 degrees    R AXIS -39 degrees    T Axis 35 degrees    Interpretation ECG       Sinus bradycardia  Left axis deviation  Abnormal ECG       Medications - No data to display     Assessments & Plan (with Medical Decision Making)   Patient presents with fatigue, unintentional weight loss, some left-sided chest pressure.  Initial work-up is negative.  Since symptoms have been present for a couple of months now, it is unlikely to be acute coronary syndrome, PE, aortic dissection.  Chest x-ray is negative for pneumothorax, pneumonia, pulmonary edema.  At this time, patient's primary concern is malignancy.  Patient's primary care physician has retired and  he does not currently have a primary care provider.  Due to this, I did agree to do CT scan of the chest to look for possible mass given his symptoms.  Patient understands that he will still need to obtain a primary care physician as soon as possible and obtained timely follow-up for further evaluation regardless of what the CT shows.  Patient was signed out to the evening shift with a CT of the chest with IV contrast pending.    I have reviewed the nursing notes. I have reviewed the findings, diagnosis, plan and need for follow up with the patient.    New Prescriptions    No medications on file       Final diagnoses:   Weight loss   Fatigue, unspecified type   Chest discomfort     I, Jacinto Phillips, am serving as a trained medical scribe to document services personally performed by Lorie Segura MD, based on the provider's statements to me.      I, Lorie Segura MD, was physically present and have reviewed and verified the accuracy of this note documented by Jacinto Phillips.     --  Lorie Segura MD  [facility] EMERGENCY DEPARTMENT  9/8/2021     Lorie Segura MD  09/08/21 [id number]

## 2021-09-08 NOTE — ED NOTES
"Writer called lab regarding add on blood work, staff stated \"I am doing it right now.\" Writer to continue to monitor. Pt resting in bed, in nad.  "

## 2021-09-09 LAB
ATRIAL RATE - MUSE: 53 BPM
DIASTOLIC BLOOD PRESSURE - MUSE: NORMAL MMHG
INTERPRETATION ECG - MUSE: NORMAL
P AXIS - MUSE: 66 DEGREES
PR INTERVAL - MUSE: 180 MS
QRS DURATION - MUSE: 110 MS
QT - MUSE: 454 MS
QTC - MUSE: 426 MS
R AXIS - MUSE: -39 DEGREES
SYSTOLIC BLOOD PRESSURE - MUSE: NORMAL MMHG
T AXIS - MUSE: 35 DEGREES
VENTRICULAR RATE- MUSE: 53 BPM

## 2021-10-12 ENCOUNTER — APPOINTMENT (OUTPATIENT)
Dept: GENERAL RADIOLOGY | Facility: CLINIC | Age: 66
DRG: 623 | End: 2021-10-12
Attending: EMERGENCY MEDICINE
Payer: MEDICARE

## 2021-10-12 ENCOUNTER — HOSPITAL ENCOUNTER (INPATIENT)
Facility: CLINIC | Age: 66
LOS: 3 days | Discharge: HOME | DRG: 623 | End: 2021-10-15
Attending: EMERGENCY MEDICINE | Admitting: INTERNAL MEDICINE
Payer: MEDICARE

## 2021-10-12 DIAGNOSIS — L97.522 DIABETIC ULCER OF TOE OF LEFT FOOT ASSOCIATED WITH TYPE 2 DIABETES MELLITUS, WITH FAT LAYER EXPOSED (H): ICD-10-CM

## 2021-10-12 DIAGNOSIS — E11.621 DIABETIC ULCER OF TOE OF LEFT FOOT ASSOCIATED WITH TYPE 2 DIABETES MELLITUS, WITH FAT LAYER EXPOSED (H): ICD-10-CM

## 2021-10-12 DIAGNOSIS — Z11.52 ENCOUNTER FOR SCREENING LABORATORY TESTING FOR SEVERE ACUTE RESPIRATORY SYNDROME CORONAVIRUS 2 (SARS-COV-2): ICD-10-CM

## 2021-10-12 DIAGNOSIS — L97.522 ULCER OF LEFT FOOT, WITH FAT LAYER EXPOSED (H): ICD-10-CM

## 2021-10-12 DIAGNOSIS — E11.621 TYPE 2 DIABETES MELLITUS WITH FOOT ULCER (CODE) (H): ICD-10-CM

## 2021-10-12 LAB
ANION GAP SERPL CALCULATED.3IONS-SCNC: 2 MMOL/L (ref 3–14)
BASOPHILS # BLD AUTO: 0 10E3/UL (ref 0–0.2)
BASOPHILS NFR BLD AUTO: 1 %
BUN SERPL-MCNC: 13 MG/DL (ref 7–30)
CALCIUM SERPL-MCNC: 8.8 MG/DL (ref 8.5–10.1)
CHLORIDE BLD-SCNC: 105 MMOL/L (ref 94–109)
CO2 SERPL-SCNC: 31 MMOL/L (ref 20–32)
CREAT SERPL-MCNC: 0.76 MG/DL (ref 0.66–1.25)
CRP SERPL-MCNC: 43 MG/L (ref 0–8)
EOSINOPHIL # BLD AUTO: 0.1 10E3/UL (ref 0–0.7)
EOSINOPHIL NFR BLD AUTO: 1 %
ERYTHROCYTE [DISTWIDTH] IN BLOOD BY AUTOMATED COUNT: 12.9 % (ref 10–15)
ERYTHROCYTE [SEDIMENTATION RATE] IN BLOOD BY WESTERGREN METHOD: 16 MM/HR (ref 0–20)
GFR SERPL CREATININE-BSD FRML MDRD: >90 ML/MIN/1.73M2
GLUCOSE BLD-MCNC: 87 MG/DL (ref 70–99)
GLUCOSE BLDC GLUCOMTR-MCNC: 184 MG/DL (ref 70–99)
HCT VFR BLD AUTO: 40.7 % (ref 40–53)
HGB BLD-MCNC: 13.6 G/DL (ref 13.3–17.7)
HOLD SPECIMEN: NORMAL
HOLD SPECIMEN: NORMAL
IMM GRANULOCYTES # BLD: 0 10E3/UL
IMM GRANULOCYTES NFR BLD: 0 %
LYMPHOCYTES # BLD AUTO: 2.2 10E3/UL (ref 0.8–5.3)
LYMPHOCYTES NFR BLD AUTO: 28 %
MCH RBC QN AUTO: 30.7 PG (ref 26.5–33)
MCHC RBC AUTO-ENTMCNC: 33.4 G/DL (ref 31.5–36.5)
MCV RBC AUTO: 92 FL (ref 78–100)
MONOCYTES # BLD AUTO: 0.5 10E3/UL (ref 0–1.3)
MONOCYTES NFR BLD AUTO: 6 %
NEUTROPHILS # BLD AUTO: 4.9 10E3/UL (ref 1.6–8.3)
NEUTROPHILS NFR BLD AUTO: 64 %
NRBC # BLD AUTO: 0 10E3/UL
NRBC BLD AUTO-RTO: 0 /100
PLATELET # BLD AUTO: 296 10E3/UL (ref 150–450)
POTASSIUM BLD-SCNC: 4 MMOL/L (ref 3.4–5.3)
RBC # BLD AUTO: 4.43 10E6/UL (ref 4.4–5.9)
SARS-COV-2 RNA RESP QL NAA+PROBE: NEGATIVE
SODIUM SERPL-SCNC: 138 MMOL/L (ref 133–144)
WBC # BLD AUTO: 7.7 10E3/UL (ref 4–11)

## 2021-10-12 PROCEDURE — 86140 C-REACTIVE PROTEIN: CPT | Performed by: EMERGENCY MEDICINE

## 2021-10-12 PROCEDURE — 85004 AUTOMATED DIFF WBC COUNT: CPT | Performed by: EMERGENCY MEDICINE

## 2021-10-12 PROCEDURE — 99285 EMERGENCY DEPT VISIT HI MDM: CPT | Mod: 25 | Performed by: EMERGENCY MEDICINE

## 2021-10-12 PROCEDURE — 96365 THER/PROPH/DIAG IV INF INIT: CPT | Performed by: EMERGENCY MEDICINE

## 2021-10-12 PROCEDURE — 96367 TX/PROPH/DG ADDL SEQ IV INF: CPT | Performed by: EMERGENCY MEDICINE

## 2021-10-12 PROCEDURE — C9803 HOPD COVID-19 SPEC COLLECT: HCPCS | Performed by: EMERGENCY MEDICINE

## 2021-10-12 PROCEDURE — 87040 BLOOD CULTURE FOR BACTERIA: CPT | Performed by: EMERGENCY MEDICINE

## 2021-10-12 PROCEDURE — 99223 1ST HOSP IP/OBS HIGH 75: CPT | Mod: AI | Performed by: INTERNAL MEDICINE

## 2021-10-12 PROCEDURE — 73630 X-RAY EXAM OF FOOT: CPT | Mod: LT

## 2021-10-12 PROCEDURE — 85652 RBC SED RATE AUTOMATED: CPT | Performed by: EMERGENCY MEDICINE

## 2021-10-12 PROCEDURE — 80048 BASIC METABOLIC PNL TOTAL CA: CPT | Performed by: EMERGENCY MEDICINE

## 2021-10-12 PROCEDURE — 87635 SARS-COV-2 COVID-19 AMP PRB: CPT | Performed by: EMERGENCY MEDICINE

## 2021-10-12 PROCEDURE — 258N000003 HC RX IP 258 OP 636

## 2021-10-12 PROCEDURE — 99285 EMERGENCY DEPT VISIT HI MDM: CPT | Performed by: EMERGENCY MEDICINE

## 2021-10-12 PROCEDURE — 120N000002 HC R&B MED SURG/OB UMMC

## 2021-10-12 PROCEDURE — 250N000011 HC RX IP 250 OP 636: Performed by: EMERGENCY MEDICINE

## 2021-10-12 PROCEDURE — 36415 COLL VENOUS BLD VENIPUNCTURE: CPT | Performed by: EMERGENCY MEDICINE

## 2021-10-12 PROCEDURE — 87186 SC STD MICRODIL/AGAR DIL: CPT | Performed by: EMERGENCY MEDICINE

## 2021-10-12 RX ORDER — DEXTROSE MONOHYDRATE 25 G/50ML
25-50 INJECTION, SOLUTION INTRAVENOUS
Status: DISCONTINUED | OUTPATIENT
Start: 2021-10-12 | End: 2021-10-15 | Stop reason: HOSPADM

## 2021-10-12 RX ORDER — PIPERACILLIN SODIUM, TAZOBACTAM SODIUM 4; .5 G/20ML; G/20ML
4.5 INJECTION, POWDER, LYOPHILIZED, FOR SOLUTION INTRAVENOUS EVERY 6 HOURS
Status: DISCONTINUED | OUTPATIENT
Start: 2021-10-12 | End: 2021-10-12

## 2021-10-12 RX ORDER — ACETAMINOPHEN 325 MG/1
650 TABLET ORAL EVERY 6 HOURS PRN
Status: DISCONTINUED | OUTPATIENT
Start: 2021-10-12 | End: 2021-10-15 | Stop reason: HOSPADM

## 2021-10-12 RX ORDER — SODIUM CHLORIDE 9 MG/ML
INJECTION, SOLUTION INTRAVENOUS
Status: COMPLETED
Start: 2021-10-12 | End: 2021-10-12

## 2021-10-12 RX ORDER — VANCOMYCIN HYDROCHLORIDE 1 G/200ML
1000 INJECTION, SOLUTION INTRAVENOUS ONCE
Status: COMPLETED | OUTPATIENT
Start: 2021-10-12 | End: 2021-10-12

## 2021-10-12 RX ORDER — LIDOCAINE 40 MG/G
CREAM TOPICAL
Status: DISCONTINUED | OUTPATIENT
Start: 2021-10-12 | End: 2021-10-15 | Stop reason: HOSPADM

## 2021-10-12 RX ORDER — NICOTINE POLACRILEX 4 MG
15-30 LOZENGE BUCCAL
Status: DISCONTINUED | OUTPATIENT
Start: 2021-10-12 | End: 2021-10-15 | Stop reason: HOSPADM

## 2021-10-12 RX ORDER — METRONIDAZOLE 500 MG/1
500 TABLET ORAL 3 TIMES DAILY
Status: DISCONTINUED | OUTPATIENT
Start: 2021-10-12 | End: 2021-10-12

## 2021-10-12 RX ORDER — VANCOMYCIN HYDROCHLORIDE 1 G/200ML
1000 INJECTION, SOLUTION INTRAVENOUS EVERY 12 HOURS
Status: DISCONTINUED | OUTPATIENT
Start: 2021-10-13 | End: 2021-10-12

## 2021-10-12 RX ORDER — VANCOMYCIN HYDROCHLORIDE 1 G/200ML
1000 INJECTION, SOLUTION INTRAVENOUS EVERY 12 HOURS
Status: DISCONTINUED | OUTPATIENT
Start: 2021-10-13 | End: 2021-10-14

## 2021-10-12 RX ORDER — LISINOPRIL/HYDROCHLOROTHIAZIDE 10-12.5 MG
1 TABLET ORAL DAILY
Status: DISCONTINUED | OUTPATIENT
Start: 2021-10-13 | End: 2021-10-15 | Stop reason: HOSPADM

## 2021-10-12 RX ORDER — PRAVASTATIN SODIUM 10 MG
10 TABLET ORAL DAILY
Status: DISCONTINUED | OUTPATIENT
Start: 2021-10-13 | End: 2021-10-15 | Stop reason: HOSPADM

## 2021-10-12 RX ORDER — ONDANSETRON 4 MG/1
4 TABLET, ORALLY DISINTEGRATING ORAL EVERY 6 HOURS PRN
Status: DISCONTINUED | OUTPATIENT
Start: 2021-10-12 | End: 2021-10-15 | Stop reason: HOSPADM

## 2021-10-12 RX ORDER — GABAPENTIN 300 MG/1
300 CAPSULE ORAL 3 TIMES DAILY
Status: DISCONTINUED | OUTPATIENT
Start: 2021-10-12 | End: 2021-10-12

## 2021-10-12 RX ORDER — ONDANSETRON 2 MG/ML
4 INJECTION INTRAMUSCULAR; INTRAVENOUS EVERY 6 HOURS PRN
Status: DISCONTINUED | OUTPATIENT
Start: 2021-10-12 | End: 2021-10-15 | Stop reason: HOSPADM

## 2021-10-12 RX ORDER — PIPERACILLIN SODIUM, TAZOBACTAM SODIUM 4; .5 G/20ML; G/20ML
4.5 INJECTION, POWDER, LYOPHILIZED, FOR SOLUTION INTRAVENOUS EVERY 6 HOURS
Status: DISCONTINUED | OUTPATIENT
Start: 2021-10-12 | End: 2021-10-15

## 2021-10-12 RX ORDER — CEFTRIAXONE 2 G/1
2 INJECTION, POWDER, FOR SOLUTION INTRAMUSCULAR; INTRAVENOUS ONCE
Status: DISCONTINUED | OUTPATIENT
Start: 2021-10-12 | End: 2021-10-12

## 2021-10-12 RX ADMIN — SODIUM CHLORIDE 500 ML: 9 INJECTION, SOLUTION INTRAVENOUS at 21:05

## 2021-10-12 RX ADMIN — PIPERACILLIN SODIUM AND TAZOBACTAM SODIUM 4.5 G: 4; .5 INJECTION, POWDER, LYOPHILIZED, FOR SOLUTION INTRAVENOUS at 19:27

## 2021-10-12 RX ADMIN — VANCOMYCIN HYDROCHLORIDE 1000 MG: 1 INJECTION, SOLUTION INTRAVENOUS at 20:20

## 2021-10-12 ASSESSMENT — ENCOUNTER SYMPTOMS
CHILLS: 0
NAUSEA: 0
FEVER: 0
DIAPHORESIS: 1
MYALGIAS: 1
VOMITING: 0
COLOR CHANGE: 1

## 2021-10-12 ASSESSMENT — ACTIVITIES OF DAILY LIVING (ADL)
DIFFICULTY_COMMUNICATING: NO
PATIENT_/_FAMILY_COMMUNICATION_STYLE: SPOKEN LANGUAGE (ENGLISH OR BILINGUAL)

## 2021-10-12 NOTE — ED NOTES
Bed: ED07  Expected date: 10/12/21  Expected time: 2:58 PM  Means of arrival:   Comments:  Floors

## 2021-10-12 NOTE — LETTER
Transition Communication Hand-off for Care Transitions to Next Level of Care Provider    Name: Kareem Bai  : 1955  MRN #: [id number]  Primary Care Provider: Avis Travis     Primary Clinic: [facility] [address] [place] [address]     Reason for Hospitalization:  Diabetic ulcer of toe of left foot associated with type 2 diabetes mellitus, with fat layer exposed (H) [E11.621, L97.522]  Admit Date/Time: 10/12/2021  3:07 PM  Discharge Date: 10/15/2021    Payor Source: Payor: MEDICARE / Plan: MEDICARE / Product Type: Medicare /     Reason for Communication Hand-off Referral: Multiple providers/specialties    Discharge Plan: Patient is discharging home with [facility] for IV abx.        Concern for non-adherence with plan of care: No  Discharge Needs Assessment:  Already enrolled in Tele-monitoring program and name of program:  unknown     Follow-up specialty is recommended:yes  Follow-up plan:    Future Appointments   Date Time Provider Department Center   11/3/2021 12:00 PM Gerald Kat MD [facility]         Trini Angel RNCC    AVS/Discharge Summary is the source of truth; this is a helpful guide for improved communication of patient story

## 2021-10-12 NOTE — ED PROVIDER NOTES
"        [facility] EMERGENCY DEPARTMENT ([facility])    10/12/21        History     Chief Complaint   Patient presents with     Wound Infection     Dx DM2, for the past week \" end of the day my L foot gets swollen at the end of the day then the ff day its better\" Wnet to PMD today was advised to go to the ED L middle toe has an open area on the \"bottom of the foot\" Pt has neuropathy both feet     The history is provided by the patient and medical records.     Kareem Bai is a 66 year old male with a past medical history of diabetes mellitus and neuropathy in both feet who presents to this ED for evaluation of left middle toe swelling and redness.    Patient reports that he noticed that his left foot became swollen about 7 days ago. He notes that the swelling in the foot has slowly gotten worse over time. He states that the redness and swelling has been significantly worsening over the last 2 days. He says that he has noticed increased swelling and redness in the left foot along with a open wound on the third toe. He adds that he has no pain in the left foot secondary to neuropathy in his feet due to diabetes. Patient reports that his blood sugar on average is about 106 for the last week per his home glucometer. He states that last night that when he went to sleep, he had the windows open and a medium blanket on and he began sweating in bed, and states that sweating in bed is abnormal for him. Patient denies fever, chills, nausea, or vomiting. He also denies previous injury to the left foot. Patient currently denies taking antibiotics. Denies history of infection like this in the past. Patient adds that he is up to date on tetanus. Per [facility], patient's Tdap was last administered 2017.    Of note, patient reports that he initially visited his PCP located through [facility] clinic for left foot swelling. He states that the clinic referred him to this ED for further care. Patient adds that he is not " "currently seeing a podiatrists.     Past Medical History  Past Medical History:   Diagnosis Date     Hypertension      Neuropathy      Prediabetes      Sleep apnea      Past Surgical History:   Procedure Laterality Date     OPTICAL TRACKING SYSTEM ARTHROPLASTY KNEE Right 1/4/2018    Procedure: OPTICAL TRACKING SYSTEM ARTHROPLASTY KNEE;  Right Total Knee Arthroplasty;  Surgeon: Marcello Beatty MD;  Location:  OR     lisinopril-hydrochlorothiazide (PRINZIDE/ZESTORETIC) 20-25 MG per tablet  pravastatin (PRAVACHOL) 10 MG tablet  aspirin - buffered (ASCRIPTIN) 325 MG TABS tablet  gabapentin (NEURONTIN) 300 MG capsule  HYDROcodone-acetaminophen (NORCO) 5-325 MG per tablet  naproxen (NAPROSYN) 500 MG tablet      Allergies   Allergen Reactions     Ampicillin Other (See Comments) and Hives     Codeine GI Disturbance     LW Reaction: gi upset     Simvastatin Other (See Comments)     Family History  No family history on file.  Social History   Social History     Tobacco Use     Smoking status: Current Some Day Smoker     Types: Cigars     Smokeless tobacco: Never Used     Tobacco comment: 5-6/week   Substance Use Topics     Alcohol use: Yes     Comment: Drink every 2-3 days     Drug use: No      Past medical history, past surgical history, medications, allergies, family history, and social history were reviewed with the patient. No additional pertinent items.       Review of Systems   Constitutional: Positive for diaphoresis. Negative for chills and fever.   Gastrointestinal: Negative for nausea and vomiting.   Musculoskeletal: Positive for myalgias (left foot).   Skin: Positive for color change (redness of left foot).   All other systems reviewed and are negative.      Physical Exam   BP: (!) 152/78  Pulse: 66  Temp: 98.4  F (36.9  C)  Resp: 16  Height: 185.4 cm (6' 0.99\") (Taken 10/12/21 10:33 AM from [employer])  Weight: 82.6 kg (182 lb)  SpO2: 100 %  Physical Exam  Vitals and nursing note reviewed. "   Constitutional:       General: He is not in acute distress.     Appearance: Normal appearance. He is well-developed and normal weight. He is not ill-appearing or diaphoretic.   HENT:      Head: Normocephalic and atraumatic.      Nose: Nose normal.      Mouth/Throat:      Mouth: Mucous membranes are moist.   Eyes:      General: No scleral icterus.     Conjunctiva/sclera: Conjunctivae normal.   Cardiovascular:      Rate and Rhythm: Normal rate.   Pulmonary:      Effort: Pulmonary effort is normal. No respiratory distress.      Breath sounds: No stridor.   Abdominal:      General: There is no distension.   Musculoskeletal:         General: Swelling present. Normal range of motion.      Cervical back: Normal range of motion and neck supple. No rigidity.      Right lower leg: No edema.      Left lower leg: No edema.        Feet:       Comments: Distal capillary refill intact in all toes   Feet:      Left foot:      Skin integrity: Ulcer, erythema and warmth present.      Comments: Open ulceration with extruded adipose tissue at the tip of the left middle toe. Circumferential swelling and redness of middle toe with decreased ROM. Faint red streaking up lateral aspect of left foot.  Skin:     General: Skin is warm and dry.      Coloration: Skin is not jaundiced or pale.      Findings: Erythema present. No rash.   Neurological:      General: No focal deficit present.      Mental Status: He is alert and oriented to person, place, and time.      Sensory: Sensory deficit present.      Motor: No weakness.      Comments: Baseline neuropathy in bilateral feet   Psychiatric:         Mood and Affect: Mood is anxious.         Behavior: Behavior normal.         Thought Content: Thought content normal.                 ED Course       4:35 PM  The patient was seen and examined by Elizabeth Tate MD in Room ED07.     Procedures              Results for orders placed or performed during the hospital encounter of 10/12/21   XR Foot Left 3  Views     Status: None (Preliminary result)    Narrative    EXAM: XR FOOT LEFT G/E 3 VIEWS  LOCATION: [facility]  DATE/TIME: 10/12/2021 5:08 PM    INDICATION: Foot pain. Osteomyelitis suspected.    COMPARISON: None.      Impression    IMPRESSION:  1. There is cortical irregularity and a moderately displaced comminuted fracture of the distal tuft of the 3rd toe with overlying soft tissue swelling. The findings would be strong evidence of osteomyelitis in the appropriate clinical setting, but could   also be related to trauma.  2. No other significant findings.   Basic metabolic panel     Status: Abnormal   Result Value Ref Range    Sodium 138 133 - 144 mmol/L    Potassium 4.0 3.4 - 5.3 mmol/L    Chloride 105 94 - 109 mmol/L    Carbon Dioxide (CO2) 31 20 - 32 mmol/L    Anion Gap 2 (L) 3 - 14 mmol/L    Urea Nitrogen 13 7 - 30 mg/dL    Creatinine 0.76 0.66 - 1.25 mg/dL    Calcium 8.8 8.5 - 10.1 mg/dL    Glucose 87 70 - 99 mg/dL    GFR Estimate >90 >60 mL/min/1.73m2   CRP inflammation     Status: Abnormal   Result Value Ref Range    CRP Inflammation 43.0 (H) 0.0 - 8.0 mg/L   Erythrocyte sedimentation rate auto     Status: Normal   Result Value Ref Range    Erythrocyte Sedimentation Rate 16 0 - 20 mm/hr   CBC with platelets and differential     Status: None   Result Value Ref Range    WBC Count 7.7 4.0 - 11.0 10e3/uL    RBC Count 4.43 4.40 - 5.90 10e6/uL    Hemoglobin 13.6 13.3 - 17.7 g/dL    Hematocrit 40.7 40.0 - 53.0 %    MCV 92 78 - 100 fL    MCH 30.7 26.5 - 33.0 pg    MCHC 33.4 31.5 - 36.5 g/dL    RDW 12.9 10.0 - 15.0 %    Platelet Count 296 150 - 450 10e3/uL    % Neutrophils 64 %    % Lymphocytes 28 %    % Monocytes 6 %    % Eosinophils 1 %    % Basophils 1 %    % Immature Granulocytes 0 %    NRBCs per 100 WBC 0 <1 /100    Absolute Neutrophils 4.9 1.6 - 8.3 10e3/uL    Absolute Lymphocytes 2.2 0.8 - 5.3 10e3/uL    Absolute Monocytes 0.5 0.0 - 1.3 10e3/uL    Absolute  Eosinophils 0.1 0.0 - 0.7 10e3/uL    Absolute Basophils 0.0 0.0 - 0.2 10e3/uL    Absolute Immature Granulocytes 0.0 <=0.0 10e3/uL    Absolute NRBCs 0.0 10e3/uL   Extra Blue Top Tube     Status: None   Result Value Ref Range    Hold Specimen JIC    Extra Red Top Tube     Status: None   Result Value Ref Range    Hold Specimen JIC    CBC with platelets differential     Status: None    Narrative    The following orders were created for panel order CBC with platelets differential.  Procedure                               Abnormality         Status                     ---------                               -----------         ------                     CBC with platelets and d...[[id number]]                      Final result                 Please view results for these tests on the individual orders.   [place] Draw     Status: None (In process)    Narrative    The following orders were created for panel order [place] Draw.  Procedure                               Abnormality         Status                     ---------                               -----------         ------                     Extra Blue Top Tube[[id number]]                              Final result               Extra Red Top Tube[[id number]]                               Final result               Extra Green Top (Lithium...[[id number]]                                                   Please view results for these tests on the individual orders.     Medications   piperacillin-tazobactam (ZOSYN) 4.5 g vial to attach to  mL bag (has no administration in time range)   vancomycin (VANCOCIN) 1000 mg in dextrose 5% 200 mL PREMIX (has no administration in time range)   vancomycin (VANCOCIN) 1000 mg in dextrose 5% 200 mL PREMIX (has no administration in time range)        Assessments & Plan (with Medical Decision Making)   Kareem Bai is a 66 year old male with a past medical history of diabetes mellitus and neuropathy in both feet who presents to  this ED for evaluation of left middle toe swelling and redness.    Ddx: diabetic ulcer, cellulitis, osteomyelitis, bacteremia    Patient denies pain as he has neuropathy.  Labs notable for CRP of 43 and normal ESR.  White count normal.  Normal hemoglobin.  Normal electrolytes and creatinine.  X-ray shows cortical irregularity and moderately displaced comminuted fracture of the distal tuft of the third toe with overlying soft tissue swelling.  Clinically distant with osteomyelitis as the patient cannot recall a trauma.  Wound culture sent from the open wound on the tip of the left middle toe.  Blood cultures obtained.  Patient started on vancomycin and Zosyn.  Admitted to medicine for ongoing management.          I have reviewed the nursing notes. I have reviewed the findings, diagnosis, plan and need for follow up with the patient.    New Prescriptions    No medications on file       Final diagnoses:   Diabetic ulcer of toe of left foot associated with type 2 diabetes mellitus, with fat layer exposed (H)     I, Rupinder Childers, am serving as a trained medical scribe to document services personally performed by Elizabeth Tate MD, based on the provider's statements to me.      IElizabeth MD, was physically present and have reviewed and verified the accuracy of this note documented by Rupinder Childers.    --  Elizabeth Tate MD  [facility] EMERGENCY DEPARTMENT  10/12/2021     Elizabeth Tate MD  10/12/21 1821

## 2021-10-12 NOTE — LETTER
[facility] [address]  [address]  [facility] [address]  Phone: [phone]  Fax: [phone]    October 15, 2021        Kareem Bai  [address] [facility] [address]          To whom it may concern:    RE: Kareem Bai was admitted to the hospital on 10/12/2021 and will be discharged today 10/15/2021. He will need to be non-weight bearing on his left foot except for heel weight bearing  for transfers and bathroom until follow up with podiatry in 1-2 weeks at which time these recommendations may change. He has been ordered a DH2 shoe and Roll-A-Bout to facilitate his movement with these restrictions.        Sincerely,    Ama Reyes MD  Attending Physician

## 2021-10-12 NOTE — H&P
[facility]    History and Physical - Hospitalist Service       Date of Admission:  10/12/2021    Assessment & Plan      Kareem Bai is a 66 year old male with a history of DM2, diabetic neuropathy, HTN, HLD, who was admitted with an infected wound of the left third toe with associated ostemyelitis.     Infected diabetic ulcer of left third toe with osteomyelitis  Diabetic neuropathy  Patient presenting with 7 days of worsening toe redness and swelling. No known trauma. XR with cortical irregularity and moderately displaced comminuted fracture of distal 3rd toe consistent with osteomyelitis. Afebrile and hemodynamically stable.    - Orthopedic Surgery consult. Discussed with Orthopedics resident, XR is consistent w/osteomyelitis and do not need MRI.    - Infectious Disease consult   - IV vancomycin, Zosyn   - Pain: Tylenol PRN, PTA gabapentin   - Blood cultures x2 and wound culture pending   - CBC in AM    DM2: A1C 5.7% in June. Controlled with lifestyle modifications, not on hypoglycemic medications at this time.    - LSSI for now    Hypertension: PTA on lisinopril-hydrochlorothiazide 20-25 0.5 tablets daily. Pt reports taking 1 full tablet daily x5 days PTA in hopes that this would improve his foot swelling.   - Continue PTA lisinopril-hydrochlorothiazide 0.5 tablets daily, up-titrate as needed    HLD: Continue PTA pravastatin.            Diet:   Regular  DVT Prophylaxis: Mechanical only for now while awaiting Orthopedic Surgery recs  Santos Catheter: Not present  Central Lines: None  Code Status:   FULL    Clinically Significant Risk Factors Present on Admission              # Platelet Defect: home medication list includes an antiplatelet medication      Disposition Plan   Expected discharge:  2 - 5 days recommended to prior living arrangement once clinically improved, antibiotic plan in place.     The patient's care was discussed with the Attending Physician,   Sean and Patient.    Myesha Barraza PA-C  [facility]  Securely message with the [employer] Web Console (learn more here)  Text page via [facility] Paging/Directory      ______________________________________________________________________    Chief Complaint   Toe redness and swelling    History is obtained from the patient    History of Present Illness   Kareem Bai is a 66 year old male with a history of DM2, diabetic neuropathy, HTN, HLD, who was admitted with an infected wound of the left third toe.  He started having swelling of his left third toe about 7 days ago.  Initially the swelling would improve throughout the day.  However, over the last 2 days he has noticed significant worsening of the swelling and redness.  He has also noticed some redness along the side of his left foot.  He denies any pain and notes he does have a history of neuropathy so has reduced sensation in both of his feet.  He denies any recent trauma to his feet.  He was not aware of any open wounds.  He notes that he walked through a swampy area about a week ago and his feet got very wet.  He presented to his primary care office today for evaluation and they sent him to the ED for further evaluation.  He has not had any fevers at home.    He reports good diabetic control.  His blood sugars have been in the low 100s at home.  He is not on any hypoglycemic medications at this time.  He has been on Metformin in the past.  His diabetes is well controlled with diet and exercise.    He has been taking a full tablet of his lisinopril hydrochlorothiazide for the past 5 days in hopes that this may help with his toe swelling.    X-ray in the ER was concerning for osteomyelitis.  He was started on IV Zosyn and vancomycin.  Wound and blood cultures were obtained.          Review of Systems    The 10 point Review of Systems is negative other than noted in the HPI or here.     Past Medical  History    I have reviewed this patient's medical history and updated it with pertinent information if needed.   Past Medical History:   Diagnosis Date     Hypertension      Neuropathy      Prediabetes      Sleep apnea        Past Surgical History   I have reviewed this patient's surgical history and updated it with pertinent information if needed.  Past Surgical History:   Procedure Laterality Date     OPTICAL TRACKING SYSTEM ARTHROPLASTY KNEE Right 1/4/2018    Procedure: OPTICAL TRACKING SYSTEM ARTHROPLASTY KNEE;  Right Total Knee Arthroplasty;  Surgeon: Marcello Beatty MD;  Location:  OR       Social History   I have reviewed this patient's social history and updated it with pertinent information if needed.  Social History     Tobacco Use     Smoking status: Current Some Day Smoker     Types: Cigars     Smokeless tobacco: Never Used     Tobacco comment: 5-6/week   Substance Use Topics     Alcohol use: Yes     Comment: Drink every 2-3 days     Drug use: No       Family History     No significant family history.    Prior to Admission Medications   Prior to Admission Medications   Prescriptions Last Dose Informant Patient Reported? Taking?   HYDROcodone-acetaminophen (NORCO) 5-325 MG per tablet   No No   Sig: Take 1-2 tablets by mouth every 4 hours as needed for moderate to severe pain   aspirin - buffered (ASCRIPTIN) 325 MG TABS tablet   No No   Sig: Take 1 tablet (325 mg) by mouth daily   gabapentin (NEURONTIN) 300 MG capsule   Yes No   Sig: TAKE 1 CAPSULE BY MOUTH THREE TIMES DAILY   lisinopril-hydrochlorothiazide (PRINZIDE/ZESTORETIC) 20-25 MG per tablet 10/12/2021 at Unknown time  Yes Yes   Sig: Take 1 tablet by mouth daily    naproxen (NAPROSYN) 500 MG tablet   Yes No   pravastatin (PRAVACHOL) 10 MG tablet 10/12/2021 at Unknown time  Yes Yes   Sig: TAKE ONE TABLET BY MOUTH AT BEDTIME      Facility-Administered Medications: None     Allergies   Allergies   Allergen Reactions     Ampicillin Other (See  Comments) and Hives     Codeine GI Disturbance     LW Reaction: gi upset     Simvastatin Other (See Comments)       Physical Exam   Vital Signs: Temp: 97.7  F (36.5  C) Temp src: Oral BP: (!) 152/81 Pulse: 56   Resp: 16 SpO2: 97 % O2 Device: None (Room air)    Weight: 182 lbs 0 oz    Constitutional: Awake and alert, in no apparent distress. Lying comfortably in bed.   Eyes: Sclera clear, anicteric   Respiratory: Breathing non-labored.   Cardiovascular: Bradycardic, regular rhythm. No rubs or murmurs. Intact bilateral pedal pulses.   GI: Soft, non-tender, non-distended. No palpable masses or hepatomegaly. Normoactive bowel sounds.   Extremities: Left third toe warm and erythematous with open ulceration with exposed adipose tissue on the plantar aspect of the distal 3rd toe. Erythematous streaking up the lateral aspect of the left foot with associated edema.   Skin:  Good color. No jaundice.  Neurologic: Alert and fully oriented. No focal deficits.     Data   Data reviewed today: I reviewed all medications, new labs and imaging results over the last 24 hours.     ROUTINE IP LABS (Last four results)  Recent Labs   Lab 10/12/21  1655      POTASSIUM 4.0   CHLORIDE 105   CO2 31   ANIONGAP 2*   GLC 87   BUN 13   CR 0.76   BELGICA 8.8     Recent Labs   Lab 10/12/21  1655   WBC 7.7   RBC 4.43   HGB 13.6   HCT 40.7   MCV 92   MCH 30.7   MCHC 33.4   RDW 12.9        No lab results found in last 7 days.

## 2021-10-12 NOTE — PHARMACY-VANCOMYCIN DOSING SERVICE
"Pharmacy Vancomycin Initial Note  Date of Service 2021  Patient's  1955  66 year old, male    Indication: Osteomyelitis    Current estimated CrCl = Estimated Creatinine Clearance: 110.2 mL/min (based on SCr of 0.76 mg/dL).    Creatinine for last 3 days  10/12/2021:  4:55 PM Creatinine 0.76 mg/dL    Recent Vancomycin Level(s) for last 3 days  No results found for requested labs within last 72 hours.      Vancomycin IV Administrations (past 72 hours)      No vancomycin orders with administrations in past 72 hours.                Nephrotoxins and other renal medications (From now, onward)    Start     Dose/Rate Route Frequency Ordered Stop    10/12/21 170  piperacillin-tazobactam (ZOSYN) 4.5 g vial to attach to  mL bag     Note to Pharmacy: For [facility], [facility] and [facility]: For Zosyn-naive patients, use the \"Zosyn initial dose + extended infusion\" order panel.    4.5 g  over 30 Minutes Intravenous EVERY 6 HOURS 10/12/21 170            Contrast Orders - past 72 hours (72h ago, onward)    None          Loading dose: N/A  Regimen: 1000 mg IV every 12 hours.  Start time: 17:41 on 10/12/2021  Exposure target: AUC24 (range)400-600 mg/L.hr   AUC24,ss: 451 mg/L.hr  Probability of AUC24 > 400: 63 %  Ctrough,ss: 14 mg/L  Probability of Ctrough,ss > 20: 21 %  Probability of nephrotoxicity (Lodise ARIE ): 9 %        Plan:  1. Start vancomycin  1000 mg IV q12h.   2. Vancomycin monitoring method: AUC  3. Vancomycin therapeutic monitoring goal: 400-600 mg*h/L  4. Pharmacy will check vancomycin levels as appropriate in 1-3 Days.    5. Serum creatinine levels will be ordered daily for the first week of therapy and at least twice weekly for subsequent weeks.      Nicollette McMann, PharmD  [facility]  Emergency Department: Ascom *[phone]  "

## 2021-10-13 ENCOUNTER — HOME INFUSION (PRE-WILLOW HOME INFUSION) (OUTPATIENT)
Dept: PHARMACY | Facility: CLINIC | Age: 66
End: 2021-10-13

## 2021-10-13 LAB
ANION GAP SERPL CALCULATED.3IONS-SCNC: 3 MMOL/L (ref 3–14)
BUN SERPL-MCNC: 18 MG/DL (ref 7–30)
CALCIUM SERPL-MCNC: 8.1 MG/DL (ref 8.5–10.1)
CHLORIDE BLD-SCNC: 110 MMOL/L (ref 94–109)
CO2 SERPL-SCNC: 27 MMOL/L (ref 20–32)
CREAT SERPL-MCNC: 0.82 MG/DL (ref 0.66–1.25)
ERYTHROCYTE [DISTWIDTH] IN BLOOD BY AUTOMATED COUNT: 12.7 % (ref 10–15)
GFR SERPL CREATININE-BSD FRML MDRD: >90 ML/MIN/1.73M2
GLUCOSE BLD-MCNC: 98 MG/DL (ref 70–99)
GLUCOSE BLDC GLUCOMTR-MCNC: 106 MG/DL (ref 70–99)
GLUCOSE BLDC GLUCOMTR-MCNC: 111 MG/DL (ref 70–99)
GLUCOSE BLDC GLUCOMTR-MCNC: 113 MG/DL (ref 70–99)
GLUCOSE BLDC GLUCOMTR-MCNC: 116 MG/DL (ref 70–99)
GLUCOSE BLDC GLUCOMTR-MCNC: 128 MG/DL (ref 70–99)
GRAM STAIN RESULT: ABNORMAL
GRAM STAIN RESULT: ABNORMAL
HCT VFR BLD AUTO: 35.9 % (ref 40–53)
HGB BLD-MCNC: 11.7 G/DL (ref 13.3–17.7)
MCH RBC QN AUTO: 29.6 PG (ref 26.5–33)
MCHC RBC AUTO-ENTMCNC: 32.6 G/DL (ref 31.5–36.5)
MCV RBC AUTO: 91 FL (ref 78–100)
PLATELET # BLD AUTO: 260 10E3/UL (ref 150–450)
POTASSIUM BLD-SCNC: 4.5 MMOL/L (ref 3.4–5.3)
RBC # BLD AUTO: 3.95 10E6/UL (ref 4.4–5.9)
SODIUM SERPL-SCNC: 140 MMOL/L (ref 133–144)
WBC # BLD AUTO: 6 10E3/UL (ref 4–11)

## 2021-10-13 PROCEDURE — 80048 BASIC METABOLIC PNL TOTAL CA: CPT | Performed by: PHYSICIAN ASSISTANT

## 2021-10-13 PROCEDURE — 99222 1ST HOSP IP/OBS MODERATE 55: CPT | Mod: 24 | Performed by: INTERNAL MEDICINE

## 2021-10-13 PROCEDURE — 99232 SBSQ HOSP IP/OBS MODERATE 35: CPT | Performed by: HOSPITALIST

## 2021-10-13 PROCEDURE — 36415 COLL VENOUS BLD VENIPUNCTURE: CPT | Performed by: PHYSICIAN ASSISTANT

## 2021-10-13 PROCEDURE — 250N000011 HC RX IP 250 OP 636: Performed by: PHYSICIAN ASSISTANT

## 2021-10-13 PROCEDURE — 85027 COMPLETE CBC AUTOMATED: CPT | Performed by: PHYSICIAN ASSISTANT

## 2021-10-13 PROCEDURE — 250N000013 HC RX MED GY IP 250 OP 250 PS 637: Performed by: PHYSICIAN ASSISTANT

## 2021-10-13 PROCEDURE — 258N000003 HC RX IP 258 OP 636

## 2021-10-13 PROCEDURE — 87205 SMEAR GRAM STAIN: CPT | Performed by: INTERNAL MEDICINE

## 2021-10-13 PROCEDURE — 250N000011 HC RX IP 250 OP 636: Performed by: HOSPITALIST

## 2021-10-13 PROCEDURE — 120N000002 HC R&B MED SURG/OB UMMC

## 2021-10-13 RX ORDER — SODIUM CHLORIDE 9 MG/ML
INJECTION, SOLUTION INTRAVENOUS
Status: COMPLETED
Start: 2021-10-13 | End: 2021-10-13

## 2021-10-13 RX ADMIN — PIPERACILLIN SODIUM AND TAZOBACTAM SODIUM 4.5 G: 4; .5 INJECTION, POWDER, LYOPHILIZED, FOR SOLUTION INTRAVENOUS at 02:14

## 2021-10-13 RX ADMIN — PRAVASTATIN SODIUM 10 MG: 10 TABLET ORAL at 08:30

## 2021-10-13 RX ADMIN — PIPERACILLIN SODIUM AND TAZOBACTAM SODIUM 4.5 G: 4; .5 INJECTION, POWDER, LYOPHILIZED, FOR SOLUTION INTRAVENOUS at 08:00

## 2021-10-13 RX ADMIN — VANCOMYCIN HYDROCHLORIDE 1000 MG: 1 INJECTION, SOLUTION INTRAVENOUS at 21:12

## 2021-10-13 RX ADMIN — LISINOPRIL AND HYDROCHLOROTHIAZIDE 1 TABLET: 12.5; 1 TABLET ORAL at 08:30

## 2021-10-13 RX ADMIN — PIPERACILLIN SODIUM AND TAZOBACTAM SODIUM 4.5 G: 4; .5 INJECTION, POWDER, LYOPHILIZED, FOR SOLUTION INTRAVENOUS at 13:38

## 2021-10-13 RX ADMIN — PIPERACILLIN SODIUM AND TAZOBACTAM SODIUM 4.5 G: 4; .5 INJECTION, POWDER, LYOPHILIZED, FOR SOLUTION INTRAVENOUS at 20:23

## 2021-10-13 RX ADMIN — SODIUM CHLORIDE 500 ML: 9 INJECTION, SOLUTION INTRAVENOUS at 20:24

## 2021-10-13 RX ADMIN — ENOXAPARIN SODIUM 40 MG: 40 INJECTION SUBCUTANEOUS at 17:02

## 2021-10-13 RX ADMIN — VANCOMYCIN HYDROCHLORIDE 1000 MG: 1 INJECTION, SOLUTION INTRAVENOUS at 08:54

## 2021-10-13 NOTE — CONSULTS
[facility] Orthopedic Consultation    Kareem Bai MRN# [id number]   Age: 66 year old YOB: 1955   Date of Admission:  10/12/2021    Reason for consult: Diabetic foot wound/osteomyelitis   Requesting physician: Myesha Barraza PA-C              Impression and Recommendation:   Impression:  Kareem Bai is a 66 year old male with DM2, peripheral neuropathy who has been admitted for evaulation and management of a new diabetic ulcer involving the left third toe. Patient is currently not septic and no need for emergent surgical intervention.    Recomendations:  Plan for bedside debridement of callous tomorrow. No plans for operative intervention.    Activity: NWB LLE while wound heals. ROM as tolerated.   Splint: Recommend use of DH2/surgical shoe on affected extremity  Wound Cares/Dressing: Recommend iodosorb with sterile gauze dressing, to be changed by RN daily.   DVT PPx: Per primary.  Antibiotics: Recommend broad spectrum IV antibiotic for infected ulcer; once clinical picture improves, transition to oral antibiotics  Cultures: Follow wound and blood cultures  Labs: Follow inflammatory labs; repeat WBC, CRP and PCT q 48 hours until trending down. Recommend ordering these labs: CRP, A1C, albumin, prealbumin  Imaging: No further imaging needed at this time. Recommend against advanced imaging at this time.  PT/OT: Work on strengthening, gait training, mobility, and ADLs  Consults: Consider infectious disease consult to aid in antibiotic selection/duration. Recommend nutrition consult to eval for nutritional deficiency and consideration of protein/vitamin/mineral supplementation to improve wound healing potential.  Dispo: Per Primary team. No current plans for OR.  Follow Up: Follow up in clinic with Dr. Lua in 1-2 weeks after discharge.     Orthopaedics will follow this patient peripherally at this time after debridement. Please call or page me or the on-call resident with any  concerns or questions.         Chief Complaint:   Diabetic foot ulcer, left 3rd toe         History of Present Illness:   Juan Manuel Bai is a 66 year old male with DMII, peripheral neuropathy, HTN, HLD who presents with new diabetic foot ulcer involving the 3rd toe of the left foot. He reports swelling of the 3rd toe for the past week. Initially, his swelling his would improve throughout the day, but he has had worsening swelling and erythema over the past 2 days. He presented to his PCP this AM and was referred to the ED. The patient was unaware of any open wounds. No reported trauma, although he reports walking through a swampy area a week ago and his feet got very wet. No fevers or chills at home.     The patient was started on IV Zosyn and vancomycin in the ED. Wound and blood cultures were obtained.    Glycemic Control:  Patient reports good compliance with DM medications. Currently controlled with diet and exercise. Most recent Hgb A1C was 7.9 in 2019.    Wound History:  No trauma, no previous treatments or antibiotics for the left 3rd toe. Has had prior wounds after shaving his callouses but has not had issues with wound healing previously.       History obtained from patient interview and chart review.        Past Medical History:     Past Medical History:   Diagnosis Date     Hypertension      Neuropathy      Prediabetes      Sleep apnea              Past Surgical History:     Past Surgical History:   Procedure Laterality Date     OPTICAL TRACKING SYSTEM ARTHROPLASTY KNEE Right 1/4/2018    Procedure: OPTICAL TRACKING SYSTEM ARTHROPLASTY KNEE;  Right Total Knee Arthroplasty;  Surgeon: Marcello Beatty MD;  Location:  OR             Social History:   Smokes cigars  2-3 drinks/daily           Allergies:     Allergies   Allergen Reactions     Ampicillin Other (See Comments) and Hives     Codeine GI Disturbance     LW Reaction: gi upset     Simvastatin Other (See Comments)             Medications:   Medication  "reviewed with patient and in chart.  Anticoagulation: None  Antibiotics: None, started on vancomycin and zosyn in the ED          Review of Systems:   10 point ROS conducted and negative unless otherwise noted above.           Physical Exam:     BP (!) 154/81   Pulse 54   Temp 97.1  F (36.2  C) (Oral)   Resp 16   Ht 1.854 m (6' 0.99\")   Wt 82.6 kg (182 lb)   SpO2 100%   BMI 24.02 kg/m    General: awake, alert, cooperative, no apparent distress, appears stated age  HEENT: normocephalic, atraumatic, PERRL, EOMI, no scleral icterus, MMM  Respiratory: breathing non-labored, no wheezing  Cardiovascular: peripheral pulses 2+ and symmetric, capillary refill < 2sec, skin wwp  Skin: no rashes or lesions  Neurological: A&Ox3, CN II-XII grossly intact  Musculoskeletal:  LLE: No gross deformity. There is a foot wound located at the base of the 3rd toe which measures 1mm x 1mm surrounded by hyperkeratotic tissue. Would classify as Paulino 3 (based on osteomyelitis). There is erythema extending down to the base of the 3rd toe. No fluctuance or expressible purulence. Serosanguinous drainage can be expressed.  Probing wound reveals a tract down ~10mm, likely down to bone. Sensation diminished in stocking pattern distribution in BLE which normal sensation return at level of top of the foot. 5/5 SLR. Fires TA/Gastroc/EHL/FHL with 5/5 strength. Dorsalis pedis and posterior tibial arteries are palpable 2+.                      Imaging:   Review of left foot films from 10/12/2021 demonstrate extensive erosions in the 3rd distal phalanx suggestive of osteomyelitis.          Laboratory date:   CBC:  Lab Results   Component Value Date    WBC 7.7 10/12/2021    HGB 13.6 10/12/2021     10/12/2021       BMP:  Lab Results   Component Value Date     10/12/2021    POTASSIUM 4.0 10/12/2021    CHLORIDE 105 10/12/2021    CO2 31 10/12/2021    BUN 13 10/12/2021    CR 0.76 10/12/2021    ANIONGAP 2 (L) 10/12/2021    BELGICA 8.8 10/12/2021 "    GLC 87 10/12/2021       Inflammatory Markers:  Lab Results   Component Value Date    WBC 7.7 10/12/2021    CRP 43.0 (H) 10/12/2021    SED 16 10/12/2021       Cultures:  No results for input(s): CULT in the last 168 hours.    Suad Cyr MD  Orthopaedic Surgery PGY4      Attestation:  This patient was discussed with Dr. Lua who agrees with the above assessment and plan.

## 2021-10-13 NOTE — CONSULTS
GENERAL INFECTIOUS DISEASES CONSULTATION     Patient:  Kareem Bai   Date of birth 1955, Medical record number [id number]  Date of Visit:  10/13/2021  Date of Admission: 10/12/2021  Consult Requested by: Jerson Estrada, *  Reason for Consult:  infected diabetic ulcer with associated osteomyelitis of left third toe         Assessment and Recommendations:     Kareem Bai is a 66 year old male with medical history significant for diet controlled Diabetes mellitus2 HbA1c 5.7( 6/11/21) with peripheral neuropathy, HTN, HLD, arthritis, Right TKA (2018), occasional cigar smoker, sleep apnea, who was admitted on 10/12/21 with infected left third toe.      ASSESSMENT:  1. Left 3rd toe draining wound with osteomyelitis on Xray 10/12/21  Xray of left foot 10/12/21  IMPRESSION:  There is cortical irregularity and a moderately displaced comminuted fracture of the distal tuft of the 3rd toe with overlying soft tissue swelling. The findings would be strong evidence of osteomyelitis in the appropriate clinical setting, but could also be related to trauma. No other significant findings.    - CRP 43 , WBC 7.7, ESR 16 (10/12/21)   2. Diet and exercise controlled Diabetes mellitus HbA1c 5.7( 6/11/21) with peripheral neuropathy,  3. Hypertension   4. Hyperlipidemia   5. Right TKA in 2018   6. Occasional cigar smoker   7. Right foot with decreased pedal pulses , cool to touch   8. LAURA    RECOMMENDATION:  - continue Pip/Tazobactam and Vancomycin IV for now , adjust per culture results  - follow-up wound and blood cultures   - will need out patient podiatry follow-up . no need for emergent surgery.      ID will follow and further recommendations to follow     Thank you for allowing us to participate in the care of this patient    Gerald Kat MD, M.Med.Sc  Staff, Infectious Diseases   Pager : [phone]         History of Present Illness:     Kareem Bai is a 66 year old male with medical  history significant for diet controlled Diabetes mellitus2 HbA1c 5.7( 6/11/21) with peripheral neuropathy, HTN, HLD, Right TKA (2018), occasional cigar smoker , sleep apnea, who was admitted on 10/12/21 with infected left third toe.  He started noticing swelling and redness of the left 3rd toe about 7 days prior to admission, then it would go down the next days and started to get redder and more swollen 2 days before he presented to ER. He had nightsweats the night before he came in. He denies any fever or chills at home. He denies any trauma or injury to the foot. He has decreased sensation in the feet. He did not check his feet on a regular basis. He denies noticing any bleeding or discharge from the toe. He walked through a swampy area about 1 week ago , and his shoes are not water resistant,  before noticing redness/ swelling in that toe. He denies wearing new foot wear but always has some problems with his shoes, being loose with some friction with walking. walks 2 dogs on a regular basis. Right great toe- with a callous, with occasional bleeding    He does not have a PCP. of note, he presented to ER on 9/8/21 with fatigue and weight loss. Has lost about 35 lbs/ in 1 year. He has good appetite but watches his diet closely and exercises regularly.  his diabetes is well controlled with HbA1c 5.7 on 6/11/21. He says his energy level has improved since then.     CRP 43 , WBC 7.7, ESR 16 (10/12/21)   SARSCoV2 PCR - neg on 10/12/21     Xray of left footshowed cortical irregularity and a moderately displaced comminuted fracture of the distal tuft of the 3rd toe with overlying soft tissue swelling. The findings would be strong evidence of osteomyelitis in the appropriate clinical setting, but could also be related to trauma. No other significant findings.     Cultures are pending. He is currently on Pip/Tazobactam and Vancomycin IV and seems to tolerate the antibiotics well.     Imaging:   Xray of left foot  10/12/21  IMPRESSION:  1. There is cortical irregularity and a moderately displaced comminuted fracture of the distal tuft of the 3rd toe with overlying soft tissue swelling. The findings would be strong evidence of osteomyelitis in the appropriate clinical setting, but could also be related to trauma.  2. No other significant findings.          Review of Systems:   CONSTITUTIONAL:  No fevers or chills  EYES: negative for icterus  ENT:  negative for hearing loss, tinnitus and sore throat  RESPIRATORY:  negative for cough with sputum and dyspnea  CARDIOVASCULAR:  negative for chest pain, dyspnea  GASTROINTESTINAL:  negative for nausea, vomiting, diarrhea and constipation  GENITOURINARY:  negative for dysuria  HEME:  No easy bruising  INTEGUMENT:  see HPI   NEURO:  Negative for headache         Past Medical History:     Past Medical History:   Diagnosis Date     Hypertension      Neuropathy      Prediabetes      Sleep apnea           Past Surgical History:     Past Surgical History:   Procedure Laterality Date     OPTICAL TRACKING SYSTEM ARTHROPLASTY KNEE Right 1/4/2018    Procedure: OPTICAL TRACKING SYSTEM ARTHROPLASTY KNEE;  Right Total Knee Arthroplasty;  Surgeon: Marcello Beatty MD;  Location: [facility] OR          Family History:   reviewed, non contributory         Social History:     Social History     Tobacco Use     Smoking status: Current Some Day Smoker     Types: Cigars     Smokeless tobacco: Never Used     Tobacco comment: 5-6/week   Substance Use Topics     Alcohol use: Yes     Comment: Drink every 2-3 days     History   Sexual Activity     Sexual activity: Not on file     lives in [place] with wife, takes care of 2 dogs , that belong to his son.   retired postman  occasional alcohol and cifgar smoker          Current Medications (antimicrobials listed in bold):       insulin aspart  1-3 Units Subcutaneous TID AC     insulin aspart  1-3 Units Subcutaneous At Bedtime     lisinopril-hydrochlorothiazide   "1 tablet Oral Daily     piperacillin-tazobactam  4.5 g Intravenous Q6H     pravastatin  10 mg Oral Daily     sodium chloride (PF)  3 mL Intracatheter Q8H     vancomycin  1,000 mg Intravenous Q12H          Allergies:     Allergies   Allergen Reactions     Ampicillin Other (See Comments) and Hives     Codeine GI Disturbance     LW Reaction: gi upset     Simvastatin Other (See Comments)          Physical Exam:   Vitals were reviewed  Patient Vitals for the past 24 hrs:   BP Temp Temp src Pulse Resp SpO2 Height Weight   10/12/21 2219 113/57 98.1  F (36.7  C) Oral 60 16 100 % -- --   10/12/21 2047 (!) 154/81 97.1  F (36.2  C) Oral 54 16 -- -- --   10/1955 115/69 98.2  F (36.8  C) Oral 54 16 100 % -- --   10/12/21 1700 -- -- -- -- -- -- 1.854 m (6' 0.99\") --   10/12/21 1418 (!) 152/81 97.7  F (36.5  C) Oral 56 16 97 % -- 82.6 kg (182 lb)   10/12/21 1224 (!) 152/78 98.4  F (36.9  C) Oral 66 16 100 % -- --     Ranges for his vital signs:  Temp:  [97.1  F (36.2  C)-98.4  F (36.9  C)] 98.1  F (36.7  C)  Pulse:  [54-66] 60  Resp:  [16] 16  BP: (113-154)/(57-81) 113/57  SpO2:  [97 %-100 %] 100 %    Physical Examination:  GENERAL:  well-developed, well-nourished, in bed in no acute distress. wearing a face mask   HEENT:  Head is normocephalic, atraumatic   EYES:  Eyes have anicteric sclerae without conjunctival injection   NECK:  Supple. No  Cervical lymphadenopathy  LUNGS:  Clear to auscultation bilateral.   CARDIOVASCULAR:  Regular rate and rhythm with no murmurs, gallops or rubs.  ABDOMEN:  Normal bowel sounds, soft, nontender. No appreciable hepatosplenomegaly  SKIN:  Left 3rd toe - red and mildly swollen. tip of toe- draining small amount of serosanguinous fluid. neuropathy - decreased sensation. no rashes else where  Extremities: no edema in lower extremities. good pedal pulses in left foot, warm to touch. Right foot with decreased pedal pulses, cool to touch. right great toe with dark, callous  . arthritic toes.   " right TKA . no signs of infection of knee/ ankle joints  PIV Line are in place without any surrounding erythema or exudate. No stigmata of endocarditis.  NEUROLOGIC:  Grossly nonfocal. Active x4 extremities         Laboratory Data:     Inflammatory Markers    Recent Labs   Lab Test 10/12/21  1655   SED 16   CRP 43.0*     Hematology Studies    Recent Labs   Lab Test 10/13/21  0618 10/12/21  1655 09/08/21  1533 01/06/18  0557 01/05/18  0653 01/04/18  0556   WBC 6.0 7.7 6.8  --   --   --    HGB 11.7* 13.6 12.4* 11.6* 13.1* 14.8   MCV 91 92 91  --   --   --     296 275  --   --   --      Immune Globulin Studies    Recent Labs   Lab Test 03/15/19  0915      IGM 38*        Metabolic Studies     Recent Labs   Lab Test 10/13/21  0618 10/12/21  1655 09/08/21  1533 10/29/19  0950    138 140 139   POTASSIUM 4.5 4.0 3.8 4.4   CHLORIDE 110* 105 107 106   CO2 27 31 30 29   BUN 18 13 23 14   CR 0.82 0.76 0.77 0.79   GFRESTIMATED >90 >90 >90 >90     Hepatic Studies    Recent Labs   Lab Test 09/08/21  1533   BILITOTAL 0.7   ALKPHOS 53   ALBUMIN 3.3*   AST 15   ALT 24     Thyroid Studies    Recent Labs   Lab Test 03/15/19  0915   TSH 2.42     Urine Studies    Recent Labs   Lab Test 09/08/21  1544 01/04/18  0600   LEUKEST Negative Trace*   WBCU 1 <1

## 2021-10-13 NOTE — PLAN OF CARE
Patient declined offer of Lovenox injection as he wanted to speak with the doctor more regarding information on the drug.  Has not received the injection in the past.

## 2021-10-13 NOTE — PROGRESS NOTES
Therapy: IV abx   Insurance: [facility] [employer]       Pt has 100% coverage for IV abx through [facility] [employer] plan.       In reference to admission on 10/12/21 to check IV abx coverage    Please contact Intake with any questions, [phone] or In Basket pool, [facility] ([phone]).

## 2021-10-13 NOTE — PROGRESS NOTES
"[facility], [place]   Internal Medicine Daily Note           Interval History/Events     Patient seen and examined.  No events overnight.  He reports 1 week of end of day redness and swelling of his third left toe which would resolve by morning but persisted this Sunday.   He does not have much pain due to.        Review of Systems        4 point ROS including Respiratory, CV, GI and , other than that noted above is negative      Medications   I have reviewed current medications  in the \"current medication\" section of Epic.  Relevant changes include:     Physical Exam       Vital signs:    Blood pressure 124/68, pulse (!) 49, temperature 97.1  F (36.2  C), temperature source Oral, resp. rate 16, height 1.854 m (6' 0.99\"), weight 82.6 kg (182 lb), SpO2 98 %.  Estimated body mass index is 24.02 kg/m  as calculated from the following:    Height as of this encounter: 1.854 m (6' 0.99\").    Weight as of this encounter: 82.6 kg (182 lb).    No intake or output data in the 24 hours ending 10/13/21 1224   Gen: Well Appearing. In NAD.  HEENT: NC/AT, EOMI  Neck: Supple  CV: Normal S1S2, Regular rhythm, normal rate  Lungs: Clear to ascultation b/l  Abd: Soft, NT/ND, +BS  Extremties:  See ortho note for images of affected left third toe. No LE Edema b/l   Neurologic: AAOx3     Laboratory and Imaging Studies     I have reviewed  laboratory and imaging studies in the Epic. Pertinent findings are as below:    BMP  Recent Labs   Lab 10/13/21  0822 10/13/21  0618 10/13/21  0213 10/12/21  2215 10/12/21  1655   NA  --  140  --   --  138   POTASSIUM  --  4.5  --   --  4.0   CHLORIDE  --  110*  --   --  105   BELGICA  --  8.1*  --   --  8.8   CO2  --  27  --   --  31   BUN  --  18  --   --  13   CR  --  0.82  --   --  0.76   * 98 116* 184* 87     CBC  Recent Labs   Lab 10/13/21  0618 10/12/21  1655 10/12/21  1655   WBC 6.0  --  7.7   RBC 3.95*  --  4.43   HGB 11.7*   < > 13.6   HCT 35.9*  --  40.7   MCV " 91  --  92   MCH 29.6  --  30.7   MCHC 32.6  --  33.4   RDW 12.7  --  12.9     --  296    < > = values in this interval not displayed.     INRNo lab results found in last 7 days.  LFTsNo lab results found in last 7 days.   PANCNo lab results found in last 7 days.        Impression/Plan      Kareem Bai is a 66 year old male with a history of DM2, diabetic neuropathy, HTN, HLD, who was admitted with an infected wound of the left third toe with associated ostemyelitis.      Infected diabetic ulcer of left third toe with osteomyelitis  Diabetic neuropathy  Patient presenting with 7 days of worsening toe redness and swelling. No known trauma. XR with cortical irregularity and moderately displaced comminuted fracture of distal 3rd toe consistent with osteomyelitis. Afebrile and hemodynamically stable.   - Orthopedic Surgery consult.ed  - Per orthopedic surgery, XR is consistent w/osteomyelitis and do not need MRI. Outpatient podiatry follow up.   - Infectious Disease consult. No current plans for surgery.   - IV vancomycin, Zosyn  - Pain: Tylenol PRN, PTA gabapentin  - Blood cultures x2 and wound culture pending     DM2:  - A1C 5.7% in June.  - Not on hypoglycemic medications at this time. Diet controlled.  - Continue Insulin Sliding Scale     Hypertension   - Continue PTA lisinopril-hydrochlorothiazide 0.5 tablets daily, up-titrate as needed     HLD  - Continue PTA pravastatin.        Diet: Regular  DVT Prophylaxis: Heparin SC  Lovenox  Code Status: Full Code      Expected Discharge/Disposition: In next 2-3 days depending on final treatment plan.          Pt's care was discussed with bedside RN, patient and  during Care Team Rounds.       Ama Reyes MD  Hospitalist ( Internal medicine)  Pager: [phone]

## 2021-10-13 NOTE — PLAN OF CARE
2030-2300:  Patient A/Ox4, very pleasant and approachable. Arrived to floor from ED around 2030. VSS. Denies CP, SOB, dizziness/LH. LSCTA. +fl/BS. Voiding well in bathroom. CMS intact ex some baseline neuropathy. Tolerating regular diet without NV. Activity level is good, pt independent. IV SL after antibiotics. Denied pain. Ortho consult in the AM, continue plan of care. Patient has demonstrated ability to call appropriately. Patient is resting with call light within reach. Will continue to monitor.

## 2021-10-13 NOTE — PLAN OF CARE
"  VS: /68 (BP Location: Left arm)   Pulse (!) 49   Temp 97.1  F (36.2  C) (Oral)   Resp 16   Ht 1.854 m (6' 0.99\")   Wt 82.6 kg (182 lb)   SpO2 98%   BMI 24.02 kg/m    Bradycardic, MD updated    O2: 98% on RA, denies SOB or respiratory distress    Output: Voiding adequate amounts with use of toilet    Last BM: 10/12 per pt report    Activity: Up ad gianna    Up for meals? Sat on side of bed for meals    Skin: Left 3rd and 4th toe's wounds, right big toe cracking and callused   Pain: Denies pain or discomfort    CMS: Alert & oriented, able to make needs known. Baseline numbness to feet    Dressing: None    Diet: Regular, blood glucose 113 before breakfast    LDA: Left FA PIV, saline locked    Equipment: IV pump and personal belongings    Plan: Will continue plan of care, Podiatrist due to evaluate    Additional Info:        "

## 2021-10-13 NOTE — PLAN OF CARE
"  VS: /57 (BP Location: Left arm)   Pulse 60   Temp 98.1  F (36.7  C) (Oral)   Resp 16   Ht 1.854 m (6' 0.99\")   Wt 82.6 kg (182 lb)   SpO2 100%   BMI 24.02 kg/m    Pt denies chest pain   O2: 90% on room air. Lung sounds clear and equal bilaterally.    Output: Voids spontaneously without difficulty.    Last BM: 10/12/21 per pt report   Activity: Independent, steady gait   Up for meals? N/A   Skin: L foot - 3rd and 4th toe wounds,open to air.   R foot - big toe cracking and callused   Pain: Pt denies pain   CMS: Pt reports baseline neuropathy   Dressing: None, wounds on toes open to air at this time. Pt requested to leave wounds open to air.    Diet: Regular   LDA: PIV L forearm - SL   Equipment: IV pole/pump, and pt belongings.    Plan: Ortho and ID to consult in AM.    Additional Info:        "

## 2021-10-13 NOTE — PHARMACY-ADMISSION MEDICATION HISTORY
Admission Medication History Completed by Pharmacy    See [facility] Admission Navigator for allergy information, preferred outpatient pharmacy, prior to admission medications and immunization status.     Medication History Sources:     Patient interview    [facility] ([phone])    Changes made to PTA medication list (reason):    Added: None    Deleted: Aspirin 325 mg daily    Changed: None    Additional Information:    Patient reports he hasn't taken gabapentin for a few weeks since he does not believe it helps.    Prior to Admission medications    Medication Sig Last Dose Taking? Auth Provider   gabapentin (NEURONTIN) 300 MG capsule TAKE 1 CAPSULE BY MOUTH THREE TIMES DAILY Past Month Yes Reported, Patient   lisinopril-hydrochlorothiazide (PRINZIDE/ZESTORETIC) 20-25 MG per tablet Take 1 tablet by mouth daily  10/12/2021 Yes Reported, Patient   naproxen (NAPROSYN) 500 MG tablet Take 500 mg by mouth 2 times daily as needed for moderate pain  Past Week Yes Reported, Patient   pravastatin (PRAVACHOL) 10 MG tablet Take 10 mg by mouth daily  10/12/2021 Yes Reported, Patient     Date completed: 10/12/21    Medication history completed by:   Avis Cagle, PharmD, BCPS  Clinical Pharmacist

## 2021-10-13 NOTE — PLAN OF CARE
Patient transported from [address] [place] [address], took report from [place]. VS are stable. Up independently. Put tele on patient.

## 2021-10-13 NOTE — PROGRESS NOTES
Care Management Initial Consult    General Information  Assessment completed with:  ,         Primary Care Provider verified and updated as needed:     Readmission within the last 30 days:           Advance Care Planning:            Communication Assessment  Patient's communication style: spoken language (English or Bilingual)    Hearing Difficulty or Deaf: no   Wear Glasses or Blind: yes    Cognitive  Cognitive/Neuro/Behavioral: WDL                      Living Environment:   People in home:       Current living Arrangements:        Able to return to prior arrangements:         Family/Social Support:  Care provided by:    Provides care for:                  Description of Support System:           Current Resources:   Patient receiving home care services:       Community Resources:    Equipment currently used at home:    Supplies currently used at home:      Employment/Financial:  Employment Status:          Financial Concerns:             Lifestyle & Psychosocial Needs:  Social Determinants of Health     Tobacco Use: High Risk     Smoking Tobacco Use: Current Some Day Smoker     Smokeless Tobacco Use: Never Used   Alcohol Use:      Frequency of Alcohol Consumption:      Average Number of Drinks:      Frequency of Binge Drinking:    Financial Resource Strain:      Difficulty of Paying Living Expenses:    Food Insecurity:      Worried About Running Out of Food in the Last Year:      Ran Out of Food in the Last Year:    Transportation Needs:      Lack of Transportation (Medical):      Lack of Transportation (Non-Medical):    Physical Activity:      Days of Exercise per Week:      Minutes of Exercise per Session:    Stress:      Feeling of Stress :    Social Connections:      Frequency of Communication with Friends and Family:      Frequency of Social Gatherings with Friends and Family:      Attends Mosque Services:      Active Member of Clubs or Organizations:      Attends Club or Organization Meetings:       Marital Status:    Intimate Partner Violence:      Fear of Current or Ex-Partner:      Emotionally Abused:      Physically Abused:      Sexually Abused:    Depression: Not at risk     PHQ-2 Score: 2   Housing Stability:      Unable to Pay for Housing in the Last Year:      Number of Places Lived in the Last Year:      Unstable Housing in the Last Year:        Additional Information:  Benefit check in progress with [facility] for potential home IV antibiotics. [facility] will continue to follow.    Cristine Gastelum RN, BSN  Care Coordinator, [facility] Ortho  Phone [phone]  Pager [phone]

## 2021-10-14 ENCOUNTER — APPOINTMENT (OUTPATIENT)
Dept: GENERAL RADIOLOGY | Facility: CLINIC | Age: 66
DRG: 623 | End: 2021-10-14
Attending: PODIATRIST
Payer: MEDICARE

## 2021-10-14 PROBLEM — R00.1 BRADYCARDIA: Status: ACTIVE | Noted: 2021-10-14

## 2021-10-14 LAB
ANION GAP SERPL CALCULATED.3IONS-SCNC: 4 MMOL/L (ref 3–14)
BASOPHILS # BLD AUTO: 0 10E3/UL (ref 0–0.2)
BASOPHILS NFR BLD AUTO: 1 %
BUN SERPL-MCNC: 24 MG/DL (ref 7–30)
CALCIUM SERPL-MCNC: 8.2 MG/DL (ref 8.5–10.1)
CHLORIDE BLD-SCNC: 112 MMOL/L (ref 94–109)
CO2 SERPL-SCNC: 24 MMOL/L (ref 20–32)
CREAT SERPL-MCNC: 0.89 MG/DL (ref 0.66–1.25)
CREAT SERPL-MCNC: 0.89 MG/DL (ref 0.66–1.25)
EOSINOPHIL # BLD AUTO: 0.2 10E3/UL (ref 0–0.7)
EOSINOPHIL NFR BLD AUTO: 3 %
ERYTHROCYTE [DISTWIDTH] IN BLOOD BY AUTOMATED COUNT: 13 % (ref 10–15)
GFR SERPL CREATININE-BSD FRML MDRD: 88 ML/MIN/1.73M2
GFR SERPL CREATININE-BSD FRML MDRD: 88 ML/MIN/1.73M2
GLUCOSE BLD-MCNC: 105 MG/DL (ref 70–99)
GLUCOSE BLDC GLUCOMTR-MCNC: 110 MG/DL (ref 70–99)
GLUCOSE BLDC GLUCOMTR-MCNC: 111 MG/DL (ref 70–99)
GLUCOSE BLDC GLUCOMTR-MCNC: 95 MG/DL (ref 70–99)
HCT VFR BLD AUTO: 36.3 % (ref 40–53)
HGB BLD-MCNC: 12.1 G/DL (ref 13.3–17.7)
IMM GRANULOCYTES # BLD: 0 10E3/UL
IMM GRANULOCYTES NFR BLD: 0 %
LYMPHOCYTES # BLD AUTO: 1.8 10E3/UL (ref 0.8–5.3)
LYMPHOCYTES NFR BLD AUTO: 24 %
MAGNESIUM SERPL-MCNC: 2.1 MG/DL (ref 1.6–2.3)
MCH RBC QN AUTO: 30.6 PG (ref 26.5–33)
MCHC RBC AUTO-ENTMCNC: 33.3 G/DL (ref 31.5–36.5)
MCV RBC AUTO: 92 FL (ref 78–100)
MONOCYTES # BLD AUTO: 0.6 10E3/UL (ref 0–1.3)
MONOCYTES NFR BLD AUTO: 8 %
NEUTROPHILS # BLD AUTO: 4.9 10E3/UL (ref 1.6–8.3)
NEUTROPHILS NFR BLD AUTO: 64 %
NRBC # BLD AUTO: 0 10E3/UL
NRBC BLD AUTO-RTO: 0 /100
PLATELET # BLD AUTO: 272 10E3/UL (ref 150–450)
POTASSIUM BLD-SCNC: 4.4 MMOL/L (ref 3.4–5.3)
RBC # BLD AUTO: 3.96 10E6/UL (ref 4.4–5.9)
SODIUM SERPL-SCNC: 140 MMOL/L (ref 133–144)
VANCOMYCIN SERPL-MCNC: 9.9 MG/L
WBC # BLD AUTO: 7.6 10E3/UL (ref 4–11)

## 2021-10-14 PROCEDURE — 93010 ELECTROCARDIOGRAM REPORT: CPT | Performed by: INTERNAL MEDICINE

## 2021-10-14 PROCEDURE — 250N000011 HC RX IP 250 OP 636: Performed by: PHYSICIAN ASSISTANT

## 2021-10-14 PROCEDURE — 73630 X-RAY EXAM OF FOOT: CPT | Mod: 26 | Performed by: RADIOLOGY

## 2021-10-14 PROCEDURE — 250N000011 HC RX IP 250 OP 636: Performed by: HOSPITALIST

## 2021-10-14 PROCEDURE — L3260 AMBULATORY SURGICAL BOOT EAC: HCPCS

## 2021-10-14 PROCEDURE — 85004 AUTOMATED DIFF WBC COUNT: CPT | Performed by: HOSPITALIST

## 2021-10-14 PROCEDURE — 80202 ASSAY OF VANCOMYCIN: CPT

## 2021-10-14 PROCEDURE — 99232 SBSQ HOSP IP/OBS MODERATE 35: CPT | Performed by: HOSPITALIST

## 2021-10-14 PROCEDURE — 0JBR0ZZ EXCISION OF LEFT FOOT SUBCUTANEOUS TISSUE AND FASCIA, OPEN APPROACH: ICD-10-PCS | Performed by: PODIATRIST

## 2021-10-14 PROCEDURE — 36415 COLL VENOUS BLD VENIPUNCTURE: CPT | Performed by: INTERNAL MEDICINE

## 2021-10-14 PROCEDURE — 258N000003 HC RX IP 258 OP 636: Performed by: INTERNAL MEDICINE

## 2021-10-14 PROCEDURE — 11042 DBRDMT SUBQ TIS 1ST 20SQCM/<: CPT | Performed by: PODIATRIST

## 2021-10-14 PROCEDURE — 120N000002 HC R&B MED SURG/OB UMMC

## 2021-10-14 PROCEDURE — 73630 X-RAY EXAM OF FOOT: CPT | Mod: LT

## 2021-10-14 PROCEDURE — 36415 COLL VENOUS BLD VENIPUNCTURE: CPT

## 2021-10-14 PROCEDURE — 99233 SBSQ HOSP IP/OBS HIGH 50: CPT | Mod: 24 | Performed by: INTERNAL MEDICINE

## 2021-10-14 PROCEDURE — 93005 ELECTROCARDIOGRAM TRACING: CPT

## 2021-10-14 PROCEDURE — 83735 ASSAY OF MAGNESIUM: CPT | Performed by: HOSPITALIST

## 2021-10-14 PROCEDURE — 36415 COLL VENOUS BLD VENIPUNCTURE: CPT | Performed by: HOSPITALIST

## 2021-10-14 PROCEDURE — 250N000011 HC RX IP 250 OP 636: Performed by: INTERNAL MEDICINE

## 2021-10-14 PROCEDURE — 250N000009 HC RX 250: Performed by: PODIATRIST

## 2021-10-14 PROCEDURE — 80048 BASIC METABOLIC PNL TOTAL CA: CPT | Performed by: HOSPITALIST

## 2021-10-14 PROCEDURE — 250N000013 HC RX MED GY IP 250 OP 250 PS 637: Performed by: PHYSICIAN ASSISTANT

## 2021-10-14 RX ORDER — CEFAZOLIN SODIUM 1 G/50ML
1250 SOLUTION INTRAVENOUS EVERY 12 HOURS
Status: DISCONTINUED | OUTPATIENT
Start: 2021-10-14 | End: 2021-10-15

## 2021-10-14 RX ORDER — POVIDONE-IODINE 10 MG/G
OINTMENT TOPICAL DAILY
Status: DISCONTINUED | OUTPATIENT
Start: 2021-10-14 | End: 2021-10-15 | Stop reason: CLARIF

## 2021-10-14 RX ADMIN — ENOXAPARIN SODIUM 40 MG: 40 INJECTION SUBCUTANEOUS at 14:02

## 2021-10-14 RX ADMIN — PIPERACILLIN SODIUM AND TAZOBACTAM SODIUM 4.5 G: 4; .5 INJECTION, POWDER, LYOPHILIZED, FOR SOLUTION INTRAVENOUS at 19:45

## 2021-10-14 RX ADMIN — LISINOPRIL AND HYDROCHLOROTHIAZIDE 1 TABLET: 12.5; 1 TABLET ORAL at 08:47

## 2021-10-14 RX ADMIN — PIPERACILLIN SODIUM AND TAZOBACTAM SODIUM 4.5 G: 4; .5 INJECTION, POWDER, LYOPHILIZED, FOR SOLUTION INTRAVENOUS at 01:30

## 2021-10-14 RX ADMIN — VANCOMYCIN HYDROCHLORIDE 1250 MG: 1 INJECTION, POWDER, LYOPHILIZED, FOR SOLUTION INTRAVENOUS at 20:41

## 2021-10-14 RX ADMIN — PIPERACILLIN SODIUM AND TAZOBACTAM SODIUM 4.5 G: 4; .5 INJECTION, POWDER, LYOPHILIZED, FOR SOLUTION INTRAVENOUS at 07:49

## 2021-10-14 RX ADMIN — PIPERACILLIN SODIUM AND TAZOBACTAM SODIUM 4.5 G: 4; .5 INJECTION, POWDER, LYOPHILIZED, FOR SOLUTION INTRAVENOUS at 14:02

## 2021-10-14 RX ADMIN — POVIDONE-IODINE: 100 OINTMENT TOPICAL at 14:02

## 2021-10-14 RX ADMIN — VANCOMYCIN HYDROCHLORIDE 1000 MG: 1 INJECTION, SOLUTION INTRAVENOUS at 08:51

## 2021-10-14 RX ADMIN — PRAVASTATIN SODIUM 10 MG: 10 TABLET ORAL at 08:47

## 2021-10-14 NOTE — PROGRESS NOTES
S: order received to see patient at [facility] for an eval for an offloading shoe  and a ROLL A BOUT as ordered by Sean. WE DO NOT STOCK THE ROLL A BOUT- I BELIEVE THAT HAS TO BE ORDERED THROUGH HOME MEDICAL  O/G: offload ulcerated area on patient's left foot  A: patient seen for an evaluation.  Patient has ulceration(s) on their left foot.  Pegs were removed under ulcerated area.  Patient was fit with a size XL DH2 offloading shoe.  P: patient to contact us if any issues arise.  Manny ALEX,LO.

## 2021-10-14 NOTE — PROGRESS NOTES
GENERAL INFECTIOUS DISEASES PROGRESS NOTE     Patient:  Kareem Bai   Date of birth 1955, Medical record number [id number]  Date of Visit:  10/14/2021  Date of Admission: 10/12/2021  Consult Requested by: Jerson Estrada, *  Reason for Consult:  infected diabetic ulcer with associated osteomyelitis of left third toe         Assessment and Recommendations:     Kareem Bai is a 66 year old male with medical history significant for diet controlled Diabetes mellitus2 HbA1c 5.7( 6/11/21) with peripheral neuropathy, HTN, HLD, arthritis, Right TKA (2018), occasional cigar smoker, sleep apnea, who was admitted on 10/12/21 with infected left third toe.      ASSESSMENT:  1. Left 3rd toe draining wound with osteomyelitis on Xray 10/12/21  - wound drainage cx - gram stain - 1+ GPC and 3+ WBC, predominant PMN, Cx  positive for 3+ Staph aureus and 3+ Streptococcus canis   - Blood cx - neg x 2 (10/12/21)   - CRP 43 , WBC 7.7, ESR 16 (10/12/21)     Xray of left foot 10/12/21  IMPRESSION:  There is cortical irregularity and a moderately displaced comminuted fracture of the distal tuft of the 3rd toe with overlying soft tissue swelling. The findings would be strong evidence of osteomyelitis in the appropriate clinical setting, but could also be related to trauma. No other significant findings.    2. Diet and exercise controlled Diabetes mellitus HbA1c 5.7( 6/11/21) with peripheral neuropathy,  3. Hypertension   4. Hyperlipidemia   5. Right TKA in 2018   6. Occasional cigar smoker   7. Right foot with decreased pedal pulses , cool to touch   8. LAURA  9. Listed as allergic to Ampicillin ( hives) . patient says this happened in early childhood and not sure if this is a real allergic reaction/ due to other causes. He says he had tolerated Amoxicillin and other type of PCN well. He is on Zosyn without any problems     RECOMMENDATION:  - continue Pip/Tazobactam and Vancomycin IV for now , adjust per culture  results  - follow-up wound and blood cultures     ID will follow and further recommendations to follow . Plan discussed with Primary team , patient and wife    Gerald Kat MD, M.Med.Sc  Staff, Infectious Diseases   Pager : [phone]    Interval History  feels well. no fever , unclear about any night sweats. tolerates Vancomycin IV and Zosyn well.   seen by podiatrist today and did bedside callus and bone debridement. he tolerated the procedure well, no sensation in left foot.          History of Present Illness:     Kareem Bai is a 66 year old male with medical history significant for diet controlled Diabetes mellitus2 HbA1c 5.7( 6/11/21) with peripheral neuropathy, HTN, HLD, Right TKA (2018), occasional cigar smoker , sleep apnea, who was admitted on 10/12/21 with infected left third toe.  He started noticing swelling and redness of the left 3rd toe about 7 days prior to admission, then it would go down the next days and started to get redder and more swollen 2 days before he presented to ER. He had nightsweats the night before he came in. He denies any fever or chills at home. He denies any trauma or injury to the foot. He has decreased sensation in the feet. He did not check his feet on a regular basis. He denies noticing any bleeding or discharge from the toe. He walked through a swampy area about 1 week ago , and his shoes are not water resistant,  before noticing redness/ swelling in that toe. He denies wearing new foot wear but always has some problems with his shoes, being loose with some friction with walking. walks 2 dogs on a regular basis. Right great toe- with a callous, with occasional bleeding    He does not have a PCP. of note, he presented to ER on 9/8/21 with fatigue and weight loss. Has lost about 35 lbs/ in 1 year. He has good appetite but watches his diet closely and exercises regularly.  his diabetes is well controlled with HbA1c 5.7 on 6/11/21. He says his energy level  has improved since then.     CRP 43 , WBC 7.7, ESR 16 (10/12/21)   SARSCoV2 PCR - neg on 10/12/21     Xray of left footshowed cortical irregularity and a moderately displaced comminuted fracture of the distal tuft of the 3rd toe with overlying soft tissue swelling. The findings would be strong evidence of osteomyelitis in the appropriate clinical setting, but could also be related to trauma. No other significant findings.     Cultures are pending. He is currently on Pip/Tazobactam and Vancomycin IV and seems to tolerate the antibiotics well.     Imaging:   Xray of left foot 10/12/21  IMPRESSION:  1. There is cortical irregularity and a moderately displaced comminuted fracture of the distal tuft of the 3rd toe with overlying soft tissue swelling. The findings would be strong evidence of osteomyelitis in the appropriate clinical setting, but could also be related to trauma.  2. No other significant findings.          Review of Systems:   CONSTITUTIONAL:  No fevers or chills  EYES: negative for icterus  ENT:  negative for hearing loss, tinnitus and sore throat  RESPIRATORY:  negative for cough with sputum and dyspnea  CARDIOVASCULAR:  negative for chest pain, dyspnea  GASTROINTESTINAL:  negative for nausea, vomiting, diarrhea and constipation  GENITOURINARY:  negative for dysuria  HEME:  No easy bruising  INTEGUMENT:  see HPI   NEURO:  Negative for headache         Past Medical History:     Past Medical History:   Diagnosis Date     Hypertension      Neuropathy      Prediabetes      Sleep apnea           Past Surgical History:     Past Surgical History:   Procedure Laterality Date     OPTICAL TRACKING SYSTEM ARTHROPLASTY KNEE Right 1/4/2018    Procedure: OPTICAL TRACKING SYSTEM ARTHROPLASTY KNEE;  Right Total Knee Arthroplasty;  Surgeon: Marcello Beatty MD;  Location: [facility] OR          Family History:   reviewed, non contributory         Social History:     Social History     Tobacco Use     Smoking status: Current  Some Day Smoker     Types: Cigars     Smokeless tobacco: Never Used     Tobacco comment: 5-6/week   Substance Use Topics     Alcohol use: Yes     Comment: Drink every 2-3 days     History   Sexual Activity     Sexual activity: Not on file     lives in [place] with wife, takes care of 2 dogs , that belong to his son.   retired postman  occasional alcohol and cigar smoker          Current Medications (antimicrobials listed in bold):       Cadexomer Iodine (topical) 0.9%   Topical Daily     enoxaparin ANTICOAGULANT  40 mg Subcutaneous Q24H     insulin aspart  1-3 Units Subcutaneous TID AC     insulin aspart  1-3 Units Subcutaneous At Bedtime     lisinopril-hydrochlorothiazide  1 tablet Oral Daily     piperacillin-tazobactam  4.5 g Intravenous Q6H     pravastatin  10 mg Oral Daily     sodium chloride (PF)  3 mL Intracatheter Q8H     vancomycin  1,000 mg Intravenous Q12H          Allergies:     Allergies   Allergen Reactions     Ampicillin Other (See Comments) and Hives     Codeine GI Disturbance     LW Reaction: gi upset     Simvastatin Other (See Comments)          Physical Exam:   Vitals were reviewed  Patient Vitals for the past 24 hrs:   BP Temp Temp src Pulse Resp SpO2   10/14/21 0847 110/66 -- -- 60 -- --   10/14/21 0837 104/65 -- Oral 61 15 92 %   10/14/21 0520 102/66 97.8  F (36.6  C) Oral (!) 46 -- 97 %   10/13/21 2226 108/68 98.4  F (36.9  C) Oral 77 16 96 %   10/13/21 1447 118/72 98.5  F (36.9  C) Oral 58 18 --   10/13/21 1251 (!) 149/76 -- -- 59 -- 94 %     Ranges for his vital signs:  Temp:  [97.8  F (36.6  C)-98.5  F (36.9  C)] 97.8  F (36.6  C)  Pulse:  [46-77] 60  Resp:  [15-18] 15  BP: (102-149)/(65-76) 110/66  SpO2:  [92 %-97 %] 92 %    Physical Examination:  GENERAL:  well-developed, well-nourished, in bed in no acute distress.    HEENT:  Head is normocephalic, atraumatic   EYES:  Eyes have anicteric sclerae without conjunctival injection   NECK:  Supple. No  Cervical lymphadenopathy  LUNGS:   Clear to auscultation bilateral.   CARDIOVASCULAR:  Regular rate and rhythm with no murmurs, gallops or rubs.  ABDOMEN:  Normal bowel sounds, soft, nontender. No appreciable hepatosplenomegaly  SKIN:  Left 3rd toe - less red and less swollen ( based on photos taken earlier today) toe is covered now after debridement.  no rashes else where  Extremities: no edema in lower extremities. good pedal pulses in left foot, warm to touch. Right foot with decreased pedal pulses, cool to touch. right great toe with dark, callous  . arthritic toes.   right TKA . no signs of infection of knee/ ankle joints  PIV Line are in place without any surrounding erythema or exudate. No stigmata of endocarditis.  NEUROLOGIC:  Grossly nonfocal. Active x4 extremities         Laboratory Data:     Inflammatory Markers    Recent Labs   Lab Test 10/12/21  1655   SED 16   CRP 43.0*     Hematology Studies    Recent Labs   Lab Test 10/14/21  0721 10/13/21  0618 10/12/21  1655 09/08/21  1533 01/06/18  0557 01/05/18  0653   WBC 7.6 6.0 7.7 6.8  --   --    HGB 12.1* 11.7* 13.6 12.4* 11.6* 13.1*   MCV 92 91 92 91  --   --     260 296 275  --   --      Immune Globulin Studies    Recent Labs   Lab Test 03/15/19  0915      IGM 38*        Metabolic Studies     Recent Labs   Lab Test 10/14/21  0657 10/13/21  0618 10/12/21  1655 09/08/21  1533 10/29/19  0950    140 138 140 139   POTASSIUM 4.4 4.5 4.0 3.8 4.4   CHLORIDE 112* 110* 105 107 106   CO2 24 27 31 30 29   BUN 24 18 13 23 14   CR 0.89  0.89 0.82 0.76 0.77 0.79   GFRESTIMATED 88  88 >90 >90 >90 >90     Hepatic Studies    Recent Labs   Lab Test 09/08/21  1533   BILITOTAL 0.7   ALKPHOS 53   ALBUMIN 3.3*   AST 15   ALT 24     Thyroid Studies    Recent Labs   Lab Test 03/15/19  0915   TSH 2.42     Urine Studies    Recent Labs   Lab Test 09/08/21  1544 01/04/18  0600   LEUKEST Negative Trace*   WBCU 1 <1

## 2021-10-14 NOTE — PROGRESS NOTES
"CLINICAL NUTRITION SERVICES - ASSESSMENT NOTE     Nutrition Prescription    RECOMMENDATIONS FOR MDs/PROVIDERS TO ORDER:  None today    Malnutrition Status:    Patient does not meet two of the established criteria necessary for diagnosing malnutrition    Recommendations already ordered by Registered Dietitian (RD):  Nutrition Education  Allow double portions  String cheese at all snack times      Future/Additional Recommendations:  Monitor intakes, wt trends and tolerance to snacks-adjust PRN     REASON FOR ASSESSMENT  Kareem Bai is a 66 year old male assessed by the dietitian for MST score of 3: positive  for weight loss of 24-33 lbs and negative  for poor oral intake related to decreased appetite    PMH significant for HTH, HLD diabetes mellitus and neuropathy in both feet who presents to this ED for evaluation of left middle toe swelling and redness. Found to have infected diabetic ulcer of left third toe with osteomyelitis  NUTRITION HISTORY  Pt reported a UBW of 220#, most recently in July of 2020. Stated he was diagnosed with \"pre diabetes\" so he he changed his diet around this time. He has been cutting down of portions of high CHO foods or replacing them with lower CHO options (replaced 1% milk with unsweetened almond milk). Stated he will typically eat 2 meals/day:   Breakfast: large bowl of cereal with unsweetened almond milk and a banana OR a veggie omelet    Dinner: varies based on what his wife is preparing  He has also cut out snacks as he would typically eat high CHO foods. Stated he might be eating less since making these changes but wasn't sure (previously wasn't worried about what/how much he was eating).     DM is currently diet and exercise controlled.   CURRENT NUTRITION ORDERS  Diet: Regular  Intake/Tolerance: Pt stated his appetite has remained good, believes he is eating more here than he was at home d/t not having to prepare the food.  He has been ordering multiple times during meal " "periods d/t being limited on portions when ordering. Discussed adding double portions and snacks which he was agreeable to.    LABS  Labs reviewed:  Last A1c from 10/18/2019: 7.2    MEDICATIONS  Medications reviewed:  novolog  IV abx  PRN: zofran    ANTHROPOMETRICS  Height: 185.4 cm (6' .992\"[Taken 10/12/21 10:33 AM from [facility] [employer][)  Most Recent Weight: 82.6 kg (182 lb)    IBW: 83.6 kg  BMI: 24.02 kg/m^2, Normal BMI  Weight History: Stable x1 month. 35# (16.1%) wt loss in 7 months   Wt Readings from Last 10 Encounters:   10/12/21 82.6 kg (182 lb)   09/08/21 83.2 kg (183 lb 8 oz)   03/15/21 98.4 kg (217 lb)   10/29/19 102.6 kg (226 lb 4.8 oz)   10/18/19 102.3 kg (225 lb 9.6 oz)   03/12/18 104.3 kg (230 lb)   01/18/18 98.9 kg (218 lb)   01/05/18 101.1 kg (222 lb 14.2 oz)   07/31/17 105.2 kg (232 lb)     Dosing Weight: 82.6 kg (current)    ASSESSED NUTRITION NEEDS  Estimated Energy Needs: 3223-8862 kcals/day (25 - 30 kcals/kg)  Justification: Maintenance and Wound healing  Estimated Protein Needs:  grams protein/day (1.2 - 1.5 grams of pro/kg)  Justification: Increased needs (acute illness) and Wound healing  Estimated Fluid Needs:1 mL/kcal  Justification: Maintenance or Per provider pending fluid status    PHYSICAL FINDINGS  See malnutrition section below.    MALNUTRITION  % Intake: Decreased intake does not meet criteria  % Weight Loss: Weight loss does not meet criteria  Subcutaneous Fat Loss: Facial region (buccal): mild  Muscle Loss: None observed  Fluid Accumulation/Edema: None noted  Malnutrition Diagnosis: Patient does not meet two of the established criteria necessary for diagnosing malnutrition    NUTRITION DIAGNOSIS  Predicted inadequate oral intake related to diet changes 2/2 new DM diagnosis as evidenced by 35# (16.1%) wt loss in 7 months       INTERVENTIONS  Implementation  Nutrition Education: Discussed role of RD, menu ordering and available snacks/supplements. Discussed eating more frequent " meals/snakcs at home (currently eats 2 meals/day) and monitoring wt. Pt was worried about adding snacks d/t CHO content. Discussed lower CHO snacks.     Allow double portions  String cheese at all snack times      Goals  Patient to consume % of nutritionally adequate meal trays TID, or the equivalent with supplements/snacks.     Monitoring/Evaluation  Progress toward goals will be monitored and evaluated per protocol.    Julianne Vitale MS, RD, LDN  Unit Pager [phone]

## 2021-10-14 NOTE — PROGRESS NOTES
"Care Management Follow Up    Length of Stay (days): 2    Expected Discharge Date: 10/17/2021     Concerns to be Addressed:       Patient plan of care discussed at interdisciplinary rounds: Yes    Anticipated Discharge Disposition:  Home      Anticipated Discharge Services: Possible home IV abx     Additional Information:  Previous RNCC sent referral to [facility] ([facility]) for insurance benefit check. Per [facility], Patient has \"100% coverage for IV abx through [facility] Federal secondary plan\". Awaiting abx plan. RNCC to follow up with patient when plan is known.       ANUSHKA Vega RNCC  RN Care Coordinator   Office: [phone]   Pager: [phone]  Weekend pager: [phone]        "

## 2021-10-14 NOTE — PLAN OF CARE
Pt A&O x4. Up ind. Able to walk in hallway. Continent. Baseline neuropathy in BLE. Denies pain. Old IV leaking, RN flyer came to place new IV. SL in between abx. Podiatrist came for L 3rd toe, dressing changed by writer per orders. BGs were WNL, no insulin required. Echo to occur tomorrow. Continue w/ plan of care.

## 2021-10-14 NOTE — PROGRESS NOTES
"[facility], [place]   Internal Medicine Daily Note           Interval History/Events     Patient seen and examined.  No events overnight.  He does not have any pain.  Patient has had intermittent periods of bradycardia which seem to be asymptomatic.       Review of Systems        4 point ROS including Respiratory, CV, GI and , other than that noted above is negative      Medications   I have reviewed current medications  in the \"current medication\" section of Epic.  Relevant changes include:     Physical Exam       Vital signs:    Blood pressure 102/66, pulse (!) 46, temperature 97.8  F (36.6  C), temperature source Oral, resp. rate 16, height 1.854 m (6' 0.99\"), weight 82.6 kg (182 lb), SpO2 97 %.  Estimated body mass index is 24.02 kg/m  as calculated from the following:    Height as of this encounter: 1.854 m (6' 0.99\").    Weight as of this encounter: 82.6 kg (182 lb).    No intake or output data in the 24 hours ending 10/13/21 1224   Gen: Well Appearing. In NAD.  HEENT: NC/AT, EOMI  Neck: Supple  CV: Normal S1S2, Regular rhythm, normal rate  Lungs: Clear to ascultation b/l  Abd: Soft, NT/ND, +BS  Extremties:  See ortho note for images of affected left third toe. No LE Edema b/l   Neurologic: AAOx3     Laboratory and Imaging Studies     I have reviewed  laboratory and imaging studies in the Epic. Pertinent findings are as below:    BMP  Recent Labs   Lab 10/13/21  2224 10/13/21  1807 10/13/21  1333 10/13/21  0822 10/13/21  0618 10/13/21  0213 10/12/21  2215 10/12/21  1655   NA  --   --   --   --  140  --   --  138   POTASSIUM  --   --   --   --  4.5  --   --  4.0   CHLORIDE  --   --   --   --  110*  --   --  105   BELGICA  --   --   --   --  8.1*  --   --  8.8   CO2  --   --   --   --  27  --   --  31   BUN  --   --   --   --  18  --   --  13   CR  --   --   --   --  0.82  --   --  0.76   * 106* 111* 113* 98   < >   < > 87    < > = values in this interval not displayed. "     CBC  Recent Labs   Lab 10/13/21  0618 10/12/21  1655 10/12/21  1655   WBC 6.0  --  7.7   RBC 3.95*  --  4.43   HGB 11.7*   < > 13.6   HCT 35.9*  --  40.7   MCV 91  --  92   MCH 29.6  --  30.7   MCHC 32.6  --  33.4   RDW 12.7  --  12.9     --  296    < > = values in this interval not displayed.     INRNo lab results found in last 7 days.  LFTsNo lab results found in last 7 days.   PANCNo lab results found in last 7 days.        Impression/Plan      Kareem Bai is a 66 year old male with a history of DM2, diabetic neuropathy, HTN, HLD, who was admitted with an infected wound of the left third toe with associated ostemyelitis.      Infected diabetic ulcer of left third toe with osteomyelitis  Diabetic neuropathy  Patient presenting with 7 days of worsening toe redness and swelling. No known trauma. XR with cortical irregularity and moderately displaced comminuted fracture of distal 3rd toe consistent with osteomyelitis. Afebrile and hemodynamically stable.   - Orthopedic Surgery consult.ed  - Per orthopedic surgery, XR is consistent w/osteomyelitis and do not need MRI. Outpatient podiatry follow up. No current plans for surgery.   - Blood culture ngtd. Wound culture with staph & strep  - Infectious Disease consulted and following   - Continue IV vancomycin, Zosyn per ID  - Pain: Tylenol PRN, PTA gabapentin    Bradycardia  - Not clear etiology.   - Will assess his response to activity. Discussed with RN.  - He didn't have EKG on admission, so will request one now  - Transferred to new room for telemetry monitoring  - Check Echocardiogram      DM2  - A1C 5.7% in June.  - Not on hypoglycemic medications at this time. Diet controlled.  - Continue Insulin Sliding Scale     Hypertension  - Blood pressure readings acceptable.  - Continue PTA lisinopril-hydrochlorothiazide 0.5 tablets daily    HLD  - Continue PTA pravastatin.        Diet: Regular  DVT Prophylaxis:  Lovenox  Code Status: Full Code      Expected  Discharge/Disposition: In next 1-2 days depending on final antibiotic/treatment plan.          Pt's care was discussed with bedside RN, patient and  during Care Team Rounds.       Ama Reyes MD  Hospitalist ( Internal medicine)  Pager: [phone]

## 2021-10-14 NOTE — CONSULTS
Past Medical History:   Diagnosis Date     Hypertension      Neuropathy      Prediabetes      Sleep apnea      Patient Active Problem List   Diagnosis     Hx of total knee arthroplasty     Diabetes mellitus, type 2 (H)     Diabetic ulcer of toe of left foot associated with type 2 diabetes mellitus, with fat layer exposed (H)     Bradycardia     Past Surgical History:   Procedure Laterality Date     OPTICAL TRACKING SYSTEM ARTHROPLASTY KNEE Right 1/4/2018    Procedure: OPTICAL TRACKING SYSTEM ARTHROPLASTY KNEE;  Right Total Knee Arthroplasty;  Surgeon: Marcello Beatty MD;  Location:  OR     Social History     Socioeconomic History     Marital status:      Spouse name: Not on file     Number of children: Not on file     Years of education: Not on file     Highest education level: Not on file   Occupational History     Not on file   Tobacco Use     Smoking status: Current Some Day Smoker     Types: Cigars     Smokeless tobacco: Never Used     Tobacco comment: 5-6/week   Substance and Sexual Activity     Alcohol use: Yes     Comment: Drink every 2-3 days     Drug use: No     Sexual activity: Not on file   Other Topics Concern     Not on file   Social History Narrative     Not on file     Social Determinants of Health     Financial Resource Strain:      Difficulty of Paying Living Expenses:    Food Insecurity:      Worried About Running Out of Food in the Last Year:      Ran Out of Food in the Last Year:    Transportation Needs:      Lack of Transportation (Medical):      Lack of Transportation (Non-Medical):    Physical Activity:      Days of Exercise per Week:      Minutes of Exercise per Session:    Stress:      Feeling of Stress :    Social Connections:      Frequency of Communication with Friends and Family:      Frequency of Social Gatherings with Friends and Family:      Attends Worship Services:      Active Member of Clubs or Organizations:      Attends Club or Organization Meetings:      Marital  Status:    Intimate Partner Violence:      Fear of Current or Ex-Partner:      Emotionally Abused:      Physically Abused:      Sexually Abused:      No family history on file.  Lab Results   Component Value Date    WBC 7.6 10/14/2021     Lab Results   Component Value Date    RBC 3.96 10/14/2021     Lab Results   Component Value Date    HGB 12.1 10/14/2021    HGB 11.6 01/06/2018     Lab Results   Component Value Date    HCT 36.3 10/14/2021     No components found for: MCT  Lab Results   Component Value Date    MCV 92 10/14/2021     Lab Results   Component Value Date    MCH 30.6 10/14/2021     Lab Results   Component Value Date    MCHC 33.3 10/14/2021     Lab Results   Component Value Date    RDW 13.0 10/14/2021     Lab Results   Component Value Date     10/14/2021     Last Comprehensive Metabolic Panel:  Sodium   Date Value Ref Range Status   10/14/2021 140 133 - 144 mmol/L Final   10/29/2019 139 133 - 144 mmol/L Final     Potassium   Date Value Ref Range Status   10/14/2021 4.4 3.4 - 5.3 mmol/L Final   10/29/2019 4.4 3.4 - 5.3 mmol/L Final     Chloride   Date Value Ref Range Status   10/14/2021 112 (H) 94 - 109 mmol/L Final   10/29/2019 106 94 - 109 mmol/L Final     Carbon Dioxide   Date Value Ref Range Status   10/29/2019 29 20 - 32 mmol/L Final     Carbon Dioxide (CO2)   Date Value Ref Range Status   10/14/2021 24 20 - 32 mmol/L Final     Anion Gap   Date Value Ref Range Status   10/14/2021 4 3 - 14 mmol/L Final   10/29/2019 4 3 - 14 mmol/L Final     Glucose   Date Value Ref Range Status   10/14/2021 105 (H) 70 - 99 mg/dL Final   10/29/2019 135 (H) 70 - 99 mg/dL Final     Comment:     Non Fasting     Urea Nitrogen   Date Value Ref Range Status   10/14/2021 24 7 - 30 mg/dL Final   10/29/2019 14 7 - 30 mg/dL Final     Creatinine   Date Value Ref Range Status   10/14/2021 0.89 0.66 - 1.25 mg/dL Final   10/14/2021 0.89 0.66 - 1.25 mg/dL Final   10/29/2019 0.79 0.66 - 1.25 mg/dL Final     GFR Estimate   Date  Value Ref Range Status   10/14/2021 88 >60 mL/min/1.73m2 Final     Comment:     As of July 11, 2021, eGFR is calculated by the CKD-EPI creatinine equation, without race adjustment. eGFR can be influenced by muscle mass, exercise, and diet. The reported eGFR is an estimation only and is only applicable if the renal function is stable.  As of July 11, 2021, eGFR is calculated by the CKD-EPI creatinine equation, without race adjustment. eGFR can be influenced by muscle mass, exercise, and diet. The reported eGFR is an estimation only and is only applicable if the renal function is stable.   10/14/2021 88 >60 mL/min/1.73m2 Final     Comment:     As of July 11, 2021, eGFR is calculated by the CKD-EPI creatinine equation, without race adjustment. eGFR can be influenced by muscle mass, exercise, and diet. The reported eGFR is an estimation only and is only applicable if the renal function is stable.   10/29/2019 >90 >60 mL/min/[1.73_m2] Final     Comment:     Non  GFR Calc  Starting 12/18/2018, serum creatinine based estimated GFR (eGFR) will be   calculated using the Chronic Kidney Disease Epidemiology Collaboration   (CKD-EPI) equation.       Calcium   Date Value Ref Range Status   10/14/2021 8.2 (L) 8.5 - 10.1 mg/dL Final   10/29/2019 9.6 8.5 - 10.1 mg/dL Final     Lab Results   Component Value Date    SED 16 10/12/2021     Lab Results   Component Value Date    CRP 43.0 10/12/2021             Note dictated.

## 2021-10-14 NOTE — PLAN OF CARE
"      VS:   Blood pressure 107/60, pulse 60, temperature 98  F (36.7  C), temperature source Oral, resp. rate 16, height 1.854 m (6' 0.99\"), weight 82.6 kg (182 lb), SpO2 95 %.  On RA     Output: Continent of B/B, LBM 10/12 per pt.   Lungs: Clear, denied SOB,   Activity: IND.    Skin: Visible intact, refused full skin assessment, L 3rd toe wound.    Pain:   Denied.   Neuro/CMS:   A & Ox 4, BLL neuropathy baseline,    Dressing(s):   L 3rd toe dressing changed after showering.    Diet:   R/T, good appetite, BG checked before meals with sliding scale.    LDA:   L PIV SL between Abx infushing.    Equipment:   IV pole, call light.   Plan:   Continue with POC. Plan to discharge home on 10/17.    Additional Info:   TELE in place due to bradycardia.   Echo to occur tomorrow.        "

## 2021-10-14 NOTE — H&P
Admitted: 10/12/2021    SUBJECTIVE FINDINGS:  A 66-year-old presents to Podiatry for a left third toe ulcer.  He relates about 9 days ago, he was doing some biking in kind of a swampy area.  The toe got wet.  He has neuropathy, so he did not feel it and then it got infected.  He went to his primary physician, who sent him to the hospital.  He has been on IV antibiotics.  He does have diabetes with peripheral neuropathy.  He relates no specific injuries.    OBJECTIVE FINDINGS:  DP and PT are 2/4, left.  He has dorsally contracted digits 1 through 5, left.  He has left third toe with erythema, edema, distal ulcer with hyperkeratotic tissue buildup.  There is sub-hyperkeratotic serosanguineous drainage.  This tracks down to the distal phalanx with some soft distal phalanx bone.  There is no odor, no calor.  There is no pain on palpation.  He did have some pain with debridement, but he relates that was minimal.     Previous notes reviewed as noted in the EMR as well as the Orthopedic resident note, which I discussed with the Orthopedic resident yesterday diagnosis and treatment.    ASSESSMENT AND PLAN:  Ulcer, left 3rd toe, with osteomyelitic changes of the distal phalanx, left third toe.    X-rays reviewed.  He has a distal phalanx with cortical break in the margins and cortical disruption consistent with osteomyelitis and/or fracture.  Diagnosis and treatment options discussed with the patient.  The left second toe ulcer was sharp excisionally debrided with a tissue cutter through the subcutaneous tissues and part of the distal phalanx debrided back to solid bone upon consent.  The ulcer bled well upon debridement.  I copiously irrigated with Microklenz and applied a light dressing.  I am going to have him clean this daily with Microklenz, apply Iodosorb gel and a light dressing.  DH2 shoe ordered and use discussed with him.  Roll-A-Bout ordered and use discussed with him.  He should be nonweightbearing on this.  He  is okay to heel weight bear for transfers and bathroom.  Follow up with Podiatry in 1-2 weeks, IV antibiotics as per primary care.  The patient is wearing foot orthotics.  Those are worn and we will reevaluate this as outpatient for his shoes and orthotic devices.  Todays xrays reviewed with debrided distal phalanx observed.    Alvarado Lua DPM        D: 10/14/2021   T: 10/14/2021   MT: [id number]    Name:     REGINALD KELLY  MRN:      [id number]        Account:     [id number]   :      1955           Admitted:    10/12/2021       Document: [id number]

## 2021-10-15 ENCOUNTER — APPOINTMENT (OUTPATIENT)
Dept: CARDIOLOGY | Facility: CLINIC | Age: 66
DRG: 623 | End: 2021-10-15
Attending: HOSPITALIST
Payer: MEDICARE

## 2021-10-15 ENCOUNTER — HOME INFUSION (PRE-WILLOW HOME INFUSION) (OUTPATIENT)
Dept: PHARMACY | Facility: CLINIC | Age: 66
End: 2021-10-15

## 2021-10-15 VITALS
HEIGHT: 73 IN | RESPIRATION RATE: 16 BRPM | BODY MASS INDEX: 24.12 KG/M2 | HEART RATE: 51 BPM | TEMPERATURE: 97.7 F | SYSTOLIC BLOOD PRESSURE: 115 MMHG | WEIGHT: 182 LBS | DIASTOLIC BLOOD PRESSURE: 74 MMHG | OXYGEN SATURATION: 97 %

## 2021-10-15 LAB
ATRIAL RATE - MUSE: 42 BPM
BACTERIA WND CULT: ABNORMAL
CREAT SERPL-MCNC: 0.92 MG/DL (ref 0.66–1.25)
CRP SERPL-MCNC: 16 MG/L (ref 0–8)
DIASTOLIC BLOOD PRESSURE - MUSE: NORMAL MMHG
GFR SERPL CREATININE-BSD FRML MDRD: 86 ML/MIN/1.73M2
GLUCOSE BLDC GLUCOMTR-MCNC: 102 MG/DL (ref 70–99)
GLUCOSE BLDC GLUCOMTR-MCNC: 87 MG/DL (ref 70–99)
HOLD SPECIMEN: NORMAL
INTERPRETATION ECG - MUSE: NORMAL
LVEF ECHO: NORMAL
P AXIS - MUSE: 75 DEGREES
PR INTERVAL - MUSE: 186 MS
QRS DURATION - MUSE: 102 MS
QT - MUSE: 486 MS
QTC - MUSE: 405 MS
R AXIS - MUSE: -28 DEGREES
SYSTOLIC BLOOD PRESSURE - MUSE: NORMAL MMHG
T AXIS - MUSE: 24 DEGREES
VENTRICULAR RATE- MUSE: 42 BPM

## 2021-10-15 PROCEDURE — 250N000011 HC RX IP 250 OP 636: Performed by: INTERNAL MEDICINE

## 2021-10-15 PROCEDURE — 82565 ASSAY OF CREATININE: CPT | Performed by: INTERNAL MEDICINE

## 2021-10-15 PROCEDURE — 36415 COLL VENOUS BLD VENIPUNCTURE: CPT | Performed by: INTERNAL MEDICINE

## 2021-10-15 PROCEDURE — 250N000013 HC RX MED GY IP 250 OP 250 PS 637: Performed by: PHYSICIAN ASSISTANT

## 2021-10-15 PROCEDURE — 99239 HOSP IP/OBS DSCHRG MGMT >30: CPT | Performed by: HOSPITALIST

## 2021-10-15 PROCEDURE — 250N000013 HC RX MED GY IP 250 OP 250 PS 637: Performed by: PODIATRIST

## 2021-10-15 PROCEDURE — 258N000003 HC RX IP 258 OP 636: Performed by: INTERNAL MEDICINE

## 2021-10-15 PROCEDURE — 86140 C-REACTIVE PROTEIN: CPT | Performed by: INTERNAL MEDICINE

## 2021-10-15 PROCEDURE — 93306 TTE W/DOPPLER COMPLETE: CPT | Mod: 26 | Performed by: INTERNAL MEDICINE

## 2021-10-15 PROCEDURE — 99233 SBSQ HOSP IP/OBS HIGH 50: CPT | Mod: 24 | Performed by: INTERNAL MEDICINE

## 2021-10-15 PROCEDURE — 250N000011 HC RX IP 250 OP 636: Performed by: PHYSICIAN ASSISTANT

## 2021-10-15 PROCEDURE — 36573 INSJ PICC RS&I 5 YR+: CPT

## 2021-10-15 PROCEDURE — 250N000011 HC RX IP 250 OP 636: Performed by: HOSPITALIST

## 2021-10-15 PROCEDURE — 250N000009 HC RX 250: Performed by: INTERNAL MEDICINE

## 2021-10-15 PROCEDURE — 93306 TTE W/DOPPLER COMPLETE: CPT

## 2021-10-15 RX ORDER — SACCHAROMYCES BOULARDII 250 MG
250 CAPSULE ORAL 2 TIMES DAILY
Qty: 60 CAPSULE | Refills: 0 | Status: SHIPPED | OUTPATIENT
Start: 2021-10-15 | End: 2021-11-14

## 2021-10-15 RX ORDER — POVIDONE-IODINE 10 MG/G
OINTMENT TOPICAL DAILY
Qty: 28 G | Refills: 0 | Status: SHIPPED | OUTPATIENT
Start: 2021-10-16

## 2021-10-15 RX ORDER — CEFAZOLIN SODIUM 2 G/100ML
2 INJECTION, SOLUTION INTRAVENOUS EVERY 8 HOURS
Start: 2021-10-15

## 2021-10-15 RX ORDER — CEFAZOLIN SODIUM 2 G/100ML
2 INJECTION, SOLUTION INTRAVENOUS EVERY 8 HOURS
Status: DISCONTINUED | OUTPATIENT
Start: 2021-10-15 | End: 2021-10-15 | Stop reason: HOSPADM

## 2021-10-15 RX ADMIN — LIDOCAINE HYDROCHLORIDE 1 ML: 10 INJECTION, SOLUTION EPIDURAL; INFILTRATION; INTRACAUDAL; PERINEURAL at 12:30

## 2021-10-15 RX ADMIN — PRAVASTATIN SODIUM 10 MG: 10 TABLET ORAL at 07:54

## 2021-10-15 RX ADMIN — Medication: at 11:04

## 2021-10-15 RX ADMIN — PIPERACILLIN SODIUM AND TAZOBACTAM SODIUM 4.5 G: 4; .5 INJECTION, POWDER, LYOPHILIZED, FOR SOLUTION INTRAVENOUS at 08:11

## 2021-10-15 RX ADMIN — LISINOPRIL AND HYDROCHLOROTHIAZIDE 1 TABLET: 12.5; 1 TABLET ORAL at 07:54

## 2021-10-15 RX ADMIN — PIPERACILLIN SODIUM AND TAZOBACTAM SODIUM 4.5 G: 4; .5 INJECTION, POWDER, LYOPHILIZED, FOR SOLUTION INTRAVENOUS at 01:34

## 2021-10-15 RX ADMIN — CEFAZOLIN SODIUM 2 G: 2 INJECTION, SOLUTION INTRAVENOUS at 14:17

## 2021-10-15 RX ADMIN — ENOXAPARIN SODIUM 40 MG: 40 INJECTION SUBCUTANEOUS at 15:04

## 2021-10-15 RX ADMIN — VANCOMYCIN HYDROCHLORIDE 1250 MG: 1 INJECTION, POWDER, LYOPHILIZED, FOR SOLUTION INTRAVENOUS at 09:37

## 2021-10-15 RX ADMIN — POVIDONE-IODINE: 100 OINTMENT TOPICAL at 11:04

## 2021-10-15 NOTE — PHARMACY-VANCOMYCIN DOSING SERVICE
"Pharmacy Vancomycin Note  Date of Service 2021  Patient's  1955   66 year old, male    Indication: Osteomyelitis and Skin and Soft Tissue Infection  Day of Therapy: since 10/12/21  Current vancomycin regimen:  1000 mg IV q12h (11.7mg/kg/dose)  Current vancomycin monitoring method: AUC  Current vancomycin therapeutic monitoring goal: 400-600 mg*h/L    Current estimated CrCl = Estimated Creatinine Clearance: 94.1 mL/min (based on SCr of 0.89 mg/dL).    Creatinine for last 3 days  10/12/2021:  4:55 PM Creatinine 0.76 mg/dL  10/13/2021:  6:18 AM Creatinine 0.82 mg/dL  10/14/2021:  6:57 AM Creatinine 0.89 mg/dL;  6:57 AM Creatinine 0.89 mg/dL    Recent Vancomycin Levels (past 3 days)  10/14/2021:  6:01 PM Vancomycin 9.9 mg/L    Vancomycin IV Administrations (past 72 hours)                   vancomycin (VANCOCIN) 1000 mg in dextrose 5% 200 mL PREMIX (mg) 1,000 mg New Bag 10/14/21 0851     1,000 mg New Bag 10/13/21 211     1,000 mg New Bag  0854    vancomycin (VANCOCIN) 1000 mg in dextrose 5% 200 mL PREMIX (mg) 1,000 mg New Bag 10/12/21 2020                Nephrotoxins and other renal medications (From now, onward)    Start     Dose/Rate Route Frequency Ordered Stop    10/14/21 2100  vancomycin (VANCOCIN) 1,250 mg in sodium chloride 0.9 % 250 mL intermittent infusion      1,250 mg  over 90 Minutes Intravenous EVERY 12 HOURS 10/14/21 1921      10/13/21 0800  lisinopril-hydrochlorothiazide (ZESTORETIC) 10-12.5 MG per tablet 1 tablet      1 tablet Oral DAILY 10/12/21 2039      10/12/21 170  piperacillin-tazobactam (ZOSYN) 4.5 g vial to attach to  mL bag     Note to Pharmacy: For [facility], [facility] and [facility]: For Zosyn-naive patients, use the \"Zosyn initial dose + extended infusion\" order panel.    4.5 g  over 30 Minutes Intravenous EVERY 6 HOURS 10/12/21 1705               Contrast Orders - past 72 hours (72h ago, onward)    None          Interpretation of levels and current regimen:  Vancomycin level is " reflective of -600    Has serum creatinine changed greater than 50% in last 72 hours: No    Urine output:  unable to determine    Renal Function: Stable, slight increase though    Old Regimen:  Regimen: 1000 mg IV every 12 hours.  Exposure target: AUC24 (range)400-600 mg/L.hr   AUC24,ss: 419 mg/L.hr  Probability of AUC24 > 400: 59 %  Ctrough,ss: 12.5 mg/L  Probability of Ctrough,ss > 20: 7 %  Probability of nephrotoxicity (Lodise ARIE 2009): 8 %    New Regimen:  Regimen: 1250 mg IV every 12 hours.  Start time: 21:00 on 10/14/2021  Exposure target: AUC24 (range)400-600 mg/L.hr   AUC24,ss: 521 mg/L.hr  Probability of AUC24 > 400: 91 %  Ctrough,ss: 15.8 mg/L  Probability of Ctrough,ss > 20: 23 %  Probability of nephrotoxicity (Lodise ARIE 2009): 11 %    Plan:  1. Increase Dose to 1250mg IV Q12H (14.6mg/kg/dose). AUC for the 1000mg dose was only 419 with a 59% chance of attaining goal AUC. If creatinine continues to uptrend though, may need to decrease back to 1000mg IV Q12H.  2. Vancomycin monitoring method: AUC  3. Vancomycin therapeutic monitoring goal: 400-600 mg*h/L  4. Pharmacy will check vancomycin levels as appropriate in 1-3 Days.  5. Serum creatinine levels will be ordered daily for the first week of therapy and at least twice weekly for subsequent weeks.    Barbara Coffey, PharmD, BCPS

## 2021-10-15 NOTE — DISCHARGE SUMMARY
Discharge Summary    Kareem Bai MRN# [id number]   YOB: 1955 Age: 66 year old     Date of Admission:  10/12/2021  Date of Discharge:  10/15/2021  Admitting Physician:  Fred Lugo MD  Discharge Physician:  Ama Reyes MD   Discharging Service:  Internal Medicine     Primary Provider: Avis Travis          Discharge Diagnosis:     Left toe osteomyelitis             Brief History of Illness:     Kareem Bai is a 66 year old male who was admitted for above.     For more details, please refer to the admission H&P on 10/12/2021             Hospital Course:     Kareem Bai is a 66 year old male with a history of DM2, diabetic neuropathy, HTN, HLD, who was admitted with an infected wound of the left third toe with associated ostemyelitis.      Infected diabetic ulcer of left third toe with osteomyelitis  Diabetic neuropathy  Patient presenting with 7 days of worsening toe redness and swelling. No known trauma. XR with cortical irregularity and moderately displaced comminuted fracture of distal 3rd toe consistent with osteomyelitis. Afebrile and hemodynamically stable.   - Orthopedic Surgery consulted  - Per orthopedic surgery, XR is consistent w/osteomyelitis and do not need MRI. No plans for OR.  - Patient seen by Podiatry 10/14/21 and underwent bedside debridement.  - Blood culture ngtd. Wound culture with staph & strep  - Infectious Disease consulted and following : Cefazolin 2 g Q8H x 6 weeks from date of debridement. Outpatient abx arranged by care coordinator.  - Outpatient follow up with ID & Podiatry     Bradycardia  - This was only an issue on the first day of admission. Patient transferred to telemetry unit. His heart rates were improved. RN assessed patient's HR with activity on 10/14: Hr 57 at rest, up to 75 with walking.   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   - He will follow up with his cardiologist.       DM2  - A1C 5.7% in June.  - Not on hypoglycemic medications at this time. Diet controlled.  - Continue Insulin Sliding Scale     Hypertension  - Continue PTA lisinopril-hydrochlorothiazide daily       Discharge Medications:     Current Discharge Medication List      START taking these medications    Details   Cadexomer Iodine, topical, 0.9% (IODOSORB) 0.9 % GEL gel Apply topically daily  Qty: 10 g, Refills: 0    Associated Diagnoses: Diabetic ulcer of toe of left foot associated with type 2 diabetes mellitus, with fat layer exposed (H)      ceFAZolin (ANCEF) intermittent infusion 2 g in 100 mL dextrose PRE-MIX Inject 100 mLs (2 g) into the vein every 8 hours Continue for 6 weeks (Nov 24, 2021)    Associated Diagnoses: Diabetic ulcer of toe of left foot associated with type 2 diabetes mellitus, with fat layer exposed (H)      povidone-iodine (BETADINE) 10 % ointment Apply topically daily  Qty: 28 g, Refills: 0    Associated Diagnoses: Diabetic ulcer of toe of left foot associated with type 2 diabetes mellitus, with fat layer exposed (H)      saccharomyces boulardii (FLORASTOR) 250 MG capsule Take 1 capsule (250 mg) by mouth 2 times daily  Qty: 60 capsule, Refills: 0    Associated Diagnoses: Diabetic ulcer of toe of left foot associated with type 2 diabetes mellitus, with fat layer exposed (H)         CONTINUE these medications which have NOT CHANGED    Details   gabapentin (NEURONTIN) 300 MG capsule TAKE 1 CAPSULE BY MOUTH THREE TIMES DAILY  Refills: 11      lisinopril-hydrochlorothiazide (PRINZIDE/ZESTORETIC) 20-25 MG per tablet Take 1 tablet by mouth daily       naproxen (NAPROSYN) 500 MG tablet Take 500 mg by mouth 2 times daily as needed for moderate pain       pravastatin (PRAVACHOL) 10 MG tablet Take 10 mg by mouth daily                  Procedures/Consultations:   Invasive procedures: Bedside debridement of left third toe on 10/14/21  Consultation during this admission received from infectious disease,  "orthopedics and Podiatry           Final Day of Progress before Discharge:     Physical Exam:  Blood pressure 115/74, pulse 51, temperature 97.7  F (36.5  C), temperature source Oral, resp. rate 16, height 1.854 m (6' 0.99\"), weight 82.6 kg (182 lb), SpO2 97 %.    Gen: Well Appearing. In NAD.  HEENT: NC/AT, EOMI  Neck: Supple  CV: Normal S1S2, Regular rhythm, normal rate  Lungs: Clear to ascultation b/l  Abd: Soft, NT/ND, +BS  Extremties:  See ortho note for images of affected left third toe. No LE Edema b/l   Neurologic: AAOx3               Condition on Discharge:     Discharge condition: Stable   Code status on discharge: Full Code                Discharge Disposition:     Discharged to home               Pending Results:     Unresulted Labs Ordered in the Past 30 Days of this Admission     Date and Time Order Name Status Description    10/12/2021  4:17 PM Blood Culture Arm, Right Preliminary     10/12/2021  4:17 PM Blood Culture Arm, Left Preliminary                   Discharge Instructions and Follow-Up:     Discharge Procedure Orders   Follow Up ([facility])   Order Comments: Follow up with primary care provider, Avis Travis, within 7 days for hospital follow- up and referral to cardiology.   Follow up with Dr. Lua  (podiatry) in 1-2 weeks  for continuing care of Left Toe Infection.    Follow up with Dr. Kat (Infectious Disease) on 11/3/2021 at 12PM  for ant-biotic management.     Appointments on [place] and/or [facility] (with [facility] or [facility] provider or service). Call [phone] if you haven't heard regarding these appointments within 7 days of discharge.     Reason for your hospital stay   Order Comments: Osteomyelitis of  left third toe.     Activity   Order Comments: Your activity upon discharge: activity as tolerated     Order Specific Question Answer Comments   Is discharge order? Yes      Diet   Order Comments: Follow this diet upon discharge: Orders Placed This " Encounter      Snacks/Supplements Adult: Other; String cheese at all snack times; Between Meals      Combination Diet Regular Diet Adult     Order Specific Question Answer Comments   Is discharge order? Yes           Attestation:  Ama Reyes MD.    Time spent on patient: 35 minutes total including face to face and coordinating care time reviewing current illness, any medication changes, and the care plan for today.

## 2021-10-15 NOTE — PROGRESS NOTES
Care Management Discharge Note    Discharge Date: 10/17/2021       Discharge Disposition:  Home      Discharge Services:  [facility] ([place]    Discharge Transportation: Patient to arrange     Private pay costs discussed: Not applicable    Education Provided on the Discharge Plan: yes   Persons Notified of Discharge Plans: patient   Patient/Family in Agreement with the Plan: yes     Handoff Referral Completed: Yes- External (Will send when AVS is available)     Additional Information:  Patient is discharging home with IV abx- Cefazolin 2 gram IV q8hr for 6 weeks from surgical debridement on 10/14/21. Updated Gisela liaison at [facility]. PICC being placed. Will receive scheduled IV abx dose prior to discharge. Will discuss discharge plan with patient when he is available.     2:26 PM  Made external handoff to patient's PCP. Called patient to discuss discharge plan and he verbalized agreement and understanding. He would like a letter from the hospitalist stating that he has been in the hospital and if he has any restrictions. Notified hospitalist.     ANUSHKA Vega RNCC  RN Care Coordinator   Office: [phone]   Pager: [phone]  Weekend pager: [phone]

## 2021-10-15 NOTE — PROGRESS NOTES
GENERAL INFECTIOUS DISEASES PROGRESS NOTE     Patient:  Kareem Bai   Date of birth 1955, Medical record number [id number]  Date of Visit:  10/15/2021  Date of Admission: 10/12/2021  Consult Requested by: Jerson Estrada, *  Reason for Consult:  infected diabetic ulcer with associated osteomyelitis of left third toe         Assessment and Recommendations:     Kareem Bai is a 66 year old male with medical history significant for diet controlled Diabetes mellitus2 HbA1c 5.7( 6/11/21) with peripheral neuropathy, HTN, HLD, arthritis, Right TKA (2018), occasional cigar smoker, sleep apnea, who was admitted on 10/12/21 with infected left third toe.      ASSESSMENT:  1. Left 3rd toe draining wound with osteomyelitis on Xray 10/12/21  - wound drainage cx - gram stain - 1+ GPC and 3+ WBC, predominant PMN, Cx  positive for 3+ Staph aureus MSSA and 3+ Streptococcus canis   - seen by podiatrist on 10/14/21 and did some callus / bone debridement per patient. procedure note is pending   - Blood cx - neg x 2 (10/12/21)   - CRP 43 , WBC 7.7, ESR 16 (10/12/21)     Xray of left foot 10/12/21  IMPRESSION:  There is cortical irregularity and a moderately displaced comminuted fracture of the distal tuft of the 3rd toe with overlying soft tissue swelling. The findings would be strong evidence of osteomyelitis in the appropriate clinical setting, but could also be related to trauma. No other significant findings.    2. Diet and exercise controlled Diabetes mellitus HbA1c 5.7( 6/11/21) with peripheral neuropathy,  3. Hypertension   4. Hyperlipidemia   5. Right TKA in 2018   6. Occasional cigar smoker   7. Right foot with decreased pedal pulses , cool to touch   8. LAURA  9. Listed as allergic to Ampicillin ( hives) . patient says this happened in early childhood and not sure if this is a real allergic reaction/ due to other causes. He says he had tolerated Amoxicillin and other type of PCN well. He is on Zosyn  without any problems     RECOMMENDATION:  - wound is still draining , but toe is less red, less swollen , no sensation in foot   - can stop Vancomycin IV and Unasyn.  - can place PICC   - switch to Cefazolin 2 gram IV q8hr  ,  duration 6 weeks from surgical debridement on 10/14/21 . Duration may be adjusted after clinic visit  - probiotic daily   - Follow-up with me in ID clinic on 11/3/21 at 12 noon   - weekly lab: BMP, CRP, CBC      - OK to D/C if o/p antibiotic is arranged     Plan discussed with patient, wife and hosiptalist   ID will sign off. Please call with questions     -----------------------------------------------------------------------  Prolonged Parenteral Antibiotic Recommendations and ID Follow up  This template provides final ID recommendations as of this date. If there are clinical changes or questions please call the ID team.     Infectious Diseases Diagnosis : Left 3rd toe draining wound with osteomyelitis     IV antibiotics: Yes    Antibiotic Information  Name of Antibiotic Dose of Antibiotic1 Pharmacy to assist with dosing Y/N Anticipated duration Effective start date2 End date   Cefazolin 2 gram IV q8hr  N 4-6 weeks  10/14/21  4-6 weeks                   1.Dose of antibiotic will need to be renally adjusted if creatinine clearance changes  2.Effective start date is the date of therapy with appropriate spectrum    Method of antibiotic delivery:PICC line. At the end of therapy should the line be removed? otherwill be determined later by MD.     Tentative plans for disposition: Home    Weekly labs required: CBC with diff, BMP and CRP. Dr. Kat will follow labs at discharge until ID follow up. Please have labs faxed to ID clinic fax at [phone].    Appointment to be scheduled: clinic f/u  - 11/3/21 at 12 noon     ID provider to route this note to the appropriate Epic persons and pools: [facility] infectious disease adult [facility], clinic Coordinators-Med-Spec-AMOS, Calixto ADAIR ([facility]),  Jose D Simon (if patient has diagnosis of HIV)    [facility] [place]/ID Clinic Information:  [address] [facility], [address]  [place]  [address]  Phone: [phone]  Fax: [phone]    -------------------------------------------------------------------------------  Gerald Kat MD, M.Med.Sc  Staff, Infectious Diseases   Pager : [phone]    Interval History  feels well. no fever , tolerates Vancomycin IV and Zosyn well. some diarrhea. no abdominal pain .   prefers IV antibiotic. discussed labs and management plan  with patient and wife            History of Present Illness:     Kareem Bai is a 66 year old male with medical history significant for diet controlled Diabetes mellitus2 HbA1c 5.7( 6/11/21) with peripheral neuropathy, HTN, HLD, Right TKA (2018), occasional cigar smoker , sleep apnea, who was admitted on 10/12/21 with infected left third toe.  He started noticing swelling and redness of the left 3rd toe about 7 days prior to admission, then it would go down the next days and started to get redder and more swollen 2 days before he presented to ER. He had nightsweats the night before he came in. He denies any fever or chills at home. He denies any trauma or injury to the foot. He has decreased sensation in the feet. He did not check his feet on a regular basis. He denies noticing any bleeding or discharge from the toe. He walked through a swampy area about 1 week ago , and his shoes are not water resistant,  before noticing redness/ swelling in that toe. He denies wearing new foot wear but always has some problems with his shoes, being loose with some friction with walking. walks 2 dogs on a regular basis. Right great toe- with a callous, with occasional bleeding    He does not have a PCP. of note, he presented to ER on 9/8/21 with fatigue and weight loss. Has lost about 35 lbs/ in 1 year. He has good appetite but watches his diet closely and exercises regularly.  his diabetes  is well controlled with HbA1c 5.7 on 6/11/21. He says his energy level has improved since then.     CRP 43 , WBC 7.7, ESR 16 (10/12/21)   SARSCoV2 PCR - neg on 10/12/21     Xray of left footshowed cortical irregularity and a moderately displaced comminuted fracture of the distal tuft of the 3rd toe with overlying soft tissue swelling. The findings would be strong evidence of osteomyelitis in the appropriate clinical setting, but could also be related to trauma. No other significant findings.     Cultures are pending. He is currently on Pip/Tazobactam and Vancomycin IV and seems to tolerate the antibiotics well.     Imaging:   Xray of left foot 10/12/21  IMPRESSION:  1. There is cortical irregularity and a moderately displaced comminuted fracture of the distal tuft of the 3rd toe with overlying soft tissue swelling. The findings would be strong evidence of osteomyelitis in the appropriate clinical setting, but could also be related to trauma.  2. No other significant findings.          Review of Systems:   CONSTITUTIONAL:  No fevers or chills  EYES: negative for icterus  ENT:  negative for hearing loss, tinnitus and sore throat  RESPIRATORY:  negative for cough with sputum and dyspnea  CARDIOVASCULAR:  negative for chest pain, dyspnea  GASTROINTESTINAL:  negative for nausea, vomiting, diarrhea and constipation  GENITOURINARY:  negative for dysuria  HEME:  No easy bruising  INTEGUMENT:  see HPI   NEURO:  Negative for headache         Past Medical History:     Past Medical History:   Diagnosis Date     Hypertension      Neuropathy      Prediabetes      Sleep apnea           Past Surgical History:     Past Surgical History:   Procedure Laterality Date     OPTICAL TRACKING SYSTEM ARTHROPLASTY KNEE Right 1/4/2018    Procedure: OPTICAL TRACKING SYSTEM ARTHROPLASTY KNEE;  Right Total Knee Arthroplasty;  Surgeon: Marcello Beatty MD;  Location: [facility] OR          Family History:   reviewed, non contributory         Social  History:     Social History     Tobacco Use     Smoking status: Current Some Day Smoker     Types: Cigars     Smokeless tobacco: Never Used     Tobacco comment: 5-6/week   Substance Use Topics     Alcohol use: Yes     Comment: Drink every 2-3 days     History   Sexual Activity     Sexual activity: Not on file     lives in [place] with wife, takes care of 2 dogs , that belong to his son.   retired postman  occasional alcohol and cigar smoker          Current Medications (antimicrobials listed in bold):       Cadexomer Iodine (topical) 0.9%   Topical Daily     enoxaparin ANTICOAGULANT  40 mg Subcutaneous Q24H     insulin aspart  1-3 Units Subcutaneous TID AC     insulin aspart  1-3 Units Subcutaneous At Bedtime     lisinopril-hydrochlorothiazide  1 tablet Oral Daily     piperacillin-tazobactam  4.5 g Intravenous Q6H     povidone-iodine   Topical Daily     pravastatin  10 mg Oral Daily     sodium chloride (PF)  3 mL Intracatheter Q8H     vancomycin  1,250 mg Intravenous Q12H          Allergies:     Allergies   Allergen Reactions     Ampicillin Other (See Comments) and Hives     Codeine GI Disturbance     LW Reaction: gi upset     Simvastatin Other (See Comments)          Physical Exam:   Vitals were reviewed  Patient Vitals for the past 24 hrs:   BP Temp Temp src Pulse Resp SpO2   10/15/21 0746 115/74 -- -- -- -- --   10/15/21 0615 109/74 97.7  F (36.5  C) Oral 51 16 97 %   10/14/21 1436 107/60 98  F (36.7  C) Oral 60 16 95 %     Ranges for his vital signs:  Temp:  [97.7  F (36.5  C)-98  F (36.7  C)] 97.7  F (36.5  C)  Pulse:  [51-60] 51  Resp:  [16] 16  BP: (107-115)/(60-74) 115/74  SpO2:  [95 %-97 %] 97 %    Physical Examination:  GENERAL:  well-developed, well-nourished, in bed in no acute distress.    HEENT:  Head is normocephalic, atraumatic   EYES:  Eyes have anicteric sclerae without conjunctival injection   NECK:  Supple.   LUNGS:  breathing comfortably on room air   SKIN:  Left 3rd toe - less red and  less swollen , still draining.  no rashes else where  Extremities: no edema in lower extremities. good pedal pulses in left foot, warm to touch. Right foot with decreased pedal pulses, cool to touch. right great toe with dark, callous  . arthritic toes.   right TKA . no signs of infection of knee/ ankle joints  PIV Line are in place without any surrounding erythema or exudate. No stigmata of endocarditis.  NEUROLOGIC:  Grossly nonfocal. Active x4 extremities         Laboratory Data:     Inflammatory Markers    Recent Labs   Lab Test 10/12/21  1655   SED 16   CRP 43.0*     Hematology Studies    Recent Labs   Lab Test 10/14/21  0721 10/13/21  0618 10/12/21  1655 09/08/21  1533 01/06/18  0557 01/05/18  0653   WBC 7.6 6.0 7.7 6.8  --   --    HGB 12.1* 11.7* 13.6 12.4* 11.6* 13.1*   MCV 92 91 92 91  --   --     260 296 275  --   --      Immune Globulin Studies    Recent Labs   Lab Test 03/15/19  0915      IGM 38*        Metabolic Studies     Recent Labs   Lab Test 10/15/21  0622 10/14/21  0657 10/13/21  0618 10/12/21  1655 09/08/21  1533 10/29/19  0950   NA  --  140 140 138 140 139   POTASSIUM  --  4.4 4.5 4.0 3.8 4.4   CHLORIDE  --  112* 110* 105 107 106   CO2  --  24 27 31 30 29   BUN  --  24 18 13 23 14   CR 0.92 0.89  0.89 0.82 0.76 0.77 0.79   GFRESTIMATED 86 88  88 >90 >90 >90 >90     Hepatic Studies    Recent Labs   Lab Test 09/08/21  1533   BILITOTAL 0.7   ALKPHOS 53   ALBUMIN 3.3*   AST 15   ALT 24     Thyroid Studies    Recent Labs   Lab Test 03/15/19  0915   TSH 2.42     Urine Studies    Recent Labs   Lab Test 09/08/21  1544 01/04/18  0600   LEUKEST Negative Trace*   WBCU 1 <1

## 2021-10-15 NOTE — PLAN OF CARE
"      VS: /74   Pulse 51   Temp 97.7  F (36.5  C) (Oral)   Resp 16   Ht 1.854 m (6' 0.99\")   Wt 82.6 kg (182 lb)   SpO2 97%   BMI 24.02 kg/m         Output: Voids spontaneously without difficulty.     Lungs: Lung sounds are clear, O2 sats are good on RA   Activity: Independent.    Skin: Skin is intact.  Ulceration of the third toe on the L foot.  Ulcer was pink with a scant amount of drainage.  New dressing applied.    Pain:   No pain reported.    Neuro/CMS:   Alert and oriented x4, numbness in the extremities, strengths are good in the extremities.    Dressing(s):   Dressing over the toe ulceration was changed, is clean dry and intact.    Diet:   Regular diet, tolerates well.    LDA:   PICC in the R upper arm, PIV in the L lower forearm, wound on the third toe of the L foot.    Equipment:   IV pole   Plan:   Plan is for pt to go home with his PICC line and continue antibiotics.    Additional Info:          "

## 2021-10-15 NOTE — PROGRESS NOTES
[facility]  Nurse Liaison    Received referral from MORENITA Colunga for abx therapy.  Benefits verified, Pt has [employer] Federal Employee plan, covered at 100% . Spoke with spouse to Introduced home infusion services, review benefits and offer choice of providers. They wish to stay with [facility] and has chosen [facility].  Provided info on [facility] Services, Including, RN visits, RPH/RN on call 24/7, supplies, and delivery process to home. Educated on how to contact [facility].  She is in agreement with plan and willing and able to learn. She stated they are will be comfortable doing infusion in home environment. He has not been to [facility], yet.  I Liaison will continue to follow until DC for any changes or additional needs. This RN provided patient with [facility] Phone number and will continue to follow through discharge.    Met with patient at bedside. He is expected to DC today. Educated on I role in Pt DC to home.  He states they are comfortable doing home infusion and is able to infusion independently.  IVP  Mock Teach, Taught syringe-air removal to patient.   He went through all steps of IVP ABX administration, flushing and proper sequence of IVP ABX step for administration. He has good technique and understanding, and agrees to contact [facility] for any questions, clarification or further education needs. Educated to keep dressing CDI, and to cover dressing during bathing. Encouraged to call [facility] for any questions, clarifications or problems.  Educated on Deliveries, importance of refrigerating medications. He was engaged during this RN Visit in hospital room.  He states they are comfortable doing Home Infusion.  They understand to expect possible phone calls from [facility].  Education provided on RN/RPH on call 24/7, phone number provided.  Educated to review [facility] folder and contents, including Service Agreement and Consents, and educational Materials.  He verbalizes understanding of above.  DC: today  DC Therapies: Ancef q8, dosing  06/14/22 *will dose 2pm prior to DC  Delivery/Supplies: To pt home   LINES/TUBES: PICC SLV  AGENCY: [facility]  SNV: Saturday  NOTE: pt is not homebound      Gisela Noyola [facility]-Nurse Liaison  Cell:  [phone] Mon-Fri  [email or web address]-24 hrs  [phone]

## 2021-10-15 NOTE — PROGRESS NOTES
[facility] [facility]  Nurse Liaison    Received referral from MORENITA Colunga for abx therapy.  Benefits verified, Pt has [employer] Federal Employee plan, covered at 100% . Spoke with patient to Introduced home infusion services, review benefits and offer choice of providers. He wishes to stay with [facility] and has chosen \[facility]".  Provided info on \[facility]\"" Services, Including, RN visits, RPH/RN on call 24/7, supplies, and delivery process to home. Educated on how to contact \[facility]\"".  He is in agreement with plan and willing and able to learn. He stated they are will be comfortable doing infusion in home environment. He has not been to [facility], yet.  I Liaison will continue to follow until DC for any changes or additional needs. This RN provided patient with \[facility]\"" Phone number and will continue to follow through discharge.    DC: Sun/Mon  DC Therapies: Currently Zosyn q6 and Vanco q12 9/9  Delivery/Supplies: To pt home vs Hosp hookup  LINES/TUBES: PICC tbd  AGENCY: TBD  SNV: tbd  NOTE:       Gisela Noyola, \[facility]\""-Nurse Liaison  Cell:  [phone] [facility]  [email or web address] HOME [facility]ION-24 hrs  [phone]

## 2021-10-15 NOTE — PROCEDURES
Reviewed PICC procedure with patient including side effects, weight restriction, and keeping dressing dry. Consent signed and time out performed with MORENITA Azevedo.      #4F Single lumen power PICC placed in right basilic vein with first attempt. Per 3CG technology, tip terminates at SVC/RA junction. Stat lock placed. Report given to Nora camacho for immediate use. Flushing orders released from Epic.    Ashley Epps RN, BSN

## 2021-10-15 NOTE — PLAN OF CARE
"    VS: /74 (BP Location: Right arm)   Pulse 51   Temp 97.7  F (36.5  C) (Oral)   Resp 16   Ht 1.854 m (6' 0.99\")   Wt 82.6 kg (182 lb)   SpO2 97%   BMI 24.02 kg/m    RA   Output: Voiding w/o difficlty   LBM 10/12   Activity: independent   Skin: L 3rd toe wound   Pain: denied   Neuro/CMS: A&Ox4, baseline neuropathy   Dressing(s): Gauze wrapped around L toe has dried drainage   Diet: regular   LDA: L PIV TKO   Plan: Continue POC, Echo on 10/15, possible discharge home on 10/17 w/ IV antibiotics   Additional Info:        "

## 2021-10-15 NOTE — PLAN OF CARE
Pt. discharged at [facility] to home, was accompanied by significant other, and left with personal belongings. Pt. received complete discharge paperwork and medication filled at [facility] pharmacy. Pt. was given times of last dose for all discharge medications in writing on discharge medication sheets. Discharge teaching included  medication administration, and signs and symptoms of infection. Pt. to follow up with PCP/Cardiologist and ID within 7 days. Pt. had no further questions at the time of discharge and no unmet needs were identified.

## 2021-10-16 ENCOUNTER — HOME INFUSION (PRE-WILLOW HOME INFUSION) (OUTPATIENT)
Dept: PHARMACY | Facility: CLINIC | Age: 66
End: 2021-10-16

## 2021-10-17 LAB
BACTERIA BLD CULT: NO GROWTH
BACTERIA BLD CULT: NO GROWTH

## 2021-10-18 NOTE — PROGRESS NOTES
This is a recent snapshot of the patient's [facility] medical record.  For current drug dose and complete information and questions, call [phone] or In Basket pool, [facility] home infusion ([id number])  CSN Number:  [id number]

## 2021-10-18 NOTE — PROGRESS NOTES
This is a recent snapshot of the patient's [facility] medical record.  For current drug dose and complete information and questions, call [phone] or In Basket pool, fv home infusion ([id number])  CSN Number:  [id number]

## 2021-10-20 ENCOUNTER — TELEPHONE (OUTPATIENT)
Dept: PODIATRY | Facility: CLINIC | Age: 66
End: 2021-10-20

## 2021-10-20 NOTE — TELEPHONE ENCOUNTER
[facility] Call Center    Phone Message    May a detailed message be left on voicemail: yes     Reason for Call Patient called into CS to get 1 week follow up from ER. Doctor has no upcoming appt avail  . Please call Patient to get Him in ASAP with Doctor  Action Taken: Message routed to:  [facility] ([facility]): ump    Travel Screening: Not Applicable

## 2021-10-21 ENCOUNTER — HOME INFUSION (PRE-WILLOW HOME INFUSION) (OUTPATIENT)
Dept: PHARMACY | Facility: CLINIC | Age: 66
End: 2021-10-21

## 2021-10-21 ENCOUNTER — LAB REQUISITION (OUTPATIENT)
Dept: LAB | Facility: CLINIC | Age: 66
End: 2021-10-21
Payer: MEDICARE

## 2021-10-21 DIAGNOSIS — E11.621 TYPE 2 DIABETES MELLITUS WITH FOOT ULCER (CODE) (H): ICD-10-CM

## 2021-10-21 LAB
ANION GAP SERPL CALCULATED.3IONS-SCNC: 1 MMOL/L (ref 3–14)
BASOPHILS # BLD AUTO: 0.1 10E3/UL (ref 0–0.2)
BASOPHILS NFR BLD AUTO: 1 %
BUN SERPL-MCNC: 15 MG/DL (ref 7–30)
CALCIUM SERPL-MCNC: 8.6 MG/DL (ref 8.5–10.1)
CHLORIDE BLD-SCNC: 108 MMOL/L (ref 94–109)
CO2 SERPL-SCNC: 28 MMOL/L (ref 20–32)
CREAT SERPL-MCNC: 0.71 MG/DL (ref 0.66–1.25)
CRP SERPL-MCNC: 5.1 MG/L (ref 0–8)
EOSINOPHIL # BLD AUTO: 0.1 10E3/UL (ref 0–0.7)
EOSINOPHIL NFR BLD AUTO: 1 %
ERYTHROCYTE [DISTWIDTH] IN BLOOD BY AUTOMATED COUNT: 13.2 % (ref 10–15)
GFR SERPL CREATININE-BSD FRML MDRD: >90 ML/MIN/1.73M2
GLUCOSE BLD-MCNC: 75 MG/DL (ref 70–99)
HCT VFR BLD AUTO: 39.9 % (ref 40–53)
HGB BLD-MCNC: 13.5 G/DL (ref 13.3–17.7)
HOLD SPECIMEN: NORMAL
IMM GRANULOCYTES # BLD: 0 10E3/UL
IMM GRANULOCYTES NFR BLD: 0 %
LYMPHOCYTES # BLD AUTO: 1.9 10E3/UL (ref 0.8–5.3)
LYMPHOCYTES NFR BLD AUTO: 23 %
MCH RBC QN AUTO: 30.8 PG (ref 26.5–33)
MCHC RBC AUTO-ENTMCNC: 33.8 G/DL (ref 31.5–36.5)
MCV RBC AUTO: 91 FL (ref 78–100)
MONOCYTES # BLD AUTO: 0.6 10E3/UL (ref 0–1.3)
MONOCYTES NFR BLD AUTO: 7 %
NEUTROPHILS # BLD AUTO: 5.7 10E3/UL (ref 1.6–8.3)
NEUTROPHILS NFR BLD AUTO: 68 %
NRBC # BLD AUTO: 0 10E3/UL
NRBC BLD AUTO-RTO: 0 /100
PLATELET # BLD AUTO: 318 10E3/UL (ref 150–450)
POTASSIUM BLD-SCNC: 4.5 MMOL/L (ref 3.4–5.3)
RBC # BLD AUTO: 4.39 10E6/UL (ref 4.4–5.9)
SODIUM SERPL-SCNC: 137 MMOL/L (ref 133–144)
WBC # BLD AUTO: 8.4 10E3/UL (ref 4–11)

## 2021-10-21 PROCEDURE — 85025 COMPLETE CBC W/AUTO DIFF WBC: CPT | Performed by: INTERNAL MEDICINE

## 2021-10-21 PROCEDURE — 80048 BASIC METABOLIC PNL TOTAL CA: CPT | Performed by: INTERNAL MEDICINE

## 2021-10-21 PROCEDURE — 86140 C-REACTIVE PROTEIN: CPT | Performed by: INTERNAL MEDICINE

## 2021-10-21 NOTE — TELEPHONE ENCOUNTER
Called patient and offered an appointment on Dr. Knox schedule for the 27th of October at 10 AM. Patient accepted and thanked me for the call.

## 2021-10-22 ENCOUNTER — HOME INFUSION (PRE-WILLOW HOME INFUSION) (OUTPATIENT)
Dept: PHARMACY | Facility: CLINIC | Age: 66
End: 2021-10-22
Payer: MEDICARE

## 2021-10-22 NOTE — PROGRESS NOTES
This is a recent snapshot of the patient's [facility] medical record.  For current drug dose and complete information and questions, call [phone] or In Basket pool, fv home infusion ([id number])  CSN Number:  [id number]

## 2021-10-27 ENCOUNTER — OFFICE VISIT (OUTPATIENT)
Dept: ORTHOPEDICS | Facility: CLINIC | Age: 66
End: 2021-10-27
Payer: MEDICARE

## 2021-10-27 ENCOUNTER — LAB (OUTPATIENT)
Dept: LAB | Facility: CLINIC | Age: 66
End: 2021-10-27
Payer: MEDICARE

## 2021-10-27 ENCOUNTER — ANCILLARY PROCEDURE (OUTPATIENT)
Dept: GENERAL RADIOLOGY | Facility: CLINIC | Age: 66
End: 2021-10-27
Attending: PODIATRIST
Payer: MEDICARE

## 2021-10-27 DIAGNOSIS — L97.524 DIABETIC ULCER OF TOE OF LEFT FOOT ASSOCIATED WITH TYPE 2 DIABETES MELLITUS, WITH NECROSIS OF BONE (H): ICD-10-CM

## 2021-10-27 DIAGNOSIS — E11.49 TYPE II OR UNSPECIFIED TYPE DIABETES MELLITUS WITH NEUROLOGICAL MANIFESTATIONS, NOT STATED AS UNCONTROLLED(250.60) (H): ICD-10-CM

## 2021-10-27 DIAGNOSIS — E11.621 DIABETIC ULCER OF TOE OF LEFT FOOT ASSOCIATED WITH TYPE 2 DIABETES MELLITUS, WITH NECROSIS OF BONE (H): Primary | ICD-10-CM

## 2021-10-27 DIAGNOSIS — L97.524 DIABETIC ULCER OF TOE OF LEFT FOOT ASSOCIATED WITH TYPE 2 DIABETES MELLITUS, WITH NECROSIS OF BONE (H): Primary | ICD-10-CM

## 2021-10-27 DIAGNOSIS — E11.42 DIABETIC POLYNEUROPATHY ASSOCIATED WITH TYPE 2 DIABETES MELLITUS (H): ICD-10-CM

## 2021-10-27 DIAGNOSIS — E11.621 DIABETIC ULCER OF TOE OF LEFT FOOT ASSOCIATED WITH TYPE 2 DIABETES MELLITUS, WITH NECROSIS OF BONE (H): ICD-10-CM

## 2021-10-27 LAB — HBA1C MFR BLD: 5.9 % (ref 0–5.6)

## 2021-10-27 PROCEDURE — 11042 DBRDMT SUBQ TIS 1ST 20SQCM/<: CPT | Performed by: PODIATRIST

## 2021-10-27 PROCEDURE — 36415 COLL VENOUS BLD VENIPUNCTURE: CPT | Performed by: PATHOLOGY

## 2021-10-27 PROCEDURE — 83036 HEMOGLOBIN GLYCOSYLATED A1C: CPT | Performed by: PATHOLOGY

## 2021-10-27 PROCEDURE — 73660 X-RAY EXAM OF TOE(S): CPT | Mod: LT | Performed by: RADIOLOGY

## 2021-10-27 NOTE — PROGRESS NOTES
Date of Service: 10/27/2021    Chief Complaint   Patient presents with     Consult     Diabetic wound, left third toe.           HPI: Kareem is a 66 year old male who presents today for further evaluation of left third toe ulcer.  He relates that at the beginning of October, he was biking in the [facility] area.  His toe got wet.  He is diabetic and had neuropathy and did not feel it.  He went to see his primary care doctor who sent him to [facility].  While in-house, he saw Dr. Lua.  X-rays were taken and osteomyelitis was noted.  Infectious disease was consulted while he was in-house.  On discharge, he was switched to cefazolin 2 g IV every 8 hours.  He is on a duration of this for 6 weeks.  This is week 2 for him.    Review of Systems: No n/v/d/f/c/ns/sob/cp    PMH:   Past Medical History:   Diagnosis Date     Hypertension      Neuropathy      Prediabetes      Sleep apnea        PSxH:   Past Surgical History:   Procedure Laterality Date     OPTICAL TRACKING SYSTEM ARTHROPLASTY KNEE Right 1/4/2018    Procedure: OPTICAL TRACKING SYSTEM ARTHROPLASTY KNEE;  Right Total Knee Arthroplasty;  Surgeon: Marcello Beatty MD;  Location:  OR       Allergies: Ampicillin, Codeine, and Simvastatin    SH:   Social History     Socioeconomic History     Marital status:      Spouse name: Not on file     Number of children: Not on file     Years of education: Not on file     Highest education level: Not on file   Occupational History     Not on file   Tobacco Use     Smoking status: Current Some Day Smoker     Types: Cigars     Smokeless tobacco: Never Used     Tobacco comment: 5-6/week   Substance and Sexual Activity     Alcohol use: Yes     Comment: Drink every 2-3 days     Drug use: No     Sexual activity: Not on file   Other Topics Concern     Not on file   Social History Narrative     Not on file     Social Determinants of Health     Financial Resource Strain:      Difficulty of Paying Living Expenses:    Food Insecurity:       Worried About Running Out of Food in the Last Year:      Ran Out of Food in the Last Year:    Transportation Needs:      Lack of Transportation (Medical):      Lack of Transportation (Non-Medical):    Physical Activity:      Days of Exercise per Week:      Minutes of Exercise per Session:    Stress:      Feeling of Stress :    Social Connections:      Frequency of Communication with Friends and Family:      Frequency of Social Gatherings with Friends and Family:      Attends Oriental orthodox Services:      Active Member of Clubs or Organizations:      Attends Club or Organization Meetings:      Marital Status:    Intimate Partner Violence:      Fear of Current or Ex-Partner:      Emotionally Abused:      Physically Abused:      Sexually Abused:        FH: No family history on file.    Objective:    PT and DP pulses are 2/4 bilaterally. CRT is instant. Positive pedal hair.   Gross sensation is diminished bilaterally.  Protective sensation is diminished as demonstrated with a 5.07G monofilament.  Equinus intact bilaterally. No pain with active or passive ROM of the ankle, MTJ, 1st ray, or halluces bilaterally,.       A diabetic wound is noted at left  Distal third digit measuring 0.2 cm x 0.2 cm x 0.1 cm.    Paulino Classification: 2    Wound base: Red/Granulation    Edges: Hyperkeratotic    Drainage: small/serous    Odor: no    Undermining: no    Bone Exposure: No    Clinical Signs of Infection: No    After obtaining patient consent, the wound was irrigated with copious amounts of saline. A scalpel was then used to debride the wound into subutaneous tissue. The wound edges were debrided back to healthy, bleeding tissue. The wound base exhibited healthy bleeding. Given the patient's lack of sensation, no anesthesia was necessary for the procedure.    Barriers to Healing: Neuropathy.  Area of the wound is a high pressure area.  Osteomyelitis.    Treatment Plan: Iodofo and Primapore.  He should continue to use a DH to  shoe.am     Pt Ability to Follow Plan: Unknown.  He relates that he would like to go hunting in 2 weeks.  I relayed to him that I would strongly advise  against doing this.  He does have a bone infection in the toe and this could worsen while he is hunting and he has a greater chance for contamination of the wound.      left 3rd toe xrays indicated in 3 weightbearing views.    Continued osteolysis of the distal phalanx of the third digit.      Assessment: Juan Manuel is a 66-year-old type II diabetic with neuropathy and osteomyelitis of the left third digit.  He is on 6 weeks of antibiotics and he is currently on week 2.  I recommended that he not go on his hunting trip in 2 weeks, as he has a higher chance of contaminating the area.  We also need to get serial x-rays on the toe to see if osteolysis continues.  If it does, he would likely need the distal aspect of the toe amputated.  His last A1c was in June.  We will reorder this for him today.    Plan:  - Pt seen and evaluated.  -X-rays taken and discussed with him.  -Wound was debrided as described.  -Start dressings as above.  -Strongly advise going hunting.  We do need to get serial x-rays to see if the osteolysis continues.  -Continue DH to shoe.  -See again in 2 weeks.

## 2021-10-27 NOTE — LETTER
10/27/2021         RE: Kareem Bai  [address]  [facility] [address]        Dear Colleague,    Thank you for referring your patient, Kareem Bai, to the [facility] [place]. Please see a copy of my visit note below.    Date of Service: 10/27/2021    Chief Complaint   Patient presents with     Consult     Diabetic wound, left third toe.           HPI: Kareem is a 66 year old male who presents today for further evaluation of left third toe ulcer.  He relates that at the beginning of October, he was biking in the [facility] area.  His toe got wet.  He is diabetic and had neuropathy and did not feel it.  He went to see his primary care doctor who sent him to [facility].  While in-house, he saw Dr. Lua.  X-rays were taken and osteomyelitis was noted.  Infectious disease was consulted while he was in-house.  On discharge, he was switched to cefazolin 2 g IV every 8 hours.  He is on a duration of this for 6 weeks.  This is week 2 for him.    Review of Systems: No n/v/d/f/c/ns/sob/cp    PMH:   Past Medical History:   Diagnosis Date     Hypertension      Neuropathy      Prediabetes      Sleep apnea        PSxH:   Past Surgical History:   Procedure Laterality Date     OPTICAL TRACKING SYSTEM ARTHROPLASTY KNEE Right 1/4/2018    Procedure: OPTICAL TRACKING SYSTEM ARTHROPLASTY KNEE;  Right Total Knee Arthroplasty;  Surgeon: Marcello Beatty MD;  Location: [facility] OR       Allergies: Ampicillin, Codeine, and Simvastatin    SH:   Social History     Socioeconomic History     Marital status:      Spouse name: Not on file     Number of children: Not on file     Years of education: Not on file     Highest education level: Not on file   Occupational History     Not on file   Tobacco Use     Smoking status: Current Some Day Smoker     Types: Cigars     Smokeless tobacco: Never Used     Tobacco comment: 5-6/week   Substance and Sexual Activity     Alcohol use: Yes     Comment:  Drink every 2-3 days     Drug use: No     Sexual activity: Not on file   Other Topics Concern     Not on file   Social History Narrative     Not on file     Social Determinants of Health     Financial Resource Strain:      Difficulty of Paying Living Expenses:    Food Insecurity:      Worried About Running Out of Food in the Last Year:      Ran Out of Food in the Last Year:    Transportation Needs:      Lack of Transportation (Medical):      Lack of Transportation (Non-Medical):    Physical Activity:      Days of Exercise per Week:      Minutes of Exercise per Session:    Stress:      Feeling of Stress :    Social Connections:      Frequency of Communication with Friends and Family:      Frequency of Social Gatherings with Friends and Family:      Attends Restoration Services:      Active Member of Clubs or Organizations:      Attends Club or Organization Meetings:      Marital Status:    Intimate Partner Violence:      Fear of Current or Ex-Partner:      Emotionally Abused:      Physically Abused:      Sexually Abused:        FH: No family history on file.    Objective:    PT and DP pulses are 2/4 bilaterally. CRT is instant. Positive pedal hair.   Gross sensation is diminished bilaterally.  Protective sensation is diminished as demonstrated with a 5.07G monofilament.  Equinus intact bilaterally. No pain with active or passive ROM of the ankle, MTJ, 1st ray, or halluces bilaterally,.       A diabetic wound is noted at left  Distal third digit measuring 0.2 cm x 0.2 cm x 0.1 cm.    Paulino Classification: 2    Wound base: Red/Granulation    Edges: Hyperkeratotic    Drainage: small/serous    Odor: no    Undermining: no    Bone Exposure: No    Clinical Signs of Infection: No    After obtaining patient consent, the wound was irrigated with copious amounts of saline. A scalpel was then used to debride the wound into subutaneous tissue. The wound edges were debrided back to healthy, bleeding tissue. The wound base exhibited  healthy bleeding. Given the patient's lack of sensation, no anesthesia was necessary for the procedure.    Barriers to Healing: Neuropathy.  Area of the wound is a high pressure area.  Osteomyelitis.    Treatment Plan: Iodofo and Primapore.  He should continue to use a DH to shoe.am     Pt Ability to Follow Plan: Unknown.  He relates that he would like to go hunting in 2 weeks.  I relayed to him that I would strongly advise  against doing this.  He does have a bone infection in the toe and this could worsen while he is hunting and he has a greater chance for contamination of the wound.      left 3rd toe xrays indicated in 3 weightbearing views.    Continued osteolysis of the distal phalanx of the third digit.      Assessment: Juan Manuel is a 66-year-old type II diabetic with neuropathy and osteomyelitis of the left third digit.  He is on 6 weeks of antibiotics and he is currently on week 2.  I recommended that he not go on his hunting trip in 2 weeks, as he has a higher chance of contaminating the area.  We also need to get serial x-rays on the toe to see if osteolysis continues.  If it does, he would likely need the distal aspect of the toe amputated.  His last A1c was in June.  We will reorder this for him today.    Plan:  - Pt seen and evaluated.  -X-rays taken and discussed with him.  -Wound was debrided as described.  -Start dressings as above.  -Strongly advise going hunting.  We do need to get serial x-rays to see if the osteolysis continues.  -Continue DH to shoe.  -See again in 2 weeks.       Again, thank you for allowing me to participate in the care of your patient.        Sincerely,        Carmine Santacruz DPM

## 2021-10-28 ENCOUNTER — HOME INFUSION (PRE-WILLOW HOME INFUSION) (OUTPATIENT)
Dept: PHARMACY | Facility: CLINIC | Age: 66
End: 2021-10-28

## 2021-10-28 ENCOUNTER — LAB REQUISITION (OUTPATIENT)
Dept: LAB | Facility: CLINIC | Age: 66
End: 2021-10-28
Payer: MEDICARE

## 2021-10-28 DIAGNOSIS — E11.621 TYPE 2 DIABETES MELLITUS WITH FOOT ULCER (CODE) (H): ICD-10-CM

## 2021-10-28 LAB
ANION GAP SERPL CALCULATED.3IONS-SCNC: 3 MMOL/L (ref 3–14)
BASOPHILS # BLD AUTO: 0.1 10E3/UL (ref 0–0.2)
BASOPHILS NFR BLD AUTO: 1 %
BUN SERPL-MCNC: 24 MG/DL (ref 7–30)
CALCIUM SERPL-MCNC: 8.9 MG/DL (ref 8.5–10.1)
CHLORIDE BLD-SCNC: 110 MMOL/L (ref 94–109)
CO2 SERPL-SCNC: 27 MMOL/L (ref 20–32)
CREAT SERPL-MCNC: 0.78 MG/DL (ref 0.66–1.25)
CRP SERPL-MCNC: <2.9 MG/L (ref 0–8)
EOSINOPHIL # BLD AUTO: 0.1 10E3/UL (ref 0–0.7)
EOSINOPHIL NFR BLD AUTO: 2 %
ERYTHROCYTE [DISTWIDTH] IN BLOOD BY AUTOMATED COUNT: 13.2 % (ref 10–15)
GFR SERPL CREATININE-BSD FRML MDRD: >90 ML/MIN/1.73M2
GLUCOSE BLD-MCNC: 78 MG/DL (ref 70–99)
HCT VFR BLD AUTO: 40.2 % (ref 40–53)
HGB BLD-MCNC: 13.6 G/DL (ref 13.3–17.7)
HOLD SPECIMEN: NORMAL
IMM GRANULOCYTES # BLD: 0 10E3/UL
IMM GRANULOCYTES NFR BLD: 0 %
LYMPHOCYTES # BLD AUTO: 2.1 10E3/UL (ref 0.8–5.3)
LYMPHOCYTES NFR BLD AUTO: 32 %
MCH RBC QN AUTO: 30.7 PG (ref 26.5–33)
MCHC RBC AUTO-ENTMCNC: 33.8 G/DL (ref 31.5–36.5)
MCV RBC AUTO: 91 FL (ref 78–100)
MONOCYTES # BLD AUTO: 0.5 10E3/UL (ref 0–1.3)
MONOCYTES NFR BLD AUTO: 7 %
NEUTROPHILS # BLD AUTO: 4 10E3/UL (ref 1.6–8.3)
NEUTROPHILS NFR BLD AUTO: 58 %
NRBC # BLD AUTO: 0 10E3/UL
NRBC BLD AUTO-RTO: 0 /100
PLATELET # BLD AUTO: 308 10E3/UL (ref 150–450)
POTASSIUM BLD-SCNC: 4.3 MMOL/L (ref 3.4–5.3)
RBC # BLD AUTO: 4.43 10E6/UL (ref 4.4–5.9)
SODIUM SERPL-SCNC: 140 MMOL/L (ref 133–144)
WBC # BLD AUTO: 6.8 10E3/UL (ref 4–11)

## 2021-10-28 PROCEDURE — 80048 BASIC METABOLIC PNL TOTAL CA: CPT | Performed by: INTERNAL MEDICINE

## 2021-10-28 PROCEDURE — 85025 COMPLETE CBC W/AUTO DIFF WBC: CPT | Performed by: INTERNAL MEDICINE

## 2021-10-28 PROCEDURE — 86140 C-REACTIVE PROTEIN: CPT | Performed by: INTERNAL MEDICINE

## 2021-10-29 ENCOUNTER — HOME INFUSION (PRE-WILLOW HOME INFUSION) (OUTPATIENT)
Dept: PHARMACY | Facility: CLINIC | Age: 66
End: 2021-10-29
Payer: MEDICARE

## 2021-10-30 NOTE — PROGRESS NOTES
This is a recent snapshot of the patient's [facility] medical record.  For current drug dose and complete information and questions, call [phone] or In Basket pool, [facility] home infusion ([id number])  CSN Number:  [id number]

## 2021-11-03 ENCOUNTER — VIRTUAL VISIT (OUTPATIENT)
Dept: INFECTIOUS DISEASES | Facility: CLINIC | Age: 66
End: 2021-11-03
Attending: INTERNAL MEDICINE
Payer: MEDICARE

## 2021-11-03 DIAGNOSIS — M86.9 OSTEOMYELITIS OF THIRD TOE OF LEFT FOOT (H): Primary | ICD-10-CM

## 2021-11-03 PROCEDURE — 99214 OFFICE O/P EST MOD 30 MIN: CPT | Mod: 95 | Performed by: INTERNAL MEDICINE

## 2021-11-03 PROCEDURE — G0463 HOSPITAL OUTPT CLINIC VISIT: HCPCS | Mod: PN,RTG | Performed by: INTERNAL MEDICINE

## 2021-11-03 ASSESSMENT — PAIN SCALES - GENERAL: PAINLEVEL: NO PAIN (0)

## 2021-11-03 NOTE — PROGRESS NOTES
Juan Manuel is a 66 year old who is being evaluated via a billable video visit.      How would you like to obtain your AVS? Mail a copy  If the video visit is dropped, the invitation should be resent by: Send to e-mail at: [email or web address]  Will anyone else be joining your video visit? No    Video-Visit Details  Video Time : 20 min   Originating Location (pt. Location): Home  Distant Location (provider location):  [facility] INFECTI[facility] DISE[facility] CLIN[facility] [place]   Platform used for Video Visit: [email or web address]    INFECTIOUS DISEASES PROGRESS NOTE    Infectious Diseases Clinic     SUBJECTIVE:                                                      Kareem Bai is a 66 year old male is seen via video for the following health issues: post hospital follow-up ( left 3 rd toe infection/ osteomyelitis)   Kareem Bai is a 66 year old male with medical history significant for diet controlled Diabetes mellitus2 HbA1c 5.7( 6/11/21) with peripheral neuropathy, HTN, HLD, arthritis, Right TKA (2018), occasional cigar smoker, sleep apnea, who was admitted on 10/12/21for infected left third toe.  Culture grew MSSA and Strep canis/ He was on Unasyn and Vancomycin in hospital and discharged on Cefazolin, with tentative end date on 11/25/21 (6 weeks duration).      He is doing well. left 3rd toe is dry, no drainage. no problems with PICC . Tolerates Cefazolin well . has good appetite. Blood sugar readings ~ 110 ( 14 days average)   He will be out of town and wont be able to follow-up in clinic.      Problem list and histories reviewed & adjusted, as indicated.  Additional history: as documented    PAST MEDICAL HISTORY:  Patient  has a past medical history of Hypertension, Neuropathy, Prediabetes, and Sleep apnea. He also has no past medical history of History of blood transfusion.    PAST SURGICAL HISTORY:  Patient  has a past surgical history that includes Optical tracking system arthroplasty knee (Right, 1/4/2018).    CURRENT  MEDICATIONS:  Current Outpatient Medications   Medication Sig Dispense Refill     ceFAZolin (ANCEF) intermittent infusion 2 g in 100 mL dextrose PRE-MIX Inject 100 mLs (2 g) into the vein every 8 hours Continue for 6 weeks (Nov 24, 2021)       lisinopril-hydrochlorothiazide (PRINZIDE/ZESTORETIC) 20-25 MG per tablet Take 1 tablet by mouth daily        naproxen (NAPROSYN) 500 MG tablet Take 500 mg by mouth 2 times daily as needed for moderate pain        pravastatin (PRAVACHOL) 10 MG tablet Take 10 mg by mouth daily        Cadexomer Iodine, topical, 0.9% (IODOSORB) 0.9 % GEL gel Apply topically daily (Patient not taking: Reported on 11/3/2021) 10 g 0     gabapentin (NEURONTIN) 300 MG capsule TAKE 1 CAPSULE BY MOUTH THREE TIMES DAILY (Patient not taking: Reported on 11/3/2021)  11     povidone-iodine (BETADINE) 10 % ointment Apply topically daily (Patient not taking: Reported on 11/3/2021) 28 g 0     saccharomyces boulardii (FLORASTOR) 250 MG capsule Take 1 capsule (250 mg) by mouth 2 times daily (Patient not taking: Reported on 11/3/2021) 60 capsule 0       ALLERGY:  Allergies   Allergen Reactions     Ampicillin Other (See Comments) and Hives     Codeine GI Disturbance     LW Reaction: gi upset     Simvastatin Other (See Comments)     IMMUNIZATION HISTORY:  Immunization History   Administered Date(s) Administered     COVID-19,PF,Pfizer (12+ Yrs) 03/05/2021, 03/29/2021     SOCIAL HISTORY:  Patient  reports that he has been smoking cigars. He has never used smokeless tobacco. He reports current alcohol use. He reports that he does not use drugs.    FAMILY HISTORY:  Patient's family history is not on file.    Labs reviewed in EPIC    OBJECTIVE:                                                    GENERAL: healthy, alert, oriented and no distress  EYES: Eyes grossly normal to inspection  NECK: supple  RESP: breathing comfortably on room air  SKIN: no suspicious lesions or rashes, left 3rd toe looks healed, no redness/  swelling seen   NEURO: mentation intact and speech normal  PSYCH: mentation appears normal, affect normal  PICC site : normal, no redness/ swelling/ tenderness       ASSESSMENT AND PLAN:                                                      1. Left 3rd toe draining wound with osteomyelitis on Xray 10/12/21  - wound drainage cx - gram stain - 1+ GPC and 3+ WBC, predominant PMN, Cx  positive for 3+ Staph aureus MSSA and 3+ Streptococcus canis   - seen by podiatrist on 10/14/21 and did some callus / bone debridement per patient. procedure note is pending   - Blood cx - neg x 2 (10/12/21)   - CRP 43 , WBC 7.7, ESR 16 (10/12/21)      Xray of left foot 10/12/21  IMPRESSION:  There is cortical irregularity and a moderately displaced comminuted fracture of the distal tuft of the 3rd toe with overlying soft tissue swelling. The findings would be strong evidence of osteomyelitis in the appropriate clinical setting, but could also be related to trauma. No other significant findings.     2. Diet and exercise controlled Diabetes mellitus HbA1c 5.7( 6/11/21) with peripheral neuropathy,  3. Hypertension   4. Hyperlipidemia   5. Right TKA in 2018   6. Occasional cigar smoker   7. Right foot with decreased pedal pulses , cool to touch   8. LAURA  9. Listed as allergic to Ampicillin ( hives) . patient says this happened in early childhood and not sure if this is a real allergic reaction/ due to other causes. He says he had tolerated Amoxicillin and other type of PCN well. He is on Zosyn without any problems      RECOMMENDATION:  - continue Cefazolin 2 gram IV q8hr  ,  duration 6 weeks from surgical debridement on 10/14/21 .   - probiotic daily   - weekly lab: BMP, CRP, CBC        video follow-up with me on 11/17/21 at 10.30 am . ( pt will be out of town and not able to follow-up in clinic)     Gerald Kat MD, M.Med.Sc  Division of Infectious Diseases and International Medicine  [facility]

## 2021-11-03 NOTE — LETTER
11/3/2021       RE: Kareem Bai  [address]  [facility] [address]     Dear Colleague,    Thank you for referring your patient, Kareem Bai, to the [facility] INFECTIOUS DISEASE CLINIC [place] at [facility]. Please see a copy of my visit note below.    Juan Manuel is a 66 year old who is being evaluated via a billable video visit.      How would you like to obtain your AVS? Mail a copy  If the video visit is dropped, the invitation should be resent by: Send to e-mail at: [email or web address]  Will anyone else be joining your video visit? No    Video-Visit Details  Video Time : 20 min   Originating Location (pt. Location): Home  Distant Location (provider location):  [facility] INFECTIOUS DISEASE CLINIC [place]   Platform used for Video Visit: VitalFields    INFECTIOUS DISEASES PROGRESS NOTE    Infectious Diseases Clinic     SUBJECTIVE:                                                      Kareem Bai is a 66 year old male is seen via video for the following health issues: post hospital follow-up ( left 3 rd toe infection/ osteomyelitis)   Kareem Bai is a 66 year old male with medical history significant for diet controlled Diabetes mellitus2 HbA1c 5.7( 6/11/21) with peripheral neuropathy, HTN, HLD, arthritis, Right TKA (2018), occasional cigar smoker, sleep apnea, who was admitted on 10/12/21for infected left third toe.  Culture grew MSSA and Strep canis/ He was on Unasyn and Vancomycin in hospital and discharged on Cefazolin, with tentative end date on 11/25/21 (6 weeks duration).      He is doing well. left 3rd toe is dry, no drainage. no problems with PICC . Tolerates Cefazolin well . has good appetite. Blood sugar readings ~ 110 ( 14 days average)   He will be out of town and wont be able to follow-up in clinic.      Problem list and histories reviewed & adjusted, as indicated.  Additional history: as documented    PAST  MEDICAL HISTORY:  Patient  has a past medical history of Hypertension, Neuropathy, Prediabetes, and Sleep apnea. He also has no past medical history of History of blood transfusion.    PAST SURGICAL HISTORY:  Patient  has a past surgical history that includes Optical tracking system arthroplasty knee (Right, 1/4/2018).    CURRENT MEDICATIONS:  Current Outpatient Medications   Medication Sig Dispense Refill     ceFAZolin (ANCEF) intermittent infusion 2 g in 100 mL dextrose PRE-MIX Inject 100 mLs (2 g) into the vein every 8 hours Continue for 6 weeks (Nov 24, 2021)       lisinopril-hydrochlorothiazide (PRINZIDE/ZESTORETIC) 20-25 MG per tablet Take 1 tablet by mouth daily        naproxen (NAPROSYN) 500 MG tablet Take 500 mg by mouth 2 times daily as needed for moderate pain        pravastatin (PRAVACHOL) 10 MG tablet Take 10 mg by mouth daily        Cadexomer Iodine, topical, 0.9% (IODOSORB) 0.9 % GEL gel Apply topically daily (Patient not taking: Reported on 11/3/2021) 10 g 0     gabapentin (NEURONTIN) 300 MG capsule TAKE 1 CAPSULE BY MOUTH THREE TIMES DAILY (Patient not taking: Reported on 11/3/2021)  11     povidone-iodine (BETADINE) 10 % ointment Apply topically daily (Patient not taking: Reported on 11/3/2021) 28 g 0     saccharomyces boulardii (FLORASTOR) 250 MG capsule Take 1 capsule (250 mg) by mouth 2 times daily (Patient not taking: Reported on 11/3/2021) 60 capsule 0       ALLERGY:  Allergies   Allergen Reactions     Ampicillin Other (See Comments) and Hives     Codeine GI Disturbance     LW Reaction: gi upset     Simvastatin Other (See Comments)     IMMUNIZATION HISTORY:  Immunization History   Administered Date(s) Administered     COVID-19,PF,Pfizer (12+ Yrs) 03/05/2021, 03/29/2021     SOCIAL HISTORY:  Patient  reports that he has been smoking cigars. He has never used smokeless tobacco. He reports current alcohol use. He reports that he does not use drugs.    FAMILY HISTORY:  Patient's family history is  not on file.    Labs reviewed in EPIC    OBJECTIVE:                                                    GENERAL: healthy, alert, oriented and no distress  EYES: Eyes grossly normal to inspection  NECK: supple  RESP: breathing comfortably on room air  SKIN: no suspicious lesions or rashes, left 3rd toe looks healed, no redness/ swelling seen   NEURO: mentation intact and speech normal  PSYCH: mentation appears normal, affect normal  PICC site : normal, no redness/ swelling/ tenderness       ASSESSMENT AND PLAN:                                                      1. Left 3rd toe draining wound with osteomyelitis on Xray 10/12/21  - wound drainage cx - gram stain - 1+ GPC and 3+ WBC, predominant PMN, Cx  positive for 3+ Staph aureus MSSA and 3+ Streptococcus canis   - seen by podiatrist on 10/14/21 and did some callus / bone debridement per patient. procedure note is pending   - Blood cx - neg x 2 (10/12/21)   - CRP 43 , WBC 7.7, ESR 16 (10/12/21)      Xray of left foot 10/12/21  IMPRESSION:  There is cortical irregularity and a moderately displaced comminuted fracture of the distal tuft of the 3rd toe with overlying soft tissue swelling. The findings would be strong evidence of osteomyelitis in the appropriate clinical setting, but could also be related to trauma. No other significant findings.     2. Diet and exercise controlled Diabetes mellitus HbA1c 5.7( 6/11/21) with peripheral neuropathy,  3. Hypertension   4. Hyperlipidemia   5. Right TKA in 2018   6. Occasional cigar smoker   7. Right foot with decreased pedal pulses , cool to touch   8. LAURA  9. Listed as allergic to Ampicillin ( hives) . patient says this happened in early childhood and not sure if this is a real allergic reaction/ due to other causes. He says he had tolerated Amoxicillin and other type of PCN well. He is on Zosyn without any problems      RECOMMENDATION:  - continue Cefazolin 2 gram IV q8hr  ,  duration 6 weeks from surgical debridement on  10/14/21 .   - probiotic daily   - weekly lab: BMP, CRP, CBC        video follow-up with me on 11/17/21 at 10.30 am . ( pt will be out of town and not able to follow-up in clinic)     Gerald Kat MD, M.Med.Sc  Division of Infectious Diseases and International Medicine  [facility]

## 2021-11-04 ENCOUNTER — LAB REQUISITION (OUTPATIENT)
Dept: LAB | Facility: CLINIC | Age: 66
End: 2021-11-04
Payer: MEDICARE

## 2021-11-04 ENCOUNTER — HOME INFUSION (PRE-WILLOW HOME INFUSION) (OUTPATIENT)
Dept: PHARMACY | Facility: CLINIC | Age: 66
End: 2021-11-04
Payer: MEDICARE

## 2021-11-04 DIAGNOSIS — E11.621 TYPE 2 DIABETES MELLITUS WITH FOOT ULCER (CODE) (H): ICD-10-CM

## 2021-11-04 LAB
ANION GAP SERPL CALCULATED.3IONS-SCNC: 6 MMOL/L (ref 3–14)
BASOPHILS # BLD AUTO: 0.1 10E3/UL (ref 0–0.2)
BASOPHILS NFR BLD AUTO: 1 %
BUN SERPL-MCNC: 23 MG/DL (ref 7–30)
CALCIUM SERPL-MCNC: 8.8 MG/DL (ref 8.5–10.1)
CHLORIDE BLD-SCNC: 104 MMOL/L (ref 94–109)
CO2 SERPL-SCNC: 26 MMOL/L (ref 20–32)
CREAT SERPL-MCNC: 0.74 MG/DL (ref 0.66–1.25)
CRP SERPL-MCNC: <2.9 MG/L (ref 0–8)
EOSINOPHIL # BLD AUTO: 0.3 10E3/UL (ref 0–0.7)
EOSINOPHIL NFR BLD AUTO: 4 %
ERYTHROCYTE [DISTWIDTH] IN BLOOD BY AUTOMATED COUNT: 13.4 % (ref 10–15)
GFR SERPL CREATININE-BSD FRML MDRD: >90 ML/MIN/1.73M2
GLUCOSE BLD-MCNC: 92 MG/DL (ref 70–99)
HCT VFR BLD AUTO: 42.1 % (ref 40–53)
HGB BLD-MCNC: 14.1 G/DL (ref 13.3–17.7)
HOLD SPECIMEN: NORMAL
IMM GRANULOCYTES # BLD: 0 10E3/UL
IMM GRANULOCYTES NFR BLD: 0 %
LYMPHOCYTES # BLD AUTO: 1.7 10E3/UL (ref 0.8–5.3)
LYMPHOCYTES NFR BLD AUTO: 29 %
MCH RBC QN AUTO: 30.5 PG (ref 26.5–33)
MCHC RBC AUTO-ENTMCNC: 33.5 G/DL (ref 31.5–36.5)
MCV RBC AUTO: 91 FL (ref 78–100)
MONOCYTES # BLD AUTO: 0.4 10E3/UL (ref 0–1.3)
MONOCYTES NFR BLD AUTO: 7 %
NEUTROPHILS # BLD AUTO: 3.5 10E3/UL (ref 1.6–8.3)
NEUTROPHILS NFR BLD AUTO: 59 %
NRBC # BLD AUTO: 0 10E3/UL
NRBC BLD AUTO-RTO: 0 /100
PLATELET # BLD AUTO: 281 10E3/UL (ref 150–450)
POTASSIUM BLD-SCNC: 4.6 MMOL/L (ref 3.4–5.3)
RBC # BLD AUTO: 4.62 10E6/UL (ref 4.4–5.9)
SODIUM SERPL-SCNC: 136 MMOL/L (ref 133–144)
WBC # BLD AUTO: 6 10E3/UL (ref 4–11)

## 2021-11-04 PROCEDURE — 86140 C-REACTIVE PROTEIN: CPT | Performed by: INTERNAL MEDICINE

## 2021-11-04 PROCEDURE — 80048 BASIC METABOLIC PNL TOTAL CA: CPT | Performed by: INTERNAL MEDICINE

## 2021-11-04 PROCEDURE — 85025 COMPLETE CBC W/AUTO DIFF WBC: CPT | Performed by: INTERNAL MEDICINE

## 2021-11-05 ENCOUNTER — HOME INFUSION (PRE-WILLOW HOME INFUSION) (OUTPATIENT)
Dept: PHARMACY | Facility: CLINIC | Age: 66
End: 2021-11-05
Payer: MEDICARE

## 2021-11-10 ENCOUNTER — HOME INFUSION (PRE-WILLOW HOME INFUSION) (OUTPATIENT)
Dept: PHARMACY | Facility: CLINIC | Age: 66
End: 2021-11-10

## 2021-11-10 ENCOUNTER — ANCILLARY PROCEDURE (OUTPATIENT)
Dept: GENERAL RADIOLOGY | Facility: CLINIC | Age: 66
End: 2021-11-10
Attending: PODIATRIST
Payer: MEDICARE

## 2021-11-10 ENCOUNTER — OFFICE VISIT (OUTPATIENT)
Dept: ORTHOPEDICS | Facility: CLINIC | Age: 66
End: 2021-11-10
Payer: MEDICARE

## 2021-11-10 ENCOUNTER — LAB REQUISITION (OUTPATIENT)
Dept: LAB | Facility: CLINIC | Age: 66
End: 2021-11-10
Payer: MEDICARE

## 2021-11-10 DIAGNOSIS — E11.621 TYPE 2 DIABETES MELLITUS WITH FOOT ULCER (CODE) (H): ICD-10-CM

## 2021-11-10 DIAGNOSIS — E11.42 DIABETIC POLYNEUROPATHY ASSOCIATED WITH TYPE 2 DIABETES MELLITUS (H): ICD-10-CM

## 2021-11-10 DIAGNOSIS — E11.49 TYPE II OR UNSPECIFIED TYPE DIABETES MELLITUS WITH NEUROLOGICAL MANIFESTATIONS, NOT STATED AS UNCONTROLLED(250.60) (H): ICD-10-CM

## 2021-11-10 DIAGNOSIS — L97.524 DIABETIC ULCER OF TOE OF LEFT FOOT ASSOCIATED WITH TYPE 2 DIABETES MELLITUS, WITH NECROSIS OF BONE (H): Primary | ICD-10-CM

## 2021-11-10 DIAGNOSIS — L97.524 DIABETIC ULCER OF TOE OF LEFT FOOT ASSOCIATED WITH TYPE 2 DIABETES MELLITUS, WITH NECROSIS OF BONE (H): ICD-10-CM

## 2021-11-10 DIAGNOSIS — E11.621 DIABETIC ULCER OF TOE OF LEFT FOOT ASSOCIATED WITH TYPE 2 DIABETES MELLITUS, WITH NECROSIS OF BONE (H): Primary | ICD-10-CM

## 2021-11-10 DIAGNOSIS — E11.621 DIABETIC ULCER OF TOE OF LEFT FOOT ASSOCIATED WITH TYPE 2 DIABETES MELLITUS, WITH NECROSIS OF BONE (H): ICD-10-CM

## 2021-11-10 LAB
ANION GAP SERPL CALCULATED.3IONS-SCNC: 3 MMOL/L (ref 3–14)
BASOPHILS # BLD AUTO: 0.1 10E3/UL (ref 0–0.2)
BASOPHILS NFR BLD AUTO: 1 %
BUN SERPL-MCNC: 20 MG/DL (ref 7–30)
CALCIUM SERPL-MCNC: 8.7 MG/DL (ref 8.5–10.1)
CHLORIDE BLD-SCNC: 108 MMOL/L (ref 94–109)
CO2 SERPL-SCNC: 29 MMOL/L (ref 20–32)
CREAT SERPL-MCNC: 0.69 MG/DL (ref 0.66–1.25)
CRP SERPL-MCNC: <2.9 MG/L (ref 0–8)
EOSINOPHIL # BLD AUTO: 0.1 10E3/UL (ref 0–0.7)
EOSINOPHIL NFR BLD AUTO: 2 %
ERYTHROCYTE [DISTWIDTH] IN BLOOD BY AUTOMATED COUNT: 12.8 % (ref 10–15)
GFR SERPL CREATININE-BSD FRML MDRD: >90 ML/MIN/1.73M2
GLUCOSE BLD-MCNC: 80 MG/DL (ref 70–99)
HCT VFR BLD AUTO: 38.8 % (ref 40–53)
HGB BLD-MCNC: 13.3 G/DL (ref 13.3–17.7)
HOLD SPECIMEN: NORMAL
IMM GRANULOCYTES # BLD: 0 10E3/UL
IMM GRANULOCYTES NFR BLD: 0 %
LYMPHOCYTES # BLD AUTO: 2 10E3/UL (ref 0.8–5.3)
LYMPHOCYTES NFR BLD AUTO: 25 %
MCH RBC QN AUTO: 30.9 PG (ref 26.5–33)
MCHC RBC AUTO-ENTMCNC: 34.3 G/DL (ref 31.5–36.5)
MCV RBC AUTO: 90 FL (ref 78–100)
MONOCYTES # BLD AUTO: 0.6 10E3/UL (ref 0–1.3)
MONOCYTES NFR BLD AUTO: 7 %
NEUTROPHILS # BLD AUTO: 5.4 10E3/UL (ref 1.6–8.3)
NEUTROPHILS NFR BLD AUTO: 65 %
NRBC # BLD AUTO: 0 10E3/UL
NRBC BLD AUTO-RTO: 0 /100
PLATELET # BLD AUTO: 259 10E3/UL (ref 150–450)
POTASSIUM BLD-SCNC: 4.1 MMOL/L (ref 3.4–5.3)
RBC # BLD AUTO: 4.3 10E6/UL (ref 4.4–5.9)
SODIUM SERPL-SCNC: 140 MMOL/L (ref 133–144)
WBC # BLD AUTO: 8.2 10E3/UL (ref 4–11)

## 2021-11-10 PROCEDURE — 86140 C-REACTIVE PROTEIN: CPT | Performed by: INTERNAL MEDICINE

## 2021-11-10 PROCEDURE — 99213 OFFICE O/P EST LOW 20 MIN: CPT | Performed by: PODIATRIST

## 2021-11-10 PROCEDURE — 85025 COMPLETE CBC W/AUTO DIFF WBC: CPT | Performed by: INTERNAL MEDICINE

## 2021-11-10 PROCEDURE — 80048 BASIC METABOLIC PNL TOTAL CA: CPT | Performed by: INTERNAL MEDICINE

## 2021-11-10 PROCEDURE — 36592 COLLECT BLOOD FROM PICC: CPT | Performed by: INTERNAL MEDICINE

## 2021-11-10 PROCEDURE — 73660 X-RAY EXAM OF TOE(S): CPT | Mod: LT | Performed by: RADIOLOGY

## 2021-11-10 NOTE — NURSING NOTE
Reason For Visit:   Chief Complaint   Patient presents with     RECHECK     2 week follow up. Diabetic wound, left third toe.        Pain Assessment  Patient Currently in Pain: Denies        Allergies   Allergen Reactions     Ampicillin Other (See Comments) and Hives     Codeine GI Disturbance     LW Reaction: gi upset     Simvastatin Other (See Comments)           Mora Field LPN

## 2021-11-10 NOTE — LETTER
11/10/2021         RE: Kareem Bai  [address]  [facility] [address]        Dear Colleague,    Thank you for referring your patient, Kareem Bai, to the [facility] [place]. Please see a copy of my visit note below.    Chief Complaint   Patient presents with     RECHECK     2 week follow up. Diabetic wound, left third toe.             HPI: Kareem is a 66 year old male who presents today for further evaluation of left third toe ulcer.  He relates that he has been keeping area covered with the foams.  No new lower extremity complaints.    Review of Systems: No n/v/d/f/c/ns/sob/cp    PMH:   Past Medical History:   Diagnosis Date     Hypertension      Neuropathy      Prediabetes      Sleep apnea        PSxH:   Past Surgical History:   Procedure Laterality Date     OPTICAL TRACKING SYSTEM ARTHROPLASTY KNEE Right 1/4/2018    Procedure: OPTICAL TRACKING SYSTEM ARTHROPLASTY KNEE;  Right Total Knee Arthroplasty;  Surgeon: Marcello Beatty MD;  Location: [facility] OR       Allergies: Ampicillin, Codeine, and Simvastatin    SH:   Social History     Socioeconomic History     Marital status:      Spouse name: Not on file     Number of children: Not on file     Years of education: Not on file     Highest education level: Not on file   Occupational History     Not on file   Tobacco Use     Smoking status: Current Some Day Smoker     Types: Cigars     Smokeless tobacco: Never Used     Tobacco comment: 5-6/week   Substance and Sexual Activity     Alcohol use: Yes     Comment: Drink every 2-3 days     Drug use: No     Sexual activity: Not on file   Other Topics Concern     Not on file   Social History Narrative     Not on file     Social Determinants of Health     Financial Resource Strain: Not on file   Food Insecurity: Not on file   Transportation Needs: Not on file   Physical Activity: Not on file   Stress: Not on file   Social Connections: Not on file   Intimate  Partner Violence: Not on file   Housing Stability: Not on file       FH: No family history on file.    Objective:    PT and DP pulses are 2/4 bilaterally. CRT is instant. Positive pedal hair.   Gross sensation is diminished bilaterally.  Protective sensation is diminished as demonstrated with a 5.07G monofilament.  Equinus intact bilaterally. No pain with active or passive ROM of the ankle, MTJ, 1st ray, or halluces bilaterally,.       A diabetic wound is noted at left  Distal third digit has healed over.  There is a tyloma to the area.  This was sharply debrided.  No open lesion noted underneath this.      left 3rd toe xrays indicated in 3 weightbearing views.    No new osteolysis of the distal phalanx of the third digit.  There does appear to be some new cortex forming.      Assessment: Juan Manuel is a 66-year-old type II diabetic with neuropathy and osteomyelitis of the left third digit.  He is on 6 weeks of antibiotics and he is currently on week 4.  Wound is healed over.  The bone does look better on x-ray.    Plan:  - Pt seen and evaluated.  -X-rays taken and discussed with him.  -Tyloma was debrided as described.  -He can keep the area covered with a bordered gauze.  -We do need to get serial x-rays to see if the osteolysis continues.  -Continue DH to shoe.  -See again in 2 weeks.           Carmine Santacruz DPM

## 2021-11-10 NOTE — PROGRESS NOTES
Chief Complaint   Patient presents with     RECHECK     2 week follow up. Diabetic wound, left third toe.             HPI: Kareem is a 66 year old male who presents today for further evaluation of left third toe ulcer.  He relates that he has been keeping area covered with the foams.  No new lower extremity complaints.    Review of Systems: No n/v/d/f/c/ns/sob/cp    PMH:   Past Medical History:   Diagnosis Date     Hypertension      Neuropathy      Prediabetes      Sleep apnea        PSxH:   Past Surgical History:   Procedure Laterality Date     OPTICAL TRACKING SYSTEM ARTHROPLASTY KNEE Right 1/4/2018    Procedure: OPTICAL TRACKING SYSTEM ARTHROPLASTY KNEE;  Right Total Knee Arthroplasty;  Surgeon: Marcello Beatty MD;  Location: [facility]       Allergies: Ampicillin, Codeine, and Simvastatin    SH:   Social History     Socioeconomic History     Marital status:      Spouse name: Not on file     Number of children: Not on file     Years of education: Not on file     Highest education level: Not on file   Occupational History     Not on file   Tobacco Use     Smoking status: Current Some Day Smoker     Types: Cigars     Smokeless tobacco: Never Used     Tobacco comment: 5-6/week   Substance and Sexual Activity     Alcohol use: Yes     Comment: Drink every 2-3 days     Drug use: No     Sexual activity: Not on file   Other Topics Concern     Not on file   Social History Narrative     Not on file     Social Determinants of Health     Financial Resource Strain: Not on file   Food Insecurity: Not on file   Transportation Needs: Not on file   Physical Activity: Not on file   Stress: Not on file   Social Connections: Not on file   Intimate Partner Violence: Not on file   Housing Stability: Not on file       FH: No family history on file.    Objective:    PT and DP pulses are 2/4 bilaterally. CRT is instant. Positive pedal hair.   Gross sensation is diminished bilaterally.  Protective sensation is diminished as  demonstrated with a 5.07G monofilament.  Equinus intact bilaterally. No pain with active or passive ROM of the ankle, MTJ, 1st ray, or halluces bilaterally,.       A diabetic wound is noted at left  Distal third digit has healed over.  There is a tyloma to the area.  This was sharply debrided.  No open lesion noted underneath this.      left 3rd toe xrays indicated in 3 weightbearing views.    No new osteolysis of the distal phalanx of the third digit.  There does appear to be some new cortex forming.      Assessment: Juan Manuel is a 66-year-old type II diabetic with neuropathy and osteomyelitis of the left third digit.  He is on 6 weeks of antibiotics and he is currently on week 4.  Wound is healed over.  The bone does look better on x-ray.    Plan:  - Pt seen and evaluated.  -X-rays taken and discussed with him.  -Tyloma was debrided as described.  -He can keep the area covered with a bordered gauze.  -We do need to get serial x-rays to see if the osteolysis continues.  -Continue DH to shoe.  -See again in 2 weeks.

## 2021-11-11 NOTE — PROGRESS NOTES
This is a recent snapshot of the patient's [facility] medical record.  For current drug dose and complete information and questions, call [phone] or In Basket pool, fv home infusion ([id number])  CSN Number:  [id number]

## 2021-11-12 ENCOUNTER — HOME INFUSION (PRE-WILLOW HOME INFUSION) (OUTPATIENT)
Dept: PHARMACY | Facility: CLINIC | Age: 66
End: 2021-11-12
Payer: MEDICARE

## 2021-11-17 ENCOUNTER — VIRTUAL VISIT (OUTPATIENT)
Dept: INFECTIOUS DISEASES | Facility: CLINIC | Age: 66
End: 2021-11-17
Attending: INTERNAL MEDICINE
Payer: MEDICARE

## 2021-11-17 DIAGNOSIS — M86.9 OSTEOMYELITIS OF THIRD TOE OF LEFT FOOT (H): Primary | ICD-10-CM

## 2021-11-17 DIAGNOSIS — L97.522 DIABETIC ULCER OF TOE OF LEFT FOOT ASSOCIATED WITH TYPE 2 DIABETES MELLITUS, WITH FAT LAYER EXPOSED (H): ICD-10-CM

## 2021-11-17 DIAGNOSIS — E11.40 TYPE 2 DIABETES MELLITUS WITH DIABETIC NEUROPATHY, WITHOUT LONG-TERM CURRENT USE OF INSULIN (H): ICD-10-CM

## 2021-11-17 DIAGNOSIS — E11.621 DIABETIC ULCER OF TOE OF LEFT FOOT ASSOCIATED WITH TYPE 2 DIABETES MELLITUS, WITH FAT LAYER EXPOSED (H): ICD-10-CM

## 2021-11-17 PROCEDURE — G0463 HOSPITAL OUTPT CLINIC VISIT: HCPCS | Mod: PN,RTG | Performed by: INTERNAL MEDICINE

## 2021-11-17 PROCEDURE — 99214 OFFICE O/P EST MOD 30 MIN: CPT | Mod: 95 | Performed by: INTERNAL MEDICINE

## 2021-11-17 NOTE — LETTER
11/17/2021       RE: Kareem Bai  [address]  [facility] [address]     Dear Colleague,    Thank you for referring your patient, Kareem Bai, to the [facility] [place] [facility]. Please see a copy of my visit note below.    Juan Manuel is a 66 year old who is being evaluated via a billable video visit.    [facility] [phone]  How would you like to obtain your AVS? MyChart  If the video visit is dropped, the invitation should be resent by: Send to e-mail at: [email or web address]  Will anyone else be joining your video visit? No    Juan Manuel is a 66 year old who is being evaluated via a billable video visit.      How would you like to obtain your AVS? Mail a copy  If the video visit is dropped, the invitation should be resent by: Send to e-mail at: [email or web address]  Will anyone else be joining your video visit? No    INFECTIOUS DISEASES PROGRESS NOTE    Infectious Diseases Clinic     SUBJECTIVE:                                                      Kareem Bai is a 66 year old male is seen via video for the following health issues: post hospital follow-up ( left 3 rd toe infection/ osteomyelitis)   Kareem Bai is a 66 year old male with medical history significant for diet controlled Diabetes mellitus2 HbA1c 5.7( 6/11/21) with peripheral neuropathy, HTN, HLD, arthritis, Right TKA (2018), occasional cigar smoker, sleep apnea, who was admitted on 10/12/21for infected left third toe.  Culture grew MSSA and Strep canis/ He was on Unasyn and Vancomycin in hospital and discharged on Cefazolin, with tentative end date on 11/25/21 (6 weeks duration).      He is doing well. afebrile. left 3rd toe is dry, no drainage, scabbed over. no pain.  no problems with PICC . Tolerates Cefazolin well . has good appetite. Blood sugar readings 100-109 range [place].     discussed labs     Problem list and  histories reviewed & adjusted, as indicated.  Additional history: as documented    PAST MEDICAL HISTORY:  Patient  has a past medical history of Hypertension, Neuropathy, Prediabetes, and Sleep apnea.    He has no past medical history of History of blood transfusion.    PAST SURGICAL HISTORY:  Patient  has a past surgical history that includes Optical tracking system arthroplasty knee (Right, 1/4/2018).    CURRENT MEDICATIONS:  Current Outpatient Medications   Medication Sig Dispense Refill     lisinopril-hydrochlorothiazide (PRINZIDE/ZESTORETIC) 20-25 MG per tablet Take 1 tablet by mouth daily        naproxen (NAPROSYN) 500 MG tablet Take 500 mg by mouth 2 times daily as needed for moderate pain        pravastatin (PRAVACHOL) 10 MG tablet Take 10 mg by mouth daily        Cadexomer Iodine, topical, 0.9% (IODOSORB) 0.9 % GEL gel Apply topically daily (Patient not taking: Reported on 11/3/2021) 10 g 0     ceFAZolin (ANCEF) intermittent infusion 2 g in 100 mL dextrose PRE-MIX Inject 100 mLs (2 g) into the vein every 8 hours Continue for 6 weeks (Nov 24, 2021) (Patient not taking: Reported on 11/17/2021)       gabapentin (NEURONTIN) 300 MG capsule TAKE 1 CAPSULE BY MOUTH THREE TIMES DAILY (Patient not taking: Reported on 11/3/2021)  11     povidone-iodine (BETADINE) 10 % ointment Apply topically daily (Patient not taking: Reported on 11/3/2021) 28 g 0     ALLERGY:  Allergies   Allergen Reactions     Ampicillin Other (See Comments) and Hives     Codeine GI Disturbance     LW Reaction: gi upset     Simvastatin Other (See Comments)     IMMUNIZATION HISTORY:  Immunization History   Administered Date(s) Administered     COVID-19,PF,Pfizer (12+ Yrs) 03/05/2021, 03/29/2021     Labs reviewed in EPIC    OBJECTIVE:                                                    GENERAL: healthy, alert, oriented and no distress  EYES: Eyes grossly normal to inspection  NECK: supple  RESP: breathing comfortably on room air  SKIN: no suspicious  lesions or rashes, left 3rd toe looks healed, no redness/ swelling/ bleeding/drainage seen   NEURO: mentation intact and speech normal  PSYCH: mentation appears normal, affect normal  PICC site : normal, no redness/ swelling/ tenderness       ASSESSMENT AND PLAN:                                                      1. Left 3rd toe draining wound with osteomyelitis on Xray 10/12/21 --> healed   - wound drainage cx - gram stain - 1+ GPC and 3+ WBC, predominant PMN, Cx  positive for 3+ Staph aureus MSSA and 3+ Streptococcus canis   - Blood cx - neg x 2 (10/12/21)   - CRP 43 , WBC 7.7, ESR 16 (10/12/21) --> CRP 5.1( 10/21)--> <2.9 (10/28) --> <2.9 (11/4) --> <2.9 (11/10)     Xray of left foot 10/12/21  IMPRESSION:  There is cortical irregularity and a moderately displaced comminuted fracture of the distal tuft of the 3rd toe with overlying soft tissue swelling. The findings would be strong evidence of osteomyelitis in the appropriate clinical setting, but could also be related to trauma. No other significant findings.     2. Diet and exercise controlled Diabetes mellitus HbA1c 5.9( 10/27/21) with peripheral neuropathy,  3. Hypertension   4. Hyperlipidemia   5. Right TKA in 2018   6. Occasional cigar smoker   7. Right foot with decreased pedal pulses , cool to touch   8. LAURA  9. Listed as allergic to Ampicillin ( hives) . patient says this happened in early childhood and not sure if this is a real allergic reaction/ due to other causes. He says he had tolerated Amoxicillin and other type of PCN well. He tolerated Zosyn without any problems , tolerates Cefazolin well      RECOMMENDATION:  - continue Cefazolin 2 gram IV q8hr  ,  duration 6 weeks from surgical debridement on 10/14/21 . ( will complete treatment on 11/25/21) and can remove PICC after that. [facility] pharmacist- informed   - probiotic daily   - weekly lab: BMP, CRP, CBC        has a f/u with Podiatry next week. Follow-up with me/ID as needed basis         Again,  thank you for allowing me to participate in the care of your patient.      Sincerely,    Gerald Kat MD

## 2021-11-17 NOTE — PROGRESS NOTES
Juan Manuel is a 66 year old who is being evaluated via a billable video visit.    [facility] [phone]  How would you like to obtain your AVS? MyChart  If the video visit is dropped, the invitation should be resent by: Send to e-mail at: [email or web address]  Will anyone else be joining your video visit? No    Video-Visit Details  Video Time: 11 min   Originating Location (pt. Location): [facility]  Distant Location (provider location):  [facility] INFECTIOUS DISEASE [facility]   Platform used for Video Visit: Litzy Zambrano is a 66 year old who is being evaluated via a billable video visit.      How would you like to obtain your AVS? Mail a copy  If the video visit is dropped, the invitation should be resent by: Send to e-mail at: [email or web address]  Will anyone else be joining your video visit? No    Video-Visit Details  Video Time : 20 min   Originating Location (pt. Location): [place]  Distant Location (provider location):  [facility] INFECTIOUS DISEASE [facility]   Platform used for Video Visit: Vanita    INFECTIOUS DISEASES PROGRESS NOTE    Infectious Diseases Clinic     SUBJECTIVE:                                                      Kareem Bai is a 66 year old male is seen via video for the following health issues: post hospital follow-up ( left 3 rd toe infection/ osteomyelitis)   Kareem Bai is a 66 year old male with medical history significant for diet controlled Diabetes mellitus2 HbA1c 5.7( 6/11/21) with peripheral neuropathy, HTN, HLD, arthritis, Right TKA (2018), occasional cigar smoker, sleep apnea, who was admitted on 10/12/21for infected left third toe.  Culture grew MSSA and Strep canis/ He was on Unasyn and Vancomycin in hospital and discharged on Cefazolin, with tentative end date on 11/25/21 (6 weeks duration).      He is doing well. afebrile. left 3rd toe is dry, no drainage, scabbed over. no pain.  no problems with PICC . Tolerates Cefazolin well  . has good appetite. Blood sugar readings 100-109 range [place].     discussed labs     Problem list and histories reviewed & adjusted, as indicated.  Additional history: as documented    PAST MEDICAL HISTORY:  Patient  has a past medical history of Hypertension, Neuropathy, Prediabetes, and Sleep apnea.    He has no past medical history of History of blood transfusion.    PAST SURGICAL HISTORY:  Patient  has a past surgical history that includes Optical tracking system arthroplasty knee (Right, 1/4/2018).    CURRENT MEDICATIONS:  Current Outpatient Medications   Medication Sig Dispense Refill     lisinopril-hydrochlorothiazide (PRINZIDE/ZESTORETIC) 20-25 MG per tablet Take 1 tablet by mouth daily        naproxen (NAPROSYN) 500 MG tablet Take 500 mg by mouth 2 times daily as needed for moderate pain        pravastatin (PRAVACHOL) 10 MG tablet Take 10 mg by mouth daily        Cadexomer Iodine, topical, 0.9% (IODOSORB) 0.9 % GEL gel Apply topically daily (Patient not taking: Reported on 11/3/2021) 10 g 0     ceFAZolin (ANCEF) intermittent infusion 2 g in 100 mL dextrose PRE-MIX Inject 100 mLs (2 g) into the vein every 8 hours Continue for 6 weeks (Nov 24, 2021) (Patient not taking: Reported on 11/17/2021)       gabapentin (NEURONTIN) 300 MG capsule TAKE 1 CAPSULE BY MOUTH THREE TIMES DAILY (Patient not taking: Reported on 11/3/2021)  11     povidone-iodine (BETADINE) 10 % ointment Apply topically daily (Patient not taking: Reported on 11/3/2021) 28 g 0     ALLERGY:  Allergies   Allergen Reactions     Ampicillin Other (See Comments) and Hives     Codeine GI Disturbance     LW Reaction: gi upset     Simvastatin Other (See Comments)     IMMUNIZATION HISTORY:  Immunization History   Administered Date(s) Administered     COVID-19,PF,Pfizer (12+ Yrs) 03/05/2021, 03/29/2021     Labs reviewed in EPIC    OBJECTIVE:                                                    GENERAL: healthy, alert, oriented and no  distress  EYES: Eyes grossly normal to inspection  NECK: supple  RESP: breathing comfortably on room air  SKIN: no suspicious lesions or rashes, left 3rd toe looks healed, no redness/ swelling/ bleeding/drainage seen   NEURO: mentation intact and speech normal  PSYCH: mentation appears normal, affect normal  PICC site : normal, no redness/ swelling/ tenderness       ASSESSMENT AND PLAN:                                                      1. Left 3rd toe draining wound with osteomyelitis on Xray 10/12/21 --> healed   - wound drainage cx - gram stain - 1+ GPC and 3+ WBC, predominant PMN, Cx  positive for 3+ Staph aureus MSSA and 3+ Streptococcus canis   - Blood cx - neg x 2 (10/12/21)   - CRP 43 , WBC 7.7, ESR 16 (10/12/21) --> CRP 5.1( 10/21)--> <2.9 (10/28) --> <2.9 (11/4) --> <2.9 (11/10)     Xray of left foot 10/12/21  IMPRESSION:  There is cortical irregularity and a moderately displaced comminuted fracture of the distal tuft of the 3rd toe with overlying soft tissue swelling. The findings would be strong evidence of osteomyelitis in the appropriate clinical setting, but could also be related to trauma. No other significant findings.     2. Diet and exercise controlled Diabetes mellitus HbA1c 5.9( 10/27/21) with peripheral neuropathy,  3. Hypertension   4. Hyperlipidemia   5. Right TKA in 2018   6. Occasional cigar smoker   7. Right foot with decreased pedal pulses , cool to touch   8. LAURA  9. Listed as allergic to Ampicillin ( hives) . patient says this happened in early childhood and not sure if this is a real allergic reaction/ due to other causes. He says he had tolerated Amoxicillin and other type of PCN well. He tolerated Zosyn without any problems , tolerates Cefazolin well      RECOMMENDATION:  - continue Cefazolin 2 gram IV q8hr  ,  duration 6 weeks from surgical debridement on 10/14/21 . ( will complete treatment on 11/25/21) and can remove PICC after that. [facility] pharmacist- informed   - probiotic daily    - weekly lab: BMP, CRP, CBC        has a f/u with Podiatry next week. Follow-up with me/ID as needed basis     Gerald Kat MD, M.Med.Sc  Division of Infectious Diseases and International Medicine  [facility]

## 2021-11-19 ENCOUNTER — HOME INFUSION (PRE-WILLOW HOME INFUSION) (OUTPATIENT)
Dept: PHARMACY | Facility: CLINIC | Age: 66
End: 2021-11-19
Payer: MEDICARE

## 2021-11-22 ENCOUNTER — HOME INFUSION (PRE-WILLOW HOME INFUSION) (OUTPATIENT)
Dept: PHARMACY | Facility: CLINIC | Age: 66
End: 2021-11-22

## 2021-11-22 ENCOUNTER — LAB REQUISITION (OUTPATIENT)
Dept: LAB | Facility: CLINIC | Age: 66
End: 2021-11-22
Payer: MEDICARE

## 2021-11-22 DIAGNOSIS — E11.621 TYPE 2 DIABETES MELLITUS WITH FOOT ULCER (CODE) (H): ICD-10-CM

## 2021-11-22 LAB
ANION GAP SERPL CALCULATED.3IONS-SCNC: 4 MMOL/L (ref 3–14)
BASOPHILS # BLD AUTO: 0 10E3/UL (ref 0–0.2)
BASOPHILS NFR BLD AUTO: 1 %
BUN SERPL-MCNC: 16 MG/DL (ref 7–30)
CALCIUM SERPL-MCNC: 8.7 MG/DL (ref 8.5–10.1)
CHLORIDE BLD-SCNC: 110 MMOL/L (ref 94–109)
CO2 SERPL-SCNC: 27 MMOL/L (ref 20–32)
CREAT SERPL-MCNC: 0.71 MG/DL (ref 0.66–1.25)
CRP SERPL-MCNC: 4 MG/L (ref 0–8)
EOSINOPHIL # BLD AUTO: 0.2 10E3/UL (ref 0–0.7)
EOSINOPHIL NFR BLD AUTO: 3 %
ERYTHROCYTE [DISTWIDTH] IN BLOOD BY AUTOMATED COUNT: 12.8 % (ref 10–15)
GFR SERPL CREATININE-BSD FRML MDRD: >90 ML/MIN/1.73M2
GLUCOSE BLD-MCNC: 97 MG/DL (ref 70–99)
HCT VFR BLD AUTO: 40 % (ref 40–53)
HGB BLD-MCNC: 13.5 G/DL (ref 13.3–17.7)
IMM GRANULOCYTES # BLD: 0 10E3/UL
IMM GRANULOCYTES NFR BLD: 0 %
LYMPHOCYTES # BLD AUTO: 1.7 10E3/UL (ref 0.8–5.3)
LYMPHOCYTES NFR BLD AUTO: 26 %
MCH RBC QN AUTO: 30.8 PG (ref 26.5–33)
MCHC RBC AUTO-ENTMCNC: 33.8 G/DL (ref 31.5–36.5)
MCV RBC AUTO: 91 FL (ref 78–100)
MONOCYTES # BLD AUTO: 0.5 10E3/UL (ref 0–1.3)
MONOCYTES NFR BLD AUTO: 7 %
NEUTROPHILS # BLD AUTO: 4 10E3/UL (ref 1.6–8.3)
NEUTROPHILS NFR BLD AUTO: 63 %
NRBC # BLD AUTO: 0 10E3/UL
NRBC BLD AUTO-RTO: 0 /100
PLATELET # BLD AUTO: 267 10E3/UL (ref 150–450)
POTASSIUM BLD-SCNC: 4.6 MMOL/L (ref 3.4–5.3)
RBC # BLD AUTO: 4.39 10E6/UL (ref 4.4–5.9)
SODIUM SERPL-SCNC: 141 MMOL/L (ref 133–144)
WBC # BLD AUTO: 6.4 10E3/UL (ref 4–11)

## 2021-11-22 PROCEDURE — 86140 C-REACTIVE PROTEIN: CPT | Performed by: INTERNAL MEDICINE

## 2021-11-22 PROCEDURE — 80048 BASIC METABOLIC PNL TOTAL CA: CPT | Performed by: INTERNAL MEDICINE

## 2021-11-22 PROCEDURE — 85025 COMPLETE CBC W/AUTO DIFF WBC: CPT | Performed by: INTERNAL MEDICINE

## 2021-11-23 ENCOUNTER — HOME INFUSION (PRE-WILLOW HOME INFUSION) (OUTPATIENT)
Dept: PHARMACY | Facility: CLINIC | Age: 66
End: 2021-11-23
Payer: MEDICARE

## 2021-11-23 NOTE — PROGRESS NOTES
This is a recent snapshot of the patient's [facility] medical record.  For current drug dose and complete information and questions, call [phone] or In Basket pool, fv home infusion ([id number])  CSN Number:  [id number]

## 2021-11-24 ENCOUNTER — OFFICE VISIT (OUTPATIENT)
Dept: ORTHOPEDICS | Facility: CLINIC | Age: 66
End: 2021-11-24
Payer: MEDICARE

## 2021-11-24 ENCOUNTER — ANCILLARY PROCEDURE (OUTPATIENT)
Dept: GENERAL RADIOLOGY | Facility: CLINIC | Age: 66
End: 2021-11-24
Attending: PODIATRIST
Payer: MEDICARE

## 2021-11-24 DIAGNOSIS — L97.524 DIABETIC ULCER OF TOE OF LEFT FOOT ASSOCIATED WITH TYPE 2 DIABETES MELLITUS, WITH NECROSIS OF BONE (H): ICD-10-CM

## 2021-11-24 DIAGNOSIS — E11.621 DIABETIC ULCER OF TOE OF LEFT FOOT ASSOCIATED WITH TYPE 2 DIABETES MELLITUS, WITH NECROSIS OF BONE (H): Primary | ICD-10-CM

## 2021-11-24 DIAGNOSIS — E11.49 TYPE II OR UNSPECIFIED TYPE DIABETES MELLITUS WITH NEUROLOGICAL MANIFESTATIONS, NOT STATED AS UNCONTROLLED(250.60) (H): ICD-10-CM

## 2021-11-24 DIAGNOSIS — L97.524 DIABETIC ULCER OF TOE OF LEFT FOOT ASSOCIATED WITH TYPE 2 DIABETES MELLITUS, WITH NECROSIS OF BONE (H): Primary | ICD-10-CM

## 2021-11-24 DIAGNOSIS — E11.621 DIABETIC ULCER OF TOE OF LEFT FOOT ASSOCIATED WITH TYPE 2 DIABETES MELLITUS, WITH NECROSIS OF BONE (H): ICD-10-CM

## 2021-11-24 DIAGNOSIS — L84 TYLOMA: ICD-10-CM

## 2021-11-24 DIAGNOSIS — E11.42 DIABETIC POLYNEUROPATHY ASSOCIATED WITH TYPE 2 DIABETES MELLITUS (H): ICD-10-CM

## 2021-11-24 PROCEDURE — 73660 X-RAY EXAM OF TOE(S): CPT | Mod: LT | Performed by: RADIOLOGY

## 2021-11-24 PROCEDURE — 99215 OFFICE O/P EST HI 40 MIN: CPT | Performed by: PODIATRIST

## 2021-11-24 NOTE — LETTER
11/24/2021         RE: Kareem Bai  [address]  [facility] [address]        Dear Colleague,    Thank you for referring your patient, Kareem Bai, to the [facility] [place]. Please see a copy of my visit note below.    Chief Complaint   Patient presents with     Wound Check     Wound, left foot.             HPI: Kareem is a 66 year old male who presents today for further evaluation of left third toe ulcer.  He relates that he has been keeping area covered with the foams.  He relates that he does have some edema to the left foot.     Review of Systems: No n/v/d/f/c/ns/sob/cp    PMH:   Past Medical History:   Diagnosis Date     Hypertension      Neuropathy      Prediabetes      Sleep apnea        PSxH:   Past Surgical History:   Procedure Laterality Date     OPTICAL TRACKING SYSTEM ARTHROPLASTY KNEE Right 1/4/2018    Procedure: OPTICAL TRACKING SYSTEM ARTHROPLASTY KNEE;  Right Total Knee Arthroplasty;  Surgeon: Marcello Beatty MD;  Location: [facility] OR       Allergies: Ampicillin, Codeine, and Simvastatin    SH:   Social History     Socioeconomic History     Marital status:      Spouse name: Not on file     Number of children: Not on file     Years of education: Not on file     Highest education level: Not on file   Occupational History     Not on file   Tobacco Use     Smoking status: Current Some Day Smoker     Types: Cigars     Smokeless tobacco: Never Used     Tobacco comment: 5-6/week   Substance and Sexual Activity     Alcohol use: Yes     Comment: Drink every 2-3 days     Drug use: No     Sexual activity: Not on file   Other Topics Concern     Not on file   Social History Narrative     Not on file     Social Determinants of Health     Financial Resource Strain: Not on file   Food Insecurity: Not on file   Transportation Needs: Not on file   Physical Activity: Not on file   Stress: Not on file   Social Connections: Not on file   Intimate Partner  Violence: Not on file   Housing Stability: Not on file       FH: No family history on file.    Objective:    PT and DP pulses are 2/4 bilaterally. CRT is instant. Positive pedal hair. +1 pitting edema to left dorsal foot.   Gross sensation is diminished bilaterally.  Protective sensation is diminished as demonstrated with a 5.07G monofilament.  Equinus intact bilaterally. No pain with active or passive ROM of the ankle, MTJ, 1st ray, or halluces bilaterally,.       A diabetic wound is noted at left  Distal third digit has healed over.  There is a tyloma to the area.    Hyperkeratotic lesion is noted to the bilateral distal halluces.  These are at the tips.  These were sharply debrided today, no open lesions were noted underneath there.  No signs or symptoms of infection noted to the left foot.      left 3rd toe xrays indicated in 3 weightbearing views.    No new osteolysis of the distal phalanx of the third digit.  There does appear to be some new cortex forming.      Assessment: Juan Manuel is a 66-year-old type II diabetic with neuropathy and osteomyelitis of the left third digit.  He is on 6 weeks of antibiotics and he is currently on week 4.  Wound is healed over.  The bone does look better on x-ray.  There are more areas of cortex on the lateral.  There is significant increase in bone density since the last set of x-rays.      Plan:  - Pt seen and evaluated.  -X-rays taken and discussed with him.  -Tyloma was debrided as described on bilateral halluces.  -He can keep the toes covered with silicone toe sleeves.  -He go back to his normal shoes.  -He is set to be done with antibiotics on Friday.  There is already an order for the PICC line to be removed.  I do agree with this plan.  -See again in 4 weeks, or sooner if problems arise.  I spent 40 minutes on the date of service with patient care, documentation, chart review, image review, and care coordination.  This was separate from the tyloma training today.          Carmine Santacruz, SRINIVASANM

## 2021-11-24 NOTE — NURSING NOTE
Reason For Visit:   Chief Complaint   Patient presents with     Wound Check     Wound, left foot.        Pain Assessment  Patient Currently in Pain: Denies        Allergies   Allergen Reactions     Ampicillin Other (See Comments) and Hives     Codeine GI Disturbance     LW Reaction: gi upset     Simvastatin Other (See Comments)           Mora Field LPN

## 2021-11-24 NOTE — PROGRESS NOTES
Chief Complaint   Patient presents with     Wound Check     Wound, left foot.             HPI: Kareem is a 66 year old male who presents today for further evaluation of left third toe ulcer.  He relates that he has been keeping area covered with the foams.  He relates that he does have some edema to the left foot.     Review of Systems: No n/v/d/f/c/ns/sob/cp    PMH:   Past Medical History:   Diagnosis Date     Hypertension      Neuropathy      Prediabetes      Sleep apnea        PSxH:   Past Surgical History:   Procedure Laterality Date     OPTICAL TRACKING SYSTEM ARTHROPLASTY KNEE Right 1/4/2018    Procedure: OPTICAL TRACKING SYSTEM ARTHROPLASTY KNEE;  Right Total Knee Arthroplasty;  Surgeon: Marcello Beatty MD;  Location: [facility]       Allergies: Ampicillin, Codeine, and Simvastatin    SH:   Social History     Socioeconomic History     Marital status:      Spouse name: Not on file     Number of children: Not on file     Years of education: Not on file     Highest education level: Not on file   Occupational History     Not on file   Tobacco Use     Smoking status: Current Some Day Smoker     Types: Cigars     Smokeless tobacco: Never Used     Tobacco comment: 5-6/week   Substance and Sexual Activity     Alcohol use: Yes     Comment: Drink every 2-3 days     Drug use: No     Sexual activity: Not on file   Other Topics Concern     Not on file   Social History Narrative     Not on file     Social Determinants of Health     Financial Resource Strain: Not on file   Food Insecurity: Not on file   Transportation Needs: Not on file   Physical Activity: Not on file   Stress: Not on file   Social Connections: Not on file   Intimate Partner Violence: Not on file   Housing Stability: Not on file       FH: No family history on file.    Objective:    PT and DP pulses are 2/4 bilaterally. CRT is instant. Positive pedal hair. +1 pitting edema to left dorsal foot.   Gross sensation is diminished bilaterally.  Protective  sensation is diminished as demonstrated with a 5.07G monofilament.  Equinus intact bilaterally. No pain with active or passive ROM of the ankle, MTJ, 1st ray, or halluces bilaterally,.       A diabetic wound is noted at left  Distal third digit has healed over.  There is a tyloma to the area.    Hyperkeratotic lesion is noted to the bilateral distal halluces.  These are at the tips.  These were sharply debrided today, no open lesions were noted underneath there.  No signs or symptoms of infection noted to the left foot.      left 3rd toe xrays indicated in 3 weightbearing views.    No new osteolysis of the distal phalanx of the third digit.  There does appear to be some new cortex forming.      Assessment: Juan Manuel is a 66-year-old type II diabetic with neuropathy and osteomyelitis of the left third digit.  He is on 6 weeks of antibiotics and he is currently on week 4.  Wound is healed over.  The bone does look better on x-ray.  There are more areas of cortex on the lateral.  There is significant increase in bone density since the last set of x-rays.      Plan:  - Pt seen and evaluated.  -X-rays taken and discussed with him.  -Tyloma was debrided as described on bilateral halluces.  -He can keep the toes covered with silicone toe sleeves.  -He go back to his normal shoes.  -He is set to be done with antibiotics on Friday.  There is already an order for the PICC line to be removed.  I do agree with this plan.  -See again in 4 weeks, or sooner if problems arise.  I spent 40 minutes on the date of service with patient care, documentation, chart review, image review, and care coordination.  This was separate from the tyloma training today.

## 2021-11-26 ENCOUNTER — HOME INFUSION (PRE-WILLOW HOME INFUSION) (OUTPATIENT)
Dept: PHARMACY | Facility: CLINIC | Age: 66
End: 2021-11-26
Payer: MEDICARE

## 2021-11-26 NOTE — PROGRESS NOTES
This is a recent snapshot of the patient's [facility] medical record.  For current drug dose and complete information and questions, call [phone] or In Basket pool, fv home infusion ([id number])  CSN Number:  [id number]

## 2021-11-29 NOTE — PROGRESS NOTES
This is a recent snapshot of the patient's [facility] medical record.  For current drug dose and complete information and questions, call [phone] or In Basket pool, fv home infusion ([id number])  CSN Number:  [id number]

## 2021-12-04 ENCOUNTER — HEALTH MAINTENANCE LETTER (OUTPATIENT)
Age: 66
End: 2021-12-04

## 2021-12-22 ENCOUNTER — OFFICE VISIT (OUTPATIENT)
Dept: ORTHOPEDICS | Facility: CLINIC | Age: 66
End: 2021-12-22
Payer: MEDICARE

## 2021-12-22 DIAGNOSIS — E11.49 TYPE II OR UNSPECIFIED TYPE DIABETES MELLITUS WITH NEUROLOGICAL MANIFESTATIONS, NOT STATED AS UNCONTROLLED(250.60) (H): Primary | ICD-10-CM

## 2021-12-22 DIAGNOSIS — L84 TYLOMA: ICD-10-CM

## 2021-12-22 PROCEDURE — 99213 OFFICE O/P EST LOW 20 MIN: CPT | Performed by: PODIATRIST

## 2021-12-22 NOTE — PROGRESS NOTES
Chief Complaint   Patient presents with     Follow Up     Wound, left foot.             HPI: Kareem is a 66 year old male who presents today for further evaluation of left third toe ulcer.  He relates that he gets calluses on the toes.  He does have debrided these with sharp instruments.  He relates that the third toe on the left side has healed.    Review of Systems: No n/v/d/f/c/ns/sob/cp    PMH:   Past Medical History:   Diagnosis Date     Hypertension      Neuropathy      Prediabetes      Sleep apnea        PSxH:   Past Surgical History:   Procedure Laterality Date     OPTICAL TRACKING SYSTEM ARTHROPLASTY KNEE Right 1/4/2018    Procedure: OPTICAL TRACKING SYSTEM ARTHROPLASTY KNEE;  Right Total Knee Arthroplasty;  Surgeon: Marcello Beatty MD;  Location: [facility]       Allergies: Ampicillin, Codeine, and Simvastatin    SH:   Social History     Socioeconomic History     Marital status:      Spouse name: Not on file     Number of children: Not on file     Years of education: Not on file     Highest education level: Not on file   Occupational History     Not on file   Tobacco Use     Smoking status: Current Some Day Smoker     Types: Cigars     Smokeless tobacco: Never Used     Tobacco comment: 5-6/week   Substance and Sexual Activity     Alcohol use: Yes     Comment: Drink every 2-3 days     Drug use: No     Sexual activity: Not on file   Other Topics Concern     Not on file   Social History Narrative     Not on file     Social Determinants of Health     Financial Resource Strain: Not on file   Food Insecurity: Not on file   Transportation Needs: Not on file   Physical Activity: Not on file   Stress: Not on file   Social Connections: Not on file   Intimate Partner Violence: Not on file   Housing Stability: Not on file       FH: No family history on file.    Objective:    PT and DP pulses are 2/4 bilaterally. CRT is instant. Positive pedal hair. +1 pitting edema to left dorsal foot.   Gross sensation is  diminished bilaterally.  Protective sensation is diminished as demonstrated with a 5.07G monofilament.  Equinus intact bilaterally. No pain with active or passive ROM of the ankle, MTJ, 1st ray, or halluces bilaterally,.       A diabetic wound is noted at left  Distal third digit has healed over.  There is a tyloma to the area.    Hyperkeratotic lesion is noted to the bilateral distal halluces.  These are at the tips.  These were sharply debrided today, no open lesions were noted underneath there.  On the dorsal aspects of the bilateral halluces, he does have some open  cuts where he has self debrided his calluses.      left 3rd toe xrays indicated in 3 weightbearing views.    No new osteolysis of the distal phalanx of the third digit.  There does appear to be some new cortex forming.      Assessment: Juan Manuel is a 66-year-old type II diabetic with neuropathy and osteomyelitis of the left third digit.  He is on 6 weeks of antibiotics and he is currently on week 4.  Wound is healed over.  The bone does look better on x-ray.  There are more areas of cortex on the lateral.  There is significant increase in bone density since the last set of x-rays.      Plan:  - Pt seen and evaluated.  -X-rays taken and discussed with him.  -Tyloma was debrided as described on bilateral halluces.  - Recommend that he use a pumice stone on the calluses, instead of sharps.   -He can keep the toes covered with silicone toe sleeves.  -See again in 9 weeks, or sooner if problems arise.

## 2021-12-22 NOTE — LETTER
12/22/2021         RE: Kareem Bai  [address]  [facility] [address]        Dear Colleague,    Thank you for referring your patient, Kareem Bai, to the [facility] [place]. Please see a copy of my visit note below.    Chief Complaint   Patient presents with     Follow Up     Wound, left foot.             HPI: Kareem is a 66 year old male who presents today for further evaluation of left third toe ulcer.  He relates that he gets calluses on the toes.  He does have debrided these with sharp instruments.  He relates that the third toe on the left side has healed.    Review of Systems: No n/v/d/f/c/ns/sob/cp    PMH:   Past Medical History:   Diagnosis Date     Hypertension      Neuropathy      Prediabetes      Sleep apnea        PSxH:   Past Surgical History:   Procedure Laterality Date     OPTICAL TRACKING SYSTEM ARTHROPLASTY KNEE Right 1/4/2018    Procedure: OPTICAL TRACKING SYSTEM ARTHROPLASTY KNEE;  Right Total Knee Arthroplasty;  Surgeon: Marcello Beatty MD;  Location: [facility]       Allergies: Ampicillin, Codeine, and Simvastatin    SH:   Social History     Socioeconomic History     Marital status:      Spouse name: Not on file     Number of children: Not on file     Years of education: Not on file     Highest education level: Not on file   Occupational History     Not on file   Tobacco Use     Smoking status: Current Some Day Smoker     Types: Cigars     Smokeless tobacco: Never Used     Tobacco comment: 5-6/week   Substance and Sexual Activity     Alcohol use: Yes     Comment: Drink every 2-3 days     Drug use: No     Sexual activity: Not on file   Other Topics Concern     Not on file   Social History Narrative     Not on file     Social Determinants of Health     Financial Resource Strain: Not on file   Food Insecurity: Not on file   Transportation Needs: Not on file   Physical Activity: Not on file   Stress: Not on file   Social Connections:  Not on file   Intimate Partner Violence: Not on file   Housing Stability: Not on file       FH: No family history on file.    Objective:    PT and DP pulses are 2/4 bilaterally. CRT is instant. Positive pedal hair. +1 pitting edema to left dorsal foot.   Gross sensation is diminished bilaterally.  Protective sensation is diminished as demonstrated with a 5.07G monofilament.  Equinus intact bilaterally. No pain with active or passive ROM of the ankle, MTJ, 1st ray, or halluces bilaterally,.       A diabetic wound is noted at left  Distal third digit has healed over.  There is a tyloma to the area.    Hyperkeratotic lesion is noted to the bilateral distal halluces.  These are at the tips.  These were sharply debrided today, no open lesions were noted underneath there.  On the dorsal aspects of the bilateral halluces, he does have some open  cuts where he has self debrided his calluses.      left 3rd toe xrays indicated in 3 weightbearing views.    No new osteolysis of the distal phalanx of the third digit.  There does appear to be some new cortex forming.      Assessment: Juan Manuel is a 66-year-old type II diabetic with neuropathy and osteomyelitis of the left third digit.  He is on 6 weeks of antibiotics and he is currently on week 4.  Wound is healed over.  The bone does look better on x-ray.  There are more areas of cortex on the lateral.  There is significant increase in bone density since the last set of x-rays.      Plan:  - Pt seen and evaluated.  -X-rays taken and discussed with him.  -Tyloma was debrided as described on bilateral halluces.  - Recommend that he use a pumice stone on the calluses, instead of sharps.   -He can keep the toes covered with silicone toe sleeves.  -See again in 9 weeks, or sooner if problems arise.    Carmine Santacruz, SAMMIE

## 2022-01-29 ENCOUNTER — HEALTH MAINTENANCE LETTER (OUTPATIENT)
Age: 67
End: 2022-01-29

## 2022-02-07 NOTE — PROGRESS NOTES
This is a recent snapshot of the patient's [facility] medical record.  For current drug dose and complete information and questions, call [phone] or In Basket pool, fv home infusion ([id number])  CSN Number:  [id number]

## 2022-02-08 NOTE — PROGRESS NOTES
This is a recent snapshot of the patient's [facility] medical record.  For current drug dose and complete information and questions, call [phone] or In Basket pool, fv home infusion ([id number])  CSN Number:  [id number]

## 2022-02-16 NOTE — PROGRESS NOTES
This is a recent snapshot of the patient's [facility] medical record.  For current drug dose and complete information and questions, call [phone] or In Basket pool, fv home infusion ([id number])  CSN Number:  [id number]

## 2022-02-23 ENCOUNTER — OFFICE VISIT (OUTPATIENT)
Dept: ORTHOPEDICS | Facility: CLINIC | Age: 67
End: 2022-02-23
Payer: MEDICARE

## 2022-02-23 DIAGNOSIS — E11.49 TYPE II OR UNSPECIFIED TYPE DIABETES MELLITUS WITH NEUROLOGICAL MANIFESTATIONS, NOT STATED AS UNCONTROLLED(250.60) (H): Primary | ICD-10-CM

## 2022-02-23 DIAGNOSIS — L84 TYLOMA: ICD-10-CM

## 2022-02-23 PROCEDURE — 99214 OFFICE O/P EST MOD 30 MIN: CPT | Performed by: PODIATRIST

## 2022-02-23 NOTE — PROGRESS NOTES
Chief Complaint   Patient presents with     Follow Up     hallux tylomas         HPI: Kareem is a 66 year old male who presents today for further evaluation of left third toe ulcer.  He relates that he gets calluses on the toes.  He does debride these with sharp instruments.  He relates that the third toe on the left side has healed.    Review of Systems: No n/v/d/f/c/ns/sob/cp    PMH:   Past Medical History:   Diagnosis Date     Hypertension      Neuropathy      Prediabetes      Sleep apnea        PSxH:   Past Surgical History:   Procedure Laterality Date     OPTICAL TRACKING SYSTEM ARTHROPLASTY KNEE Right 1/4/2018    Procedure: OPTICAL TRACKING SYSTEM ARTHROPLASTY KNEE;  Right Total Knee Arthroplasty;  Surgeon: Marcello Beatty MD;  Location: [facility]       Allergies: Ampicillin, Codeine, and Simvastatin    SH:   Social History     Socioeconomic History     Marital status:      Spouse name: Not on file     Number of children: Not on file     Years of education: Not on file     Highest education level: Not on file   Occupational History     Not on file   Tobacco Use     Smoking status: Current Some Day Smoker     Types: Cigars     Smokeless tobacco: Never Used     Tobacco comment: 5-6/week   Substance and Sexual Activity     Alcohol use: Yes     Comment: Drink every 2-3 days     Drug use: No     Sexual activity: Not on file   Other Topics Concern     Not on file   Social History Narrative     Not on file     Social Determinants of Health     Financial Resource Strain: Not on file   Food Insecurity: Not on file   Transportation Needs: Not on file   Physical Activity: Not on file   Stress: Not on file   Social Connections: Not on file   Intimate Partner Violence: Not on file   Housing Stability: Not on file       FH: No family history on file.    Objective:    PT and DP pulses are 2/4 bilaterally. CRT is instant. Positive pedal hair. +1 pitting edema to left dorsal foot.   Gross sensation is diminished  bilaterally.  Protective sensation is diminished as demonstrated with a 5.07G monofilament.  Equinus intact bilaterally. No pain with active or passive ROM of the ankle, MTJ, 1st ray, or halluces bilaterally,.   A diabetic wound is noted at left  Distal third digit has healed over.    Hyperkeratotic lesion is noted to the bilateral distal halluces.  These are at the tips.  These were sharply debrided today, no open lesions were noted underneath there.  On the dorsal aspects of the bilateral halluces, he does have some open  cuts where he has self debrided his calluses.        Assessment: Juan Manuel is a 66-year-old type II diabetic with neuropathy and osteomyelitis of the left third digit.  Wounds of healed over today.  He does have hyperkeratotic lesions with intrasubstance bleeding noted.        Plan:  - Pt seen and evaluated.  -Tyloma was debrided as described on bilateral halluces.  - Recommend that he use a pumice stone on the calluses, instead of sharps.   -He can keep the toes covered with silicone toe sleeves.  -See again in 9 weeks, or sooner if problems arise.

## 2022-02-23 NOTE — LETTER
2/23/2022         RE: Kareem Bai  [address]  [facility] [address]        Dear Colleague,    Thank you for referring your patient, Kareem Bai, to the [facility] [place]. Please see a copy of my visit note below.    Chief Complaint   Patient presents with     Follow Up     hallux tylomas         HPI: Kareem is a 66 year old male who presents today for further evaluation of left third toe ulcer.  He relates that he gets calluses on the toes.  He does debride these with sharp instruments.  He relates that the third toe on the left side has healed.    Review of Systems: No n/v/d/f/c/ns/sob/cp    PMH:   Past Medical History:   Diagnosis Date     Hypertension      Neuropathy      Prediabetes      Sleep apnea        PSxH:   Past Surgical History:   Procedure Laterality Date     OPTICAL TRACKING SYSTEM ARTHROPLASTY KNEE Right 1/4/2018    Procedure: OPTICAL TRACKING SYSTEM ARTHROPLASTY KNEE;  Right Total Knee Arthroplasty;  Surgeon: Marcello Beatty MD;  Location: [facility]       Allergies: Ampicillin, Codeine, and Simvastatin    SH:   Social History     Socioeconomic History     Marital status:      Spouse name: Not on file     Number of children: Not on file     Years of education: Not on file     Highest education level: Not on file   Occupational History     Not on file   Tobacco Use     Smoking status: Current Some Day Smoker     Types: Cigars     Smokeless tobacco: Never Used     Tobacco comment: 5-6/week   Substance and Sexual Activity     Alcohol use: Yes     Comment: Drink every 2-3 days     Drug use: No     Sexual activity: Not on file   Other Topics Concern     Not on file   Social History Narrative     Not on file     Social Determinants of Health     Financial Resource Strain: Not on file   Food Insecurity: Not on file   Transportation Needs: Not on file   Physical Activity: Not on file   Stress: Not on file   Social Connections: Not on file    Intimate Partner Violence: Not on file   Housing Stability: Not on file       FH: No family history on file.    Objective:    PT and DP pulses are 2/4 bilaterally. CRT is instant. Positive pedal hair. +1 pitting edema to left dorsal foot.   Gross sensation is diminished bilaterally.  Protective sensation is diminished as demonstrated with a 5.07G monofilament.  Equinus intact bilaterally. No pain with active or passive ROM of the ankle, MTJ, 1st ray, or halluces bilaterally,.   A diabetic wound is noted at left  Distal third digit has healed over.    Hyperkeratotic lesion is noted to the bilateral distal halluces.  These are at the tips.  These were sharply debrided today, no open lesions were noted underneath there.  On the dorsal aspects of the bilateral halluces, he does have some open  cuts where he has self debrided his calluses.        Assessment: Juan Manuel is a 66-year-old type II diabetic with neuropathy and osteomyelitis of the left third digit.  Wounds of healed over today.  He does have hyperkeratotic lesions with intrasubstance bleeding noted.        Plan:  - Pt seen and evaluated.  -Tyloma was debrided as described on bilateral halluces.  - Recommend that he use a pumice stone on the calluses, instead of sharps.   -He can keep the toes covered with silicone toe sleeves.  -See again in 9 weeks, or sooner if problems arise.       Carmine Santacruz, SAMMIE

## 2022-03-02 NOTE — PROGRESS NOTES
This is a recent snapshot of the patient's [facility] medical record.  For current drug dose and complete information and questions, call [phone] or In Basket pool, fv home infusion ([id number])  CSN Number:  [id number]

## 2022-03-24 NOTE — PROGRESS NOTES
This is a recent snapshot of the patient's [facility] medical record.  For current drug dose and complete information and questions, call [phone] or In Basket pool, fv home infusion ([id number])  CSN Number:  [id number]

## 2022-03-30 NOTE — PROGRESS NOTES
This is a recent snapshot of the patient's [facility] medical record.  For current drug dose and complete information and questions, call [phone] or In Basket pool, fv home infusion ([id number])  CSN Number:  [id number]

## 2022-04-25 ENCOUNTER — OFFICE VISIT (OUTPATIENT)
Dept: ORTHOPEDICS | Facility: CLINIC | Age: 67
End: 2022-04-25
Payer: MEDICARE

## 2022-04-25 DIAGNOSIS — Z87.2 HISTORY OF FOOT ULCER: ICD-10-CM

## 2022-04-25 DIAGNOSIS — E11.49 TYPE II OR UNSPECIFIED TYPE DIABETES MELLITUS WITH NEUROLOGICAL MANIFESTATIONS, NOT STATED AS UNCONTROLLED(250.60) (H): Primary | ICD-10-CM

## 2022-04-25 DIAGNOSIS — B35.1 OM (ONYCHOMYCOSIS): ICD-10-CM

## 2022-04-25 DIAGNOSIS — L84 TYLOMA: ICD-10-CM

## 2022-04-25 PROCEDURE — 99214 OFFICE O/P EST MOD 30 MIN: CPT | Performed by: PODIATRIST

## 2022-04-25 RX ORDER — CICLOPIROX OLAMINE 7.7 MG/G
CREAM TOPICAL 2 TIMES DAILY
Qty: 90 G | Refills: 4 | Status: SHIPPED | OUTPATIENT
Start: 2022-04-25

## 2022-04-25 NOTE — PROGRESS NOTES
Past Medical History:   Diagnosis Date     Hypertension      Neuropathy      Prediabetes      Sleep apnea      Patient Active Problem List   Diagnosis     Hx of total knee arthroplasty     Diabetes mellitus, type 2 (H)     Diabetic ulcer of toe of left foot associated with type 2 diabetes mellitus, with fat layer exposed (H)     Bradycardia     Past Surgical History:   Procedure Laterality Date     OPTICAL TRACKING SYSTEM ARTHROPLASTY KNEE Right 1/4/2018    Procedure: OPTICAL TRACKING SYSTEM ARTHROPLASTY KNEE;  Right Total Knee Arthroplasty;  Surgeon: Marcello Beatty MD;  Location:  OR     Social History     Socioeconomic History     Marital status:      Spouse name: Not on file     Number of children: Not on file     Years of education: Not on file     Highest education level: Not on file   Occupational History     Not on file   Tobacco Use     Smoking status: Current Some Day Smoker     Types: Cigars     Smokeless tobacco: Never Used     Tobacco comment: 5-6/week   Substance and Sexual Activity     Alcohol use: Yes     Comment: Drink every 2-3 days     Drug use: No     Sexual activity: Not on file   Other Topics Concern     Not on file   Social History Narrative     Not on file     Social Determinants of Health     Financial Resource Strain: Not on file   Food Insecurity: Not on file   Transportation Needs: Not on file   Physical Activity: Not on file   Stress: Not on file   Social Connections: Not on file   Intimate Partner Violence: Not on file   Housing Stability: Not on file     No family history on file.    Lab Results   Component Value Date    A1C 5.9 10/27/2021    A1C 7.2 10/18/2019     Inr      0.9      4/20/22  Previous xrays reviewed.  SUBJECTIVE FINDINGS:  A 67-year-old male with peripheral neuropathy and diet-controlled diabetes returns to clinic for foot cares.  Relates that his left 3rd toe is doing well.  That has remained healed.  He was on IV antibiotics and went through that course.   Relates his big toes have calluses on them.  He files them with a pumice stone.  He is using [employer] moisturizer on his feet.  Relates no new injuries.  He is using the insoles in his shoes.  He is wearing [employer] tennis shoes.  Relates that he is not sure if he has diabetes.  He thought he was prediabetic but it is noted in the chart that he has diet-controlled diabetes.    OBJECTIVE FINDINGS:  DP and PT are 2/4 bilaterally.  He has dorsally contracted digits, 1 through 5 bilaterally.  He has hyperkeratotic tissue buildup with ecchymosis, distal halluxes bilaterally.  There is dystrophic, thickened nails with subungual debris, dystrophy and discoloration bilaterally to differing degrees.  There is no erythema, no drainage, no odor, no calor.    ASSESSMENT AND PLAN:  Tylomas, bilateral halluxes, with pre-ulcerative changes.  He is diabetic with peripheral neuropathy.   He has a history of osteomyelitis of left 3rd toe.  That remains healed and is doing well.  Diagnosis and treatment options discussed with him.  The tylomas of the hallux nails were debrided bilaterally upon consent with a tissue cutter.  Prescription for Loprox cream given and use discussed with him.  Prescription for diabetic shoes and insoles given and use discussed with him.  He relates the insoles he has now are about 4 years old and it looks like his toes are hitting the end of his shoes.  Diagnosis and treatment discussed with him.  Previous notes reviewed as well as hospital notes and Dr. Santacruz's notes.  Return to clinic and see me in 2 months.        Moderate level of medical decision making.

## 2022-04-25 NOTE — LETTER
4/25/2022    RE: Kareem Bai  [address]  [facility] [address]    Dear Colleague,    Thank you for referring your patient, Kareem Bai, to the [facility] [place]. Please see a copy of my visit note below.    Past Medical History:   Diagnosis Date     Hypertension      Neuropathy      Prediabetes      Sleep apnea      Patient Active Problem List   Diagnosis     Hx of total knee arthroplasty     Diabetes mellitus, type 2 (H)     Diabetic ulcer of toe of left foot associated with type 2 diabetes mellitus, with fat layer exposed (H)     Bradycardia     Past Surgical History:   Procedure Laterality Date     OPTICAL TRACKING SYSTEM ARTHROPLASTY KNEE Right 1/4/2018    Procedure: OPTICAL TRACKING SYSTEM ARTHROPLASTY KNEE;  Right Total Knee Arthroplasty;  Surgeon: Marcello Beatty MD;  Location: [facility] OR     Social History     Socioeconomic History     Marital status:      Spouse name: Not on file     Number of children: Not on file     Years of education: Not on file     Highest education level: Not on file   Occupational History     Not on file   Tobacco Use     Smoking status: Current Some Day Smoker     Types: Cigars     Smokeless tobacco: Never Used     Tobacco comment: 5-6/week   Substance and Sexual Activity     Alcohol use: Yes     Comment: Drink every 2-3 days     Drug use: No     Sexual activity: Not on file   Other Topics Concern     Not on file   Social History Narrative     Not on file     Social Determinants of Health     Financial Resource Strain: Not on file   Food Insecurity: Not on file   Transportation Needs: Not on file   Physical Activity: Not on file   Stress: Not on file   Social Connections: Not on file   Intimate Partner Violence: Not on file   Housing Stability: Not on file     No family history on file.    Lab Results   Component Value Date    A1C 5.9 10/27/2021    A1C 7.2 10/18/2019     Inr      0.9      4/20/22  Previous xrays  reviewed.  SUBJECTIVE FINDINGS:  A 67-year-old male with peripheral neuropathy and diet-controlled diabetes returns to clinic for foot cares.  Relates that his left 3rd toe is doing well.  That has remained healed.  He was on IV antibiotics and went through that course.  Relates his big toes have calluses on them.  He files them with a pumice stone.  He is using Gold Bond moisturizer on his feet.  Relates no new injuries.  He is using the insoles in his shoes.  He is wearing [employer] Balance tennis shoes.  Relates that he is not sure if he has diabetes.  He thought he was prediabetic but it is noted in the chart that he has diet-controlled diabetes.    OBJECTIVE FINDINGS:  DP and PT are 2/4 bilaterally.  He has dorsally contracted digits, 1 through 5 bilaterally.  He has hyperkeratotic tissue buildup with ecchymosis, distal halluxes bilaterally.  There is dystrophic, thickened nails with subungual debris, dystrophy and discoloration bilaterally to differing degrees.  There is no erythema, no drainage, no odor, no calor.    ASSESSMENT AND PLAN:  Tylomas, bilateral halluxes, with pre-ulcerative changes.  He is diabetic with peripheral neuropathy.   He has a history of osteomyelitis of left 3rd toe.  That remains healed and is doing well.  Diagnosis and treatment options discussed with him.  The tylomas of the hallux nails were debrided bilaterally upon consent with a tissue cutter.  Prescription for Loprox cream given and use discussed with him.  Prescription for diabetic shoes and insoles given and use discussed with him.  He relates the insoles he has now are about 4 years old and it looks like his toes are hitting the end of his shoes.  Diagnosis and treatment discussed with him.  Previous notes reviewed as well as hospital notes and Dr. Santacruz's notes.  Return to clinic and see me in 2 months.    Moderate level of medical decision making.    Again, thank you for allowing me to participate in the care of your patient.       Sincerely,    Alvarado Lua DPM

## 2022-04-25 NOTE — NURSING NOTE
Reason For Visit:   Chief Complaint   Patient presents with     Follow Up     9 week follow up.                 Allergies   Allergen Reactions     Ampicillin Other (See Comments) and Hives     Codeine GI Disturbance     LW Reaction: gi upset     Simvastatin Other (See Comments)           Mora Field LPN

## 2022-05-21 ENCOUNTER — HEALTH MAINTENANCE LETTER (OUTPATIENT)
Age: 67
End: 2022-05-21

## 2022-06-27 ENCOUNTER — OFFICE VISIT (OUTPATIENT)
Dept: ORTHOPEDICS | Facility: CLINIC | Age: 67
End: 2022-06-27
Payer: MEDICARE

## 2022-06-27 DIAGNOSIS — E11.621 DIABETIC ULCER OF TOE OF LEFT FOOT ASSOCIATED WITH TYPE 2 DIABETES MELLITUS, WITH FAT LAYER EXPOSED (H): ICD-10-CM

## 2022-06-27 DIAGNOSIS — E11.49 TYPE II OR UNSPECIFIED TYPE DIABETES MELLITUS WITH NEUROLOGICAL MANIFESTATIONS, NOT STATED AS UNCONTROLLED(250.60) (H): Primary | ICD-10-CM

## 2022-06-27 DIAGNOSIS — L97.522 DIABETIC ULCER OF TOE OF LEFT FOOT ASSOCIATED WITH TYPE 2 DIABETES MELLITUS, WITH FAT LAYER EXPOSED (H): ICD-10-CM

## 2022-06-27 PROCEDURE — 99214 OFFICE O/P EST MOD 30 MIN: CPT | Performed by: PODIATRIST

## 2022-06-27 RX ORDER — CLOPIDOGREL BISULFATE 75 MG/1
75 TABLET ORAL
COMMUNITY
Start: 2022-04-20 | End: 2023-04-20

## 2022-06-27 NOTE — LETTER
6/27/2022         RE: Kareem Bai  [address]  [facility] [address]        Dear Colleague,    Thank you for referring your patient, Kareem Bai, to the [facility] [place]. Please see a copy of my visit note below.    Chief Complaint   Patient presents with     Follow Up     9 week follow up.         HPI: Kareem is a 67 year old male who presents today for further evaluation of toe ulcerations. He relates that he has a wound on the top of the left hallux, that has been present for a couple of weeks. It started after rubbing in a new shoe. He has not been covering the area.      Review of Systems: No n/v/d/f/c/ns/sob/cp    PMH:   Past Medical History:   Diagnosis Date     Hypertension      Neuropathy      Prediabetes      Sleep apnea        PSxH:   Past Surgical History:   Procedure Laterality Date     OPTICAL TRACKING SYSTEM ARTHROPLASTY KNEE Right 1/4/2018    Procedure: OPTICAL TRACKING SYSTEM ARTHROPLASTY KNEE;  Right Total Knee Arthroplasty;  Surgeon: Marcello Beatty MD;  [facility] OR       Allergies: Ampicillin, Codeine, and Simvastatin    SH:   Social History     Socioeconomic History     Marital status:      Spouse name: Not on file     Number of children: Not on file     Years of education: Not on file     Highest education level: Not on file   Occupational History     Not on file   Tobacco Use     Smoking status: Current Some Day Smoker     Types: Cigars     Smokeless tobacco: Never Used     Tobacco comment: 5-6/week   Substance and Sexual Activity     Alcohol use: Yes     Comment: Drink every 2-3 days     Drug use: No     Sexual activity: Not on file   Other Topics Concern     Not on file   Social History Narrative     Not on file     Social Determinants of Health     Financial Resource Strain: Not on file   Food Insecurity: Not on file   Transportation Needs: Not on file   Physical Activity: Not on file   Stress: Not on file   Social  Connections: Not on file   Intimate Partner Violence: Not on file   Housing Stability: Not on file       FH: No family history on file.    Objective:    PT and DP pulses are 2/4 bilaterally. CRT is instant. Positive pedal hair. +1 pitting edema to left dorsal foot.   Gross sensation is diminished bilaterally.  Protective sensation is diminished as demonstrated with a 5.07G monofilament.  Equinus intact bilaterally. No pain with active or passive ROM of the ankle, MTJ, 1st ray, or halluces bilaterally,.   A diabetic wound is noted at left  Distal third digit has healed over.    Hyperkeratotic lesion is noted to the bilateral distal halluces.  These are at the tips.  These were sharply debrided today, no open lesions were noted underneath there.  On the dorsal aspects of the bilateral halluces, he does have some open  cuts where he has self debrided his calluses.      A pressure wound is noted at left  dorsal hallux at the IPJ measuring 0.2cm x 0.2cm x 0.1cm.    Paulino Classification: 2    Wound base: Red/Granulation    Edges: intact    Drainage: scant/serous    Odor: no    Undermining: no    Bone Exposure: No    Clinical Signs of Infection: No    After obtaining patient consent, the wound was irrigated with copious amounts of salin    Barriers to Healing: Wound is in an area of pressure. Previous delayed wound healing.     Treatment Plan: Iodosorb and Primapore.     Pt Ability to Follow Plan: Good.           Assessment: Juan Manuel is a 66-year-old type II diabetic with neuropathy and osteomyelitis of the left third digit.    He does have hyperkeratotic lesions with intrasubstance bleeding noted.  He has a new ulcer on the dorsal hallux at the IPJ 2/2 mallet toe.     Plan:  - Pt seen and evaluated.  -Tyloma was debrided as described on bilateral halluces.  - Recommend that he use a pumice stone on the calluses, instead of sharps.   -Start dressings on the halluces as noted above.   -See again in 9 weeks, or sooner if  problems arise.           SRINIVASAN AriasM

## 2022-06-27 NOTE — PROGRESS NOTES
Chief Complaint   Patient presents with     Follow Up     9 week follow up.         HPI: Kareem is a 67 year old male who presents today for further evaluation of toe ulcerations. He relates that he has a wound on the top of the left hallux, that has been present for a couple of weeks. It started after rubbing in a new shoe. He has not been covering the area.      Review of Systems: No n/v/d/f/c/ns/sob/cp    PMH:   Past Medical History:   Diagnosis Date     Hypertension      Neuropathy      Prediabetes      Sleep apnea        PSxH:   Past Surgical History:   Procedure Laterality Date     OPTICAL TRACKING SYSTEM ARTHROPLASTY KNEE Right 1/4/2018    Procedure: OPTICAL TRACKING SYSTEM ARTHROPLASTY KNEE;  Right Total Knee Arthroplasty;  Surgeon: Marcello Beatty MD;  [facility]       Allergies: Ampicillin, Codeine, and Simvastatin    SH:   Social History     Socioeconomic History     Marital status:      Spouse name: Not on file     Number of children: Not on file     Years of education: Not on file     Highest education level: Not on file   Occupational History     Not on file   Tobacco Use     Smoking status: Current Some Day Smoker     Types: Cigars     Smokeless tobacco: Never Used     Tobacco comment: 5-6/week   Substance and Sexual Activity     Alcohol use: Yes     Comment: Drink every 2-3 days     Drug use: No     Sexual activity: Not on file   Other Topics Concern     Not on file   Social History Narrative     Not on file     Social Determinants of Health     Financial Resource Strain: Not on file   Food Insecurity: Not on file   Transportation Needs: Not on file   Physical Activity: Not on file   Stress: Not on file   Social Connections: Not on file   Intimate Partner Violence: Not on file   Housing Stability: Not on file       FH: No family history on file.    Objective:    PT and DP pulses are 2/4 bilaterally. CRT is instant. Positive pedal hair. +1 pitting edema to left dorsal foot.   Gross  sensation is diminished bilaterally.  Protective sensation is diminished as demonstrated with a 5.07G monofilament.  Equinus intact bilaterally. No pain with active or passive ROM of the ankle, MTJ, 1st ray, or halluces bilaterally,.   A diabetic wound is noted at left  Distal third digit has healed over.    Hyperkeratotic lesion is noted to the bilateral distal halluces.  These are at the tips.  These were sharply debrided today, no open lesions were noted underneath there.  On the dorsal aspects of the bilateral halluces, he does have some open  cuts where he has self debrided his calluses.      A pressure wound is noted at left  dorsal hallux at the IPJ measuring 0.2cm x 0.2cm x 0.1cm.    Paulino Classification: 2    Wound base: Red/Granulation    Edges: intact    Drainage: scant/serous    Odor: no    Undermining: no    Bone Exposure: No    Clinical Signs of Infection: No    After obtaining patient consent, the wound was irrigated with copious amounts of salin    Barriers to Healing: Wound is in an area of pressure. Previous delayed wound healing.     Treatment Plan: Iodosorb and Primapore.     Pt Ability to Follow Plan: Good.           Assessment: Juan Manuel is a 66-year-old type II diabetic with neuropathy and osteomyelitis of the left third digit.    He does have hyperkeratotic lesions with intrasubstance bleeding noted.  He has a new ulcer on the dorsal hallux at the IPJ 2/2 mallet toe.     Plan:  - Pt seen and evaluated.  -Tyloma was debrided as described on bilateral halluces.  - Recommend that he use a pumice stone on the calluses, instead of sharps.   -Start dressings on the halluces as noted above.   -See again in 9 weeks, or sooner if problems arise.

## 2022-08-29 ENCOUNTER — OFFICE VISIT (OUTPATIENT)
Dept: ORTHOPEDICS | Facility: CLINIC | Age: 67
End: 2022-08-29
Payer: MEDICARE

## 2022-08-29 DIAGNOSIS — E11.49 TYPE II OR UNSPECIFIED TYPE DIABETES MELLITUS WITH NEUROLOGICAL MANIFESTATIONS, NOT STATED AS UNCONTROLLED(250.60) (H): Primary | ICD-10-CM

## 2022-08-29 DIAGNOSIS — L60.3 NAIL DYSTROPHY: ICD-10-CM

## 2022-08-29 DIAGNOSIS — L84 TYLOMA: ICD-10-CM

## 2022-08-29 PROCEDURE — 99213 OFFICE O/P EST LOW 20 MIN: CPT | Performed by: PODIATRIST

## 2022-08-29 NOTE — LETTER
8/29/2022         RE: Kareem Bai  [address]  [facility] [address]        Dear Colleague,    Thank you for referring your patient, Kareem Bai, to the [facility] [place]. Please see a copy of my visit note below.    Chief Complaint   Patient presents with     Follow Up     9 week follow up.          HPI: Kareem is a 67 year old male who presents today for further evaluation of toe ulcerations. Overall, he has been doing well.  He does have a callus on the end of the left second digit today.  He also notes that his right big toenail is splitting    Review of Systems: No n/v/d/f/c/ns/sob/cp    PMH:   Past Medical History:   Diagnosis Date     Hypertension      Neuropathy      Prediabetes      Sleep apnea        PSxH:   Past Surgical History:   Procedure Laterality Date     OPTICAL TRACKING SYSTEM ARTHROPLASTY KNEE Right 1/4/2018    Procedure: OPTICAL TRACKING SYSTEM ARTHROPLASTY KNEE;  Right Total Knee Arthroplasty;  Surgeon: Marcello Beatty MD;  [facility]: [facility] OR       Allergies: Ampicillin, Codeine, and Simvastatin    SH:   Social History     Socioeconomic History     Marital status:      Spouse name: Not on file     Number of children: Not on file     Years of education: Not on file     Highest education level: Not on file   Occupational History     Not on file   Tobacco Use     Smoking status: Current Some Day Smoker     Types: Cigars     Smokeless tobacco: Never Used     Tobacco comment: 5-6/week   Substance and Sexual Activity     Alcohol use: Yes     Comment: Drink every 2-3 days     Drug use: No     Sexual activity: Not on file   Other Topics Concern     Not on file   Social History Narrative     Not on file     Social Determinants of Health     Financial Resource Strain: Not on file   Food Insecurity: Not on file   Transportation Needs: Not on file   Physical Activity: Not on file   Stress: Not on file   Social Connections: Not on file    Intimate Partner Violence: Not on file   Housing Stability: Not on file       FH: No family history on file.    Objective:    PT and DP pulses are 2/4 bilaterally. CRT is instant. Positive pedal hair. +1 pitting edema to left dorsal foot.   Gross sensation is diminished bilaterally.  Protective sensation is diminished as demonstrated with a 5.07G monofilament.  Equinus intact bilaterally. No pain with active or passive ROM of the ankle, MTJ, 1st ray, or halluces bilaterally,.   A diabetic wound is noted at left  Distal third digit has healed over.    Hyperkeratotic lesion is noted to the bilateral distal halluces.  These are at the tips.  These were sharply debrided today, no open lesions were noted underneath there.  He also has a hyperkeratotic lesion on the distal aspect of the left second digit.  There is intrasubstance bleeding.  This was sharply debrided and no wound was noted underneath.  Left hallux nail was split and slightly lytic at the lateral portion.      A pressure wound is noted at left  dorsal hallux at the IPJ is healed.        Assessment: Juan Manuel is a 67-year-old type II diabetic with neuropathy and osteomyelitis of the left third digit.    He does have hyperkeratotic lesions with intrasubstance bleeding noted.  He has a new ulcer on the dorsal hallux at the IPJ 2/2 mallet toe.     Plan:  - Pt seen and evaluated.  -Tyloma was debrided as described on bilateral halluces and the left second digit.  - Recommend that he use a pumice stone on the calluses, instead of sharps.   -Left hallux nail was trimmed back.  -See again in 9 weeks, or sooner if problems arise.         Carmine Santacruz DPM

## 2022-08-29 NOTE — PROGRESS NOTES
Chief Complaint   Patient presents with     Follow Up     9 week follow up.          HPI: Kareem is a 67 year old male who presents today for further evaluation of toe ulcerations. Overall, he has been doing well.  He does have a callus on the end of the left second digit today.  He also notes that his right big toenail is splitting    Review of Systems: No n/v/d/f/c/ns/sob/cp    PMH:   Past Medical History:   Diagnosis Date     Hypertension      Neuropathy      Prediabetes      Sleep apnea        PSxH:   Past Surgical History:   Procedure Laterality Date     OPTICAL TRACKING SYSTEM ARTHROPLASTY KNEE Right 1/4/2018    Procedure: OPTICAL TRACKING SYSTEM ARTHROPLASTY KNEE;  Right Total Knee Arthroplasty;  Surgeon: Marcello Beatty MD;  [facility]       Allergies: Ampicillin, Codeine, and Simvastatin    SH:   Social History     Socioeconomic History     Marital status:      Spouse name: Not on file     Number of children: Not on file     Years of education: Not on file     Highest education level: Not on file   Occupational History     Not on file   Tobacco Use     Smoking status: Current Some Day Smoker     Types: Cigars     Smokeless tobacco: Never Used     Tobacco comment: 5-6/week   Substance and Sexual Activity     Alcohol use: Yes     Comment: Drink every 2-3 days     Drug use: No     Sexual activity: Not on file   Other Topics Concern     Not on file   Social History Narrative     Not on file     Social Determinants of Health     Financial Resource Strain: Not on file   Food Insecurity: Not on file   Transportation Needs: Not on file   Physical Activity: Not on file   Stress: Not on file   Social Connections: Not on file   Intimate Partner Violence: Not on file   Housing Stability: Not on file       FH: No family history on file.    Objective:    PT and DP pulses are 2/4 bilaterally. CRT is instant. Positive pedal hair. +1 pitting edema to left dorsal foot.   Gross sensation is diminished  bilaterally.  Protective sensation is diminished as demonstrated with a 5.07G monofilament.  Equinus intact bilaterally. No pain with active or passive ROM of the ankle, MTJ, 1st ray, or halluces bilaterally,.   A diabetic wound is noted at left  Distal third digit has healed over.    Hyperkeratotic lesion is noted to the bilateral distal halluces.  These are at the tips.  These were sharply debrided today, no open lesions were noted underneath there.  He also has a hyperkeratotic lesion on the distal aspect of the left second digit.  There is intrasubstance bleeding.  This was sharply debrided and no wound was noted underneath.  Left hallux nail was split and slightly lytic at the lateral portion.      A pressure wound is noted at left  dorsal hallux at the IPJ is healed.        Assessment: Juan Manuel is a 67-year-old type II diabetic with neuropathy and osteomyelitis of the left third digit.    He does have hyperkeratotic lesions with intrasubstance bleeding noted.  He has a new ulcer on the dorsal hallux at the IPJ 2/2 mallet toe.     Plan:  - Pt seen and evaluated.  -Tyloma was debrided as described on bilateral halluces and the left second digit.  - Recommend that he use a pumice stone on the calluses, instead of sharps.   -Left hallux nail was trimmed back.  -See again in 9 weeks, or sooner if problems arise.

## 2022-08-29 NOTE — NURSING NOTE
Reason For Visit:   Chief Complaint   Patient presents with     Follow Up     9 week follow up.        Pain Assessment  Patient Currently in Pain: Denies        Allergies   Allergen Reactions     Ampicillin Other (See Comments) and Hives     Codeine GI Disturbance     LW Reaction: gi upset     Simvastatin Other (See Comments)           Mora Field LPN

## 2022-09-17 ENCOUNTER — HEALTH MAINTENANCE LETTER (OUTPATIENT)
Age: 67
End: 2022-09-17

## 2022-10-31 ENCOUNTER — OFFICE VISIT (OUTPATIENT)
Dept: ORTHOPEDICS | Facility: CLINIC | Age: 67
End: 2022-10-31
Payer: MEDICARE

## 2022-10-31 DIAGNOSIS — B35.1 OM (ONYCHOMYCOSIS): ICD-10-CM

## 2022-10-31 DIAGNOSIS — S91.215A LACERATION OF LESSER TOE OF LEFT FOOT WITHOUT FOREIGN BODY WITH DAMAGE TO NAIL, INITIAL ENCOUNTER: ICD-10-CM

## 2022-10-31 DIAGNOSIS — L84 TYLOMA: ICD-10-CM

## 2022-10-31 DIAGNOSIS — E11.49 TYPE II OR UNSPECIFIED TYPE DIABETES MELLITUS WITH NEUROLOGICAL MANIFESTATIONS, NOT STATED AS UNCONTROLLED(250.60) (H): Primary | ICD-10-CM

## 2022-10-31 PROCEDURE — 11056 PARNG/CUTG B9 HYPRKR LES 2-4: CPT | Performed by: PODIATRIST

## 2022-10-31 PROCEDURE — 99213 OFFICE O/P EST LOW 20 MIN: CPT | Mod: 25 | Performed by: PODIATRIST

## 2022-10-31 NOTE — LETTER
10/31/2022         RE: Kareem Bai  [address]  [facility] [address]        Dear Colleague,    Thank you for referring your patient, Kareem Bai, to the [facility] [place]. Please see a copy of my visit note below.    Chief Complaint   Patient presents with     Follow Up     9 week follow up.            HPI: Kareem is a 67 year old male who presents today for a diabetic foot exam and management.  He sees Dr. Thaddeus MD, for his diabetic care.  He was last seen on May 5 of this year.  He has tried to cut his nails and now has some lacerations to the left foot.  Otherwise, no changes in his feet.  He relates that his neuropathy is getting worse.  He has taken gabapentin, but he did not want to try this again, as it did not do much to control his neuropathic symptoms.    Review of Systems: No n/v/d/f/c/ns/sob/cp    PMH:   Past Medical History:   Diagnosis Date     Hypertension      Neuropathy      Prediabetes      Sleep apnea        PSxH:   Past Surgical History:   Procedure Laterality Date     OPTICAL TRACKING SYSTEM ARTHROPLASTY KNEE Right 1/4/2018    Procedure: OPTICAL TRACKING SYSTEM ARTHROPLASTY KNEE;  Right Total Knee Arthroplasty;  Surgeon: Marcello Beatty MD;  [facility]       Allergies: Ampicillin, Codeine, and Simvastatin    SH:   Social History     Socioeconomic History     Marital status:      Spouse name: Not on file     Number of children: Not on file     Years of education: Not on file     Highest education level: Not on file   Occupational History     Not on file   Tobacco Use     Smoking status: Some Days     Types: Cigars     Smokeless tobacco: Never     Tobacco comments:     5-6/week   Substance and Sexual Activity     Alcohol use: Yes     Comment: Drink every 2-3 days     Drug use: No     Sexual activity: Not on file   Other Topics Concern     Not on file   Social History Narrative     Not on file     Social Determinants of  Health     Financial Resource Strain: Not on file   Food Insecurity: Not on file   Transportation Needs: Not on file   Physical Activity: Not on file   Stress: Not on file   Social Connections: Not on file   Intimate Partner Violence: Not on file   Housing Stability: Not on file       FH: No family history on file.    Objective:    PT and DP pulses are 2/4 bilaterally. CRT is instant. Positive pedal hair. +1 pitting edema to left dorsal foot.   Gross sensation is diminished bilaterally.  Protective sensation is diminished as demonstrated with a 5.07G monofilament.  Equinus intact bilaterally. No pain with active or passive ROM of the ankle, MTJ, 1st ray, or halluces bilaterally,.   A diabetic wound is noted at left  Distal third digit has healed over.    Hyperkeratotic lesion is noted to the bilateral distal halluces.  These are at the tips.  These were sharply debrided today, no open lesions were noted underneath there.  He also has a hyperkeratotic lesion on the distal aspect of the left second digit.  There is intrasubstance bleeding.  This was sharply debrided and no wound was noted underneath.  On the left foot he has lacerations to the second, third, and fourth  nail beds.  On the second nail, there is a deeper laceration to the medial aspect.  There are no signs or symptoms of infection, but it does have serous drainage.    Assessment: Juan Manuel is a 67-year-old type II diabetic with neuropathy and osteomyelitis of the left third digit.    He does have hyperkeratotic lesions with intrasubstance bleeding noted.  He has self debrided his nails.  He has lacerations to the distal aspects of the left second, third, and fourth digits.  I discussed with him the laceration on second digit is a little deeper.  He should keep an eye on this to make sure it does not get infected.  He should keep this covered every day with antibiotic ointment and a Band-Aid.  He do this until its healed.    Plan:  - Pt seen and  evaluated.  -Tyloma was debrided as described on bilateral halluces and the left second digit.  - Recommend that he use a pumice stone on the calluses, instead of sharps.   -Right fifth nail was trimmed back.  -He should keep the left second injury covered with antibiotic ointment and a Band-Aid for 7 days  -See again in 9 weeks, or sooner if problems arise.

## 2022-10-31 NOTE — PROGRESS NOTES
Chief Complaint   Patient presents with     Follow Up     9 week follow up.            HPI: Kareem is a 67 year old male who presents today for a diabetic foot exam and management.  He sees Dr. Thaddeus MD, for his diabetic care.  He was last seen on May 5 of this year.  He has tried to cut his nails and now has some lacerations to the left foot.  Otherwise, no changes in his feet.  He relates that his neuropathy is getting worse.  He has taken gabapentin, but he did not want to try this again, as it did not do much to control his neuropathic symptoms.    Review of Systems: No n/v/d/f/c/ns/sob/cp    PMH:   Past Medical History:   Diagnosis Date     Hypertension      Neuropathy      Prediabetes      Sleep apnea        PSxH:   Past Surgical History:   Procedure Laterality Date     OPTICAL TRACKING SYSTEM ARTHROPLASTY KNEE Right 1/4/2018    Procedure: OPTICAL TRACKING SYSTEM ARTHROPLASTY KNEE;  Right Total Knee Arthroplasty;  Surgeon: Marcello Beatty MD;  Location: [facility]       Allergies: Ampicillin, Codeine, and Simvastatin    SH:   Social History     Socioeconomic History     Marital status:      Spouse name: Not on file     Number of children: Not on file     Years of education: Not on file     Highest education level: Not on file   Occupational History     Not on file   Tobacco Use     Smoking status: Some Days     Types: Cigars     Smokeless tobacco: Never     Tobacco comments:     5-6/week   Substance and Sexual Activity     Alcohol use: Yes     Comment: Drink every 2-3 days     Drug use: No     Sexual activity: Not on file   Other Topics Concern     Not on file   Social History Narrative     Not on file     Social Determinants of Health     Financial Resource Strain: Not on file   Food Insecurity: Not on file   Transportation Needs: Not on file   Physical Activity: Not on file   Stress: Not on file   Social Connections: Not on file   Intimate Partner Violence: Not on file   Housing Stability: Not on  file       FH: No family history on file.    Objective:    PT and DP pulses are 2/4 bilaterally. CRT is instant. Positive pedal hair. +1 pitting edema to left dorsal foot.   Gross sensation is diminished bilaterally.  Protective sensation is diminished as demonstrated with a 5.07G monofilament.  Equinus intact bilaterally. No pain with active or passive ROM of the ankle, MTJ, 1st ray, or halluces bilaterally,.   A diabetic wound is noted at left  Distal third digit has healed over.    Hyperkeratotic lesion is noted to the bilateral distal halluces.  These are at the tips.  These were sharply debrided today, no open lesions were noted underneath there.  He also has a hyperkeratotic lesion on the distal aspect of the left second digit.  There is intrasubstance bleeding.  This was sharply debrided and no wound was noted underneath.  On the left foot he has lacerations to the second, third, and fourth  nail beds.  On the second nail, there is a deeper laceration to the medial aspect.  There are no signs or symptoms of infection, but it does have serous drainage.    Assessment: Juan Manuel is a 67-year-old type II diabetic with neuropathy and osteomyelitis of the left third digit.    He does have hyperkeratotic lesions with intrasubstance bleeding noted.  He has self debrided his nails.  He has lacerations to the distal aspects of the left second, third, and fourth digits.  I discussed with him the laceration on second digit is a little deeper.  He should keep an eye on this to make sure it does not get infected.  He should keep this covered every day with antibiotic ointment and a Band-Aid.  He do this until its healed.    Plan:  - Pt seen and evaluated.  -Tyloma was debrided as described on bilateral halluces and the left second digit.  - Recommend that he use a pumice stone on the calluses, instead of sharps.   -Right fifth nail was trimmed back.  -He should keep the left second injury covered with antibiotic ointment and a  Band-Aid for 7 days  -See again in 9 weeks, or sooner if problems arise.

## 2023-01-23 ENCOUNTER — HEALTH MAINTENANCE LETTER (OUTPATIENT)
Age: 68
End: 2023-01-23

## 2023-05-06 ENCOUNTER — HEALTH MAINTENANCE LETTER (OUTPATIENT)
Age: 68
End: 2023-05-06

## 2023-06-30 ENCOUNTER — OFFICE VISIT (OUTPATIENT)
Dept: ORTHOPEDICS | Facility: CLINIC | Age: 68
End: 2023-06-30
Payer: MEDICARE

## 2023-06-30 DIAGNOSIS — E11.49 TYPE II OR UNSPECIFIED TYPE DIABETES MELLITUS WITH NEUROLOGICAL MANIFESTATIONS, NOT STATED AS UNCONTROLLED(250.60) (H): Primary | ICD-10-CM

## 2023-06-30 DIAGNOSIS — L84 TYLOMA: ICD-10-CM

## 2023-06-30 PROCEDURE — 99213 OFFICE O/P EST LOW 20 MIN: CPT | Performed by: PODIATRIST

## 2023-06-30 NOTE — LETTER
6/30/2023         RE: Kareem Bai  [address]  [facility] [address]        Dear Colleague,    Thank you for referring your patient, Kareem Bai, to the [facility] [place]. Please see a copy of my visit note below.    Chief Complaint   Patient presents with    Follow Up     Diabetic foot care.           HPI: Kareem is a 67 year old male who presents today for a diabetic foot exam and management.  He sees Dr. Thaddeus MD, for his diabetic care.  He was last seen on May 5 of this year.  He has tried to cut his nails and now has some lacerations to the left foot.  Otherwise, no changes in his feet.  He relates that his neuropathy is getting worse.  He has taken gabapentin, but he did not want to try this again, as it did not do much to control his neuropathic symptoms.    Review of Systems: No n/v/d/f/c/ns/sob/cp    PMH:   Past Medical History:   Diagnosis Date    Hypertension     Neuropathy     Prediabetes     Sleep apnea        PSxH:   Past Surgical History:   Procedure Laterality Date    OPTICAL TRACKING SYSTEM ARTHROPLASTY KNEE Right 1/4/2018    Procedure: OPTICAL TRACKING SYSTEM ARTHROPLASTY KNEE;  Right Total Knee Arthroplasty;  Surgeon: Marcello Beatty MD;  Location: [facility]       Allergies: Ampicillin, Codeine, and Simvastatin    SH:   Social History     Socioeconomic History    Marital status:      Spouse name: Not on file    Number of children: Not on file    Years of education: Not on file    Highest education level: Not on file   Occupational History    Not on file   Tobacco Use    Smoking status: Some Days     Types: Cigars    Smokeless tobacco: Never    Tobacco comments:     5-6/week   Substance and Sexual Activity    Alcohol use: Yes     Comment: Drink every 2-3 days    Drug use: No    Sexual activity: Not on file   Other Topics Concern    Not on file   Social History Narrative    Not on file     Social Determinants of Health      Financial Resource Strain: Not on file   Food Insecurity: Not on file   Transportation Needs: Not on file   Physical Activity: Not on file   Stress: Not on file   Social Connections: Not on file   Intimate Partner Violence: Not on file   Housing Stability: Not on file       FH: No family history on file.    Objective:    PT and DP pulses are 2/4 bilaterally. CRT is instant. Positive pedal hair. +1 pitting edema to left dorsal foot.   Gross sensation is diminished bilaterally.  Protective sensation is diminished as demonstrated with a 5.07G monofilament.  Equinus intact bilaterally. No pain with active or passive ROM of the ankle, MTJ, 1st ray, or halluces bilaterally,.   A diabetic wound is noted at left  Distal third digit has healed over.    Hyperkeratotic lesion is noted to the bilateral distal halluces.  These are at the tips.  These were sharply debrided today, no open lesions were noted underneath there.  He also has a hyperkeratotic lesion on the distal aspect of the left second digit.  There is intrasubstance bleeding.  This was sharply debrided and no wound was noted underneath.  On the left foot he has lacerations to the second, third, and fourth  nail beds.  On the second nail, there is a deeper laceration to the medial aspect.  There are no signs or symptoms of infection, but it does have serous drainage.    Assessment: Juan Manuel is a 67-year-old type II diabetic with neuropathy and osteomyelitis of the left third digit.    He does have hyperkeratotic lesions with intrasubstance bleeding noted.  He has self debrided his nails.  He has lacerations to the distal aspects of the left second, third, and fourth digits.  I discussed with him the laceration on second digit is a little deeper.  He should keep an eye on this to make sure it does not get infected.  He should keep this covered every day with antibiotic ointment and a Band-Aid.  He do this until its healed.    Plan:  - Pt seen and evaluated.  -Christie  was debrided as described on bilateral halluces and the left second digit.  - Recommend that he use a pumice stone on the calluses, instead of sharps.   -Right fifth nail was trimmed back.  -He should keep the left second injury covered with antibiotic ointment and a Band-Aid for 7 days  -See again in 9 weeks, or sooner if problems arise.             Chief Complaint   Patient presents with    Follow Up     Diabetic foot care.           HPI: Kareem is a 67 year old male who presents today for a diabetic foot exam and management.  He sees Dr. Thaddeus MD, for his diabetic care.  He was last seen on 6/22/23 of this year.  He has tried to cut his nails and now has some lacerations to the right 2nd digit on the right foot.  Otherwise, no changes in his feet.      Review of Systems: No n/v/d/f/c/ns/sob/cp    PMH:   Past Medical History:   Diagnosis Date    Hypertension     Neuropathy     Prediabetes     Sleep apnea        PSxH:   Past Surgical History:   Procedure Laterality Date    OPTICAL TRACKING SYSTEM ARTHROPLASTY KNEE Right 1/4/2018    Procedure: OPTICAL TRACKING SYSTEM ARTHROPLASTY KNEE;  Right Total Knee Arthroplasty;  Surgeon: Marcello Beatty MD;  Location: [facility]       Allergies: Ampicillin, Codeine, and Simvastatin    SH:   Social History     Socioeconomic History    Marital status:      Spouse name: Not on file    Number of children: Not on file    Years of education: Not on file    Highest education level: Not on file   Occupational History    Not on file   Tobacco Use    Smoking status: Some Days     Types: Cigars    Smokeless tobacco: Never    Tobacco comments:     5-6/week   Substance and Sexual Activity    Alcohol use: Yes     Comment: Drink every 2-3 days    Drug use: No    Sexual activity: Not on file   Other Topics Concern    Not on file   Social History Narrative    Not on file     Social Determinants of Health     Financial Resource Strain: Not on file   Food Insecurity: Not on file    Transportation Needs: Not on file   Physical Activity: Not on file   Stress: Not on file   Social Connections: Not on file   Intimate Partner Violence: Not on file   Housing Stability: Not on file       FH: No family history on file.    Objective:    PT and DP pulses are 2/4 bilaterally. CRT is instant. Positive pedal hair. +1 pitting edema to left dorsal foot.   Gross sensation is diminished bilaterally.  Protective sensation is diminished as demonstrated with a 5.07G monofilament.  Equinus intact bilaterally. No pain with active or passive ROM of the ankle, MTJ, 1st ray, or halluces bilaterally,.   A diabetic wound is noted at left  Distal third digit has healed over.    Hyperkeratotic lesion is noted to the bilateral distal halluces.  These are at the tips.  These were sharply debrided today, no open lesions were noted underneath there.  Small iatrogenic lesion created with trimming away the callus on the distal right hallux.  He also has a hyperkeratotic lesion on the distal aspect of the left second digit.  There is intrasubstance bleeding.  This was sharply debrided and no wound was noted underneath.      Assessment: Juan Manuel is a 67-year-old type II diabetic with neuropathy and osteomyelitis of the left third digit.    He does have hyperkeratotic lesions with intrasubstance bleeding noted.  He has self debrided his nails.     Plan:  - Pt seen and evaluated.  -Tyloma was debrided as described on bilateral halluces and the left second digit.  - Recommend that he use a pumice stone on the calluses, instead of sharps.   -He should keep the right hallux and second injury covered with antibiotic ointment and a Band-Aid for 7 days  -See again in 9 weeks, or sooner if problems arise.         Carmine Santacruz DPM

## 2023-06-30 NOTE — PROGRESS NOTES
Chief Complaint   Patient presents with     Follow Up     Diabetic foot care.           HPI: Kareem is a 67 year old male who presents today for a diabetic foot exam and management.  He sees Dr. Thaddeus MD, for his diabetic care.  He was last seen on 6/22/23 of this year.  He has tried to cut his nails and now has some lacerations to the right 2nd digit on the right foot.  Otherwise, no changes in his feet.      Review of Systems: No n/v/d/f/c/ns/sob/cp    PMH:   Past Medical History:   Diagnosis Date     Hypertension      Neuropathy      Prediabetes      Sleep apnea        PSxH:   Past Surgical History:   Procedure Laterality Date     OPTICAL TRACKING SYSTEM ARTHROPLASTY KNEE Right 1/4/2018    Procedure: OPTICAL TRACKING SYSTEM ARTHROPLASTY KNEE;  Right Total Knee Arthroplasty;  Surgeon: Marcello Beatty MD;  Location: [facility]       Allergies: Ampicillin, Codeine, and Simvastatin    SH:   Social History     Socioeconomic History     Marital status:      Spouse name: Not on file     Number of children: Not on file     Years of education: Not on file     Highest education level: Not on file   Occupational History     Not on file   Tobacco Use     Smoking status: Some Days     Types: Cigars     Smokeless tobacco: Never     Tobacco comments:     5-6/week   Substance and Sexual Activity     Alcohol use: Yes     Comment: Drink every 2-3 days     Drug use: No     Sexual activity: Not on file   Other Topics Concern     Not on file   Social History Narrative     Not on file     Social Determinants of Health     Financial Resource Strain: Not on file   Food Insecurity: Not on file   Transportation Needs: Not on file   Physical Activity: Not on file   Stress: Not on file   Social Connections: Not on file   Intimate Partner Violence: Not on file   Housing Stability: Not on file       FH: No family history on file.    Objective:    PT and DP pulses are 2/4 bilaterally. CRT is instant. Positive pedal hair. +1 pitting  edema to left dorsal foot.   Gross sensation is diminished bilaterally.  Protective sensation is diminished as demonstrated with a 5.07G monofilament.  Equinus intact bilaterally. No pain with active or passive ROM of the ankle, MTJ, 1st ray, or halluces bilaterally,.   A diabetic wound is noted at left  Distal third digit has healed over.    Hyperkeratotic lesion is noted to the bilateral distal halluces.  These are at the tips.  These were sharply debrided today, no open lesions were noted underneath there.  Small iatrogenic lesion created with trimming away the callus on the distal right hallux.  He also has a hyperkeratotic lesion on the distal aspect of the left second digit.  There is intrasubstance bleeding.  This was sharply debrided and no wound was noted underneath.      Assessment: Juan Manuel is a 67-year-old type II diabetic with neuropathy and osteomyelitis of the left third digit.    He does have hyperkeratotic lesions with intrasubstance bleeding noted.  He has self debrided his nails.     Plan:  - Pt seen and evaluated.  -Tyloma was debrided as described on bilateral halluces and the left second digit.  - Recommend that he use a pumice stone on the calluses, instead of sharps.   -He should keep the right hallux and second injury covered with antibiotic ointment and a Band-Aid for 7 days  -See again in 9 weeks, or sooner if problems arise.

## 2023-06-30 NOTE — PROGRESS NOTES
Chief Complaint   Patient presents with     Follow Up     Diabetic foot care.           HPI: Kareem is a 67 year old male who presents today for a diabetic foot exam and management.  He sees Dr. Thaddeus MD, for his diabetic care.  He was last seen on May 5 of this year.  He has tried to cut his nails and now has some lacerations to the left foot.  Otherwise, no changes in his feet.  He relates that his neuropathy is getting worse.  He has taken gabapentin, but he did not want to try this again, as it did not do much to control his neuropathic symptoms.    Review of Systems: No n/v/d/f/c/ns/sob/cp    PMH:   Past Medical History:   Diagnosis Date     Hypertension      Neuropathy      Prediabetes      Sleep apnea        PSxH:   Past Surgical History:   Procedure Laterality Date     OPTICAL TRACKING SYSTEM ARTHROPLASTY KNEE Right 1/4/2018    Procedure: OPTICAL TRACKING SYSTEM ARTHROPLASTY KNEE;  Right Total Knee Arthroplasty;  Surgeon: Marcello Beatty MD;  Location: [facility]       Allergies: Ampicillin, Codeine, and Simvastatin    SH:   Social History     Socioeconomic History     Marital status:      Spouse name: Not on file     Number of children: Not on file     Years of education: Not on file     Highest education level: Not on file   Occupational History     Not on file   Tobacco Use     Smoking status: Some Days     Types: Cigars     Smokeless tobacco: Never     Tobacco comments:     5-6/week   Substance and Sexual Activity     Alcohol use: Yes     Comment: Drink every 2-3 days     Drug use: No     Sexual activity: Not on file   Other Topics Concern     Not on file   Social History Narrative     Not on file     Social Determinants of Health     Financial Resource Strain: Not on file   Food Insecurity: Not on file   Transportation Needs: Not on file   Physical Activity: Not on file   Stress: Not on file   Social Connections: Not on file   Intimate Partner Violence: Not on file   Housing Stability: Not on  file       FH: No family history on file.    Objective:    PT and DP pulses are 2/4 bilaterally. CRT is instant. Positive pedal hair. +1 pitting edema to left dorsal foot.   Gross sensation is diminished bilaterally.  Protective sensation is diminished as demonstrated with a 5.07G monofilament.  Equinus intact bilaterally. No pain with active or passive ROM of the ankle, MTJ, 1st ray, or halluces bilaterally,.   A diabetic wound is noted at left  Distal third digit has healed over.    Hyperkeratotic lesion is noted to the bilateral distal halluces.  These are at the tips.  These were sharply debrided today, no open lesions were noted underneath there.  He also has a hyperkeratotic lesion on the distal aspect of the left second digit.  There is intrasubstance bleeding.  This was sharply debrided and no wound was noted underneath.  On the left foot he has lacerations to the second, third, and fourth  nail beds.  On the second nail, there is a deeper laceration to the medial aspect.  There are no signs or symptoms of infection, but it does have serous drainage.    Assessment: Juan Manuel is a 67-year-old type II diabetic with neuropathy and osteomyelitis of the left third digit.    He does have hyperkeratotic lesions with intrasubstance bleeding noted.  He has self debrided his nails.  He has lacerations to the distal aspects of the left second, third, and fourth digits.  I discussed with him the laceration on second digit is a little deeper.  He should keep an eye on this to make sure it does not get infected.  He should keep this covered every day with antibiotic ointment and a Band-Aid.  He do this until its healed.    Plan:  - Pt seen and evaluated.  -Tyloma was debrided as described on bilateral halluces and the left second digit.  - Recommend that he use a pumice stone on the calluses, instead of sharps.   -Right fifth nail was trimmed back.  -He should keep the left second injury covered with antibiotic ointment and a  Band-Aid for 7 days  -See again in 9 weeks, or sooner if problems arise.

## 2023-07-25 ENCOUNTER — MEDICAL CORRESPONDENCE (OUTPATIENT)
Dept: HEALTH INFORMATION MANAGEMENT | Facility: CLINIC | Age: 68
End: 2023-07-25
Payer: MEDICARE

## 2023-07-25 DIAGNOSIS — M21.42 PES PLANUS OF BOTH FEET: ICD-10-CM

## 2023-07-25 DIAGNOSIS — M21.41 PES PLANUS OF BOTH FEET: ICD-10-CM

## 2023-07-25 DIAGNOSIS — E11.49 TYPE II OR UNSPECIFIED TYPE DIABETES MELLITUS WITH NEUROLOGICAL MANIFESTATIONS, NOT STATED AS UNCONTROLLED(250.60) (H): Primary | ICD-10-CM

## 2023-07-26 ENCOUNTER — TELEPHONE (OUTPATIENT)
Dept: WOUND CARE | Facility: CLINIC | Age: 68
End: 2023-07-26
Payer: MEDICARE

## 2023-07-26 NOTE — TELEPHONE ENCOUNTER
FYI - Status Update    Who is Calling: patient calling to verify orthotic order placed and if possible to fax to [facility] in [place]  Fax: [phone]     Update: te sent to clinic    Does caller want a call/response back: Yes     Could we send this information to you in [facility] or would you prefer to receive a phone call?:   Patient would prefer a phone call   Okay to leave a detailed message?: Yes at Cell number on file:    Telephone Information:   Mobile [phone]

## 2023-08-02 ENCOUNTER — TRANSCRIBE ORDERS (OUTPATIENT)
Dept: ORTHOPEDICS | Facility: CLINIC | Age: 68
End: 2023-08-02
Payer: MEDICARE

## 2023-08-02 DIAGNOSIS — M25.562 LEFT KNEE PAIN, UNSPECIFIED CHRONICITY: Primary | ICD-10-CM

## 2023-08-02 DIAGNOSIS — Z96.652 S/P TOTAL KNEE ARTHROPLASTY, LEFT: ICD-10-CM

## 2023-08-19 NOTE — PROGRESS NOTES
PHYSICAL THERAPY EVALUATION  Type of Visit: Evaluation    See electronic medical record for Abuse and Falls Screening details.    Subjective       Presenting condition or subjective complaint: left knee replacement 8/17/23. Reports pain management has been challenging. Ambulates into clinic using bilateral axillary crutches. Reports pain with any and all movement. Difficulty sleeping due to pain.   Date of onset: 08/17/23    Relevant medical history: Chest pain; Concussions; Diabetes; Hearing problems; High blood pressure; Non-healing wounds; Pain at night or rest; Seizures; Smoking   Dates & types of surgery: 8/17/23 - L TKA    Prior diagnostic imaging/testing results: X-ray; MRI     Prior therapy history for the same diagnosis, illness or injury: No      Living Environment  Social support: With a significant other or spouse   Type of home: 2-story; Basement   Stairs to enter the home: Yes (2 from garage - no rail; 3 to front door - railing)   Is there a railing: Yes   Ramp: No   Stairs inside the home: Yes 13     Help at home: Self Cares (home health aide/personal care attendant, family, etc)  Equipment owned: Straight Cane; Walker; Crutches     Employment: No    Hobbies/Interests:      Patient goals for therapy: everything    Pain assessment: Pain present  See objective evaluation for additional pain details     Objective   KNEE EVALUATION  PAIN: Pain Level at Rest: 5/10  Pain Level with Use: 9/10  Pain Location: knee  Pain Quality: Aching, Burning, Sharp, and Shooting  Pain Frequency: constant  INTEGUMENTARY (edema, incisions): Sweep Test: 3+  GAIT:  Weightbearing Status: WBAT  Assistive Device(s): Crutches (bilateral)  Gait Deviations: Antalgic  Stance time decreased  Stride length decreased  Increased hip flexion; ambulates with 2 pt gait pattern, decreased L knee flexion, IC with foot flat   ROM:   (Degrees) Left AROM Left PROM  Right AROM Right PROM   Knee Flexion 78  130    Knee Extension 15  0    Pain:    End feel:   Circumerence: at joint line: R 36, 4 cm above joint line: 40cm, 8 cm above joint line  41cm;        L  41cm                                    46cm,         47 cm   STRENGTH:   Pain: - none + mild ++ moderate +++ severe  Strength Scale: 0-5/5 Left Right   Knee Flexion +++ (severe) 4+   Knee Extension ++ (mod) 4+   Quad Set 4-, ++ (mod) 4+   PALPATION:  mod inflammation around L knee, and calf   JOINT MOBILITY: WFL, slight pain with patellar glides LLE     Assessment & Plan   CLINICAL IMPRESSIONS  Medical Diagnosis: Left knee pain, unspecified chronicity  S/P total knee arthroplasty, left    Treatment Diagnosis: S/P total knee arthroplasty, left   Impression/Assessment: Patient is a 68 year old male with left knee complaints.  The following significant findings have been identified: Pain, Decreased ROM/flexibility, Decreased joint mobility, Decreased strength, Impaired balance, Decreased proprioception, Impaired gait, and Decreased activity tolerance. These impairments interfere with their ability to perform self care tasks, recreational activities, household chores, driving , household mobility, and community mobility as compared to previous level of function.     Clinical Decision Making (Complexity):  Clinical Presentation: Stable/Uncomplicated  Clinical Presentation Rationale: based on medical and personal factors listed in PT evaluation  Clinical Decision Making (Complexity): Low complexity    PLAN OF CARE  Treatment Interventions:  Modalities: Cryotherapy  Interventions: Gait Training, Manual Therapy, Neuromuscular Re-education, Therapeutic Activity, Therapeutic Exercise, Self-Care/Home Management    Long Term Goals     PT Goal 1  Goal Identifier: LTG 1  Goal Description: Patient will ambulate without AD on even terrain  utilizing appropriate gait mechanics without compensation for 20 min with PL 3/10 or less  Rationale: to maximize safety and independence with performance of ADLs and functional  tasks;to maximize safety and independence within the community  Target Date: 10/30/23      Frequency of Treatment: 2x/week for 2 weeks, 1x/ week for 8 weeks  Duration of Treatment: 10 weeks    Recommended Referrals to Other Professionals: Physical Therapy  Education Assessment:   Learner/Method: Patient;Listening;No Barriers to Learning  Education Comments: education on goals for initial rehab inclduing inflammation control, pain mangement and ROM; education on inflammation using ice, elevating LLE, and ankle pumps frequently; education on HEP frequency and importance of HEP for improved results    Risks and benefits of evaluation/treatment have been explained.   Patient/Family/caregiver agrees with Plan of Care.     Evaluation Time:     PT Eval, Low Complexity Minutes (45275): 12     Signing Clinician: Ashley Alarcon PT      [facility]                                                                                   OUTPATIENT PHYSICAL THERAPY      PLAN OF TREATMENT FOR OUTPATIENT REHABILITATION   Patient's Last Name, First Name, KARENKareem Sepulveda YOB: 1955   Provider's Name   [facility]   Medical Record No.  [id number]     Onset Date: 08/17/23  Start of Care Date: 08/21/23     Medical Diagnosis:  Left knee pain, unspecified chronicity  S/P total knee arthroplasty, left      PT Treatment Diagnosis:  S/P total knee arthroplasty, left Plan of Treatment  Frequency/Duration: 2x/week for 2 weeks, 1x/ week for 8 weeks/ 10 weeks    Certification date from 08/21/23 to 10/30/23         See note for plan of treatment details and functional goals     Ashley Alarcon PT                         I CERTIFY THE NEED FOR THESE SERVICES FURNISHED UNDER        THIS PLAN OF TREATMENT AND WHILE UNDER MY CARE .             Physician Signature               Date    X_____________________________________________________                    Referring  Provider:  González Larsen      Initial Assessment  See Epic Evaluation- Start of Care Date: 08/21/23

## 2023-08-21 ENCOUNTER — THERAPY VISIT (OUTPATIENT)
Dept: PHYSICAL THERAPY | Facility: CLINIC | Age: 68
End: 2023-08-21
Attending: ORTHOPAEDIC SURGERY
Payer: MEDICARE

## 2023-08-21 DIAGNOSIS — Z96.652 S/P TOTAL KNEE ARTHROPLASTY, LEFT: ICD-10-CM

## 2023-08-21 DIAGNOSIS — M25.562 LEFT KNEE PAIN, UNSPECIFIED CHRONICITY: ICD-10-CM

## 2023-08-21 PROCEDURE — 97161 PT EVAL LOW COMPLEX 20 MIN: CPT | Mod: GP

## 2023-08-21 PROCEDURE — 97140 MANUAL THERAPY 1/> REGIONS: CPT | Mod: GP

## 2023-08-21 PROCEDURE — 97110 THERAPEUTIC EXERCISES: CPT | Mod: GP

## 2023-08-21 PROCEDURE — 97010 HOT OR COLD PACKS THERAPY: CPT | Mod: GP

## 2023-08-21 ASSESSMENT — ACTIVITIES OF DAILY LIVING (ADL)
SQUAT: I AM UNABLE TO DO THE ACTIVITY
HOW_WOULD_YOU_RATE_THE_CURRENT_FUNCTION_OF_YOUR_KNEE_DURING_YOUR_USUAL_DAILY_ACTIVITIES_ON_A_SCALE_FROM_0_TO_100_WITH_100_BEING_YOUR_LEVEL_OF_KNEE_FUNCTION_PRIOR_TO_YOUR_INJURY_AND_0_BEING_THE_INABILITY_TO_PERFORM_ANY_OF_YOUR_USUAL_DAILY_ACTIVITIES?: 0
RISE FROM A CHAIR: ACTIVITY IS VERY DIFFICULT
GO DOWN STAIRS: ACTIVITY IS VERY DIFFICULT
HOW_WOULD_YOU_RATE_THE_OVERALL_FUNCTION_OF_YOUR_KNEE_DURING_YOUR_USUAL_DAILY_ACTIVITIES?: SEVERELY ABNORMAL
AS_A_RESULT_OF_YOUR_KNEE_INJURY,_HOW_WOULD_YOU_RATE_YOUR_CURRENT_LEVEL_OF_DAILY_ACTIVITY?: SEVERELY ABNORMAL
SIT WITH YOUR KNEE BENT: ACTIVITY IS VERY DIFFICULT
STAND: ACTIVITY IS FAIRLY DIFFICULT
GO UP STAIRS: ACTIVITY IS VERY DIFFICULT
KNEEL ON THE FRONT OF YOUR KNEE: I AM UNABLE TO DO THE ACTIVITY
KNEE_ACTIVITY_OF_DAILY_LIVING_SUM: 7
WALK: ACTIVITY IS VERY DIFFICULT

## 2023-08-25 ENCOUNTER — THERAPY VISIT (OUTPATIENT)
Dept: PHYSICAL THERAPY | Facility: CLINIC | Age: 68
End: 2023-08-25
Attending: ORTHOPAEDIC SURGERY
Payer: MEDICARE

## 2023-08-25 DIAGNOSIS — M25.562 LEFT KNEE PAIN, UNSPECIFIED CHRONICITY: ICD-10-CM

## 2023-08-25 DIAGNOSIS — Z96.652 S/P TOTAL KNEE ARTHROPLASTY, LEFT: Primary | ICD-10-CM

## 2023-08-25 PROCEDURE — 97140 MANUAL THERAPY 1/> REGIONS: CPT | Mod: GP | Performed by: PHYSICAL THERAPIST

## 2023-08-25 PROCEDURE — 97110 THERAPEUTIC EXERCISES: CPT | Mod: GP | Performed by: PHYSICAL THERAPIST

## 2023-08-28 ENCOUNTER — THERAPY VISIT (OUTPATIENT)
Dept: PHYSICAL THERAPY | Facility: CLINIC | Age: 68
End: 2023-08-28
Attending: ORTHOPAEDIC SURGERY
Payer: MEDICARE

## 2023-08-28 DIAGNOSIS — Z96.652 S/P TOTAL KNEE ARTHROPLASTY, LEFT: Primary | ICD-10-CM

## 2023-08-28 DIAGNOSIS — M25.562 LEFT KNEE PAIN, UNSPECIFIED CHRONICITY: ICD-10-CM

## 2023-08-28 PROCEDURE — 97140 MANUAL THERAPY 1/> REGIONS: CPT | Mod: GP | Performed by: PHYSICAL THERAPIST

## 2023-08-28 PROCEDURE — 97110 THERAPEUTIC EXERCISES: CPT | Mod: GP | Performed by: PHYSICAL THERAPIST

## 2023-09-01 ENCOUNTER — THERAPY VISIT (OUTPATIENT)
Dept: PHYSICAL THERAPY | Facility: CLINIC | Age: 68
End: 2023-09-01
Attending: ORTHOPAEDIC SURGERY
Payer: MEDICARE

## 2023-09-01 DIAGNOSIS — M25.562 LEFT KNEE PAIN, UNSPECIFIED CHRONICITY: ICD-10-CM

## 2023-09-01 DIAGNOSIS — Z96.652 S/P TOTAL KNEE ARTHROPLASTY, LEFT: Primary | ICD-10-CM

## 2023-09-01 PROCEDURE — 97530 THERAPEUTIC ACTIVITIES: CPT | Mod: GP | Performed by: PHYSICAL THERAPIST

## 2023-09-01 PROCEDURE — 97110 THERAPEUTIC EXERCISES: CPT | Mod: GP | Performed by: PHYSICAL THERAPIST

## 2023-09-01 PROCEDURE — 97112 NEUROMUSCULAR REEDUCATION: CPT | Mod: GP | Performed by: PHYSICAL THERAPIST

## 2023-09-06 ENCOUNTER — THERAPY VISIT (OUTPATIENT)
Dept: PHYSICAL THERAPY | Facility: CLINIC | Age: 68
End: 2023-09-06
Attending: ORTHOPAEDIC SURGERY
Payer: MEDICARE

## 2023-09-06 DIAGNOSIS — Z96.652 S/P TOTAL KNEE ARTHROPLASTY, LEFT: Primary | ICD-10-CM

## 2023-09-06 DIAGNOSIS — M25.562 LEFT KNEE PAIN, UNSPECIFIED CHRONICITY: ICD-10-CM

## 2023-09-06 PROCEDURE — 97110 THERAPEUTIC EXERCISES: CPT | Mod: GP | Performed by: PHYSICAL THERAPIST

## 2023-09-06 PROCEDURE — 97530 THERAPEUTIC ACTIVITIES: CPT | Mod: GP | Performed by: PHYSICAL THERAPIST

## 2023-09-08 ENCOUNTER — OFFICE VISIT (OUTPATIENT)
Dept: ORTHOPEDICS | Facility: CLINIC | Age: 68
End: 2023-09-08
Payer: MEDICARE

## 2023-09-08 ENCOUNTER — THERAPY VISIT (OUTPATIENT)
Dept: PHYSICAL THERAPY | Facility: CLINIC | Age: 68
End: 2023-09-08
Attending: ORTHOPAEDIC SURGERY
Payer: MEDICARE

## 2023-09-08 DIAGNOSIS — M25.562 LEFT KNEE PAIN, UNSPECIFIED CHRONICITY: ICD-10-CM

## 2023-09-08 DIAGNOSIS — Z96.652 S/P TOTAL KNEE ARTHROPLASTY, LEFT: Primary | ICD-10-CM

## 2023-09-08 DIAGNOSIS — E11.49 TYPE II OR UNSPECIFIED TYPE DIABETES MELLITUS WITH NEUROLOGICAL MANIFESTATIONS, NOT STATED AS UNCONTROLLED(250.60) (H): Primary | ICD-10-CM

## 2023-09-08 DIAGNOSIS — M21.42 PES PLANUS OF BOTH FEET: ICD-10-CM

## 2023-09-08 DIAGNOSIS — M21.41 PES PLANUS OF BOTH FEET: ICD-10-CM

## 2023-09-08 DIAGNOSIS — L84 TYLOMA: ICD-10-CM

## 2023-09-08 DIAGNOSIS — M20.41 HAMMER TOES OF BOTH FEET: ICD-10-CM

## 2023-09-08 DIAGNOSIS — M20.42 HAMMER TOES OF BOTH FEET: ICD-10-CM

## 2023-09-08 PROCEDURE — 97110 THERAPEUTIC EXERCISES: CPT | Mod: GP | Performed by: PHYSICAL THERAPIST

## 2023-09-08 PROCEDURE — 99213 OFFICE O/P EST LOW 20 MIN: CPT | Performed by: PODIATRIST

## 2023-09-08 PROCEDURE — 97112 NEUROMUSCULAR REEDUCATION: CPT | Mod: GP | Performed by: PHYSICAL THERAPIST

## 2023-09-08 PROCEDURE — 97530 THERAPEUTIC ACTIVITIES: CPT | Mod: GP | Performed by: PHYSICAL THERAPIST

## 2023-09-08 NOTE — LETTER
9/8/2023         RE: Kareem Bai  [address]  [facility] [address]        Dear Colleague,    Thank you for referring your patient, Kareem Bai, to the [facility] [place]. Please see a copy of my visit note below.    Chief Complaint   Patient presents with    RECHECK     recheck (B) feet / Medicare & BCBS           HPI: Kareem is a 68 year old male who presents today for a diabetic foot exam and management.  He sees Dr. Thaddeus MD, for his diabetic care.  He was last seen on May 5 of this year.  He had a TKR with Dr. Larsen last month.    Review of Systems: No n/v/d/f/c/ns/sob/cp    PMH:   Past Medical History:   Diagnosis Date    Hypertension     Neuropathy     Prediabetes     Sleep apnea        PSxH:   Past Surgical History:   Procedure Laterality Date    OPTICAL TRACKING SYSTEM ARTHROPLASTY KNEE Right 1/4/2018    Procedure: OPTICAL TRACKING SYSTEM ARTHROPLASTY KNEE;  Right Total Knee Arthroplasty;  Surgeon: Marcello Beatty MD;  Location: [facility] OR       Allergies: Ampicillin, Codeine, and Simvastatin    SH:   Social History     Socioeconomic History    Marital status:      Spouse name: Not on file    Number of children: Not on file    Years of education: Not on file    Highest education level: Not on file   Occupational History    Not on file   Tobacco Use    Smoking status: Some Days     Types: Cigars    Smokeless tobacco: Never    Tobacco comments:     5-6/week   Substance and Sexual Activity    Alcohol use: Yes     Comment: Drink every 2-3 days    Drug use: No    Sexual activity: Not on file   Other Topics Concern    Not on file   Social History Narrative    Not on file     Social Determinants of Health     Financial Resource Strain: Not on file   Food Insecurity: Not on file   Transportation Needs: Not on file   Physical Activity: Not on file   Stress: Not on file   Social Connections: Not on file   Intimate Partner Violence: Not on file    Housing Stability: Not on file       FH: No family history on file.    Objective:    PT and DP pulses are 2/4 bilaterally. CRT is instant. Positive pedal hair. +1 pitting edema to left dorsal foot.   Gross sensation is diminished bilaterally.  Protective sensation is diminished as demonstrated with a 5.07G monofilament.  Equinus intact bilaterally. No pain with active or passive ROM of the ankle, MTJ, 1st ray, or halluces bilaterally,. Pes planus with flexor stabilizing hammertoes to lesser digits bilaterally.  A diabetic wound is noted at left  Distal third digit has healed over.    Hyperkeratotic lesion is noted to the bilateral distal halluces.  These are at the tips.  These were sharply debrided today, no open lesions were noted underneath there.  He also has a hyperkeratotic lesion on the distal aspect of the left second digit.  There is intrasubstance bleeding.  This was sharply debrided and no wound was noted underneath.  On the left foot he has lacerations to the second, third, and fourth  nail beds.  On the second nail, there is a deeper laceration to the medial aspect.  There are no signs or symptoms of infection, but it does have serous drainage.    Assessment: Juan Manuel is a 67-year-old type II diabetic with neuropathy.    He does have hyperkeratotic lesions with intrasubstance bleeding noted.  He needs new diabetic shoes to control and get padding to his foot deformity.    Plan:  - Pt seen and evaluated.  -Tyloma was debrided as described on right hallux.  -He can now fill his diabetic shoes.  - Recommend that he use a pumice stone on the calluses, instead of sharps.   -See again in 9 weeks, or sooner if problems arise.         Carmine Santacruz, SAMMIE

## 2023-09-08 NOTE — PROGRESS NOTES
Chief Complaint   Patient presents with    RECHECK     recheck (B) feet / Medicare & BCBS           HPI: Kareem is a 68 year old male who presents today for a diabetic foot exam and management.  He sees Dr. Thaddeus MD, for his diabetic care.  He was last seen on May 5 of this year.  He had a TKR with Dr. Larsen last month.    Review of Systems: No n/v/d/f/c/ns/sob/cp    PMH:   Past Medical History:   Diagnosis Date    Hypertension     Neuropathy     Prediabetes     Sleep apnea        PSxH:   Past Surgical History:   Procedure Laterality Date    OPTICAL TRACKING SYSTEM ARTHROPLASTY KNEE Right 1/4/2018    Procedure: OPTICAL TRACKING SYSTEM ARTHROPLASTY KNEE;  Right Total Knee Arthroplasty;  Surgeon: Marcello Beatty MD;  Location: [facility]       Allergies: Ampicillin, Codeine, and Simvastatin    SH:   Social History     Socioeconomic History    Marital status:      Spouse name: Not on file    Number of children: Not on file    Years of education: Not on file    Highest education level: Not on file   Occupational History    Not on file   Tobacco Use    Smoking status: Some Days     Types: Cigars    Smokeless tobacco: Never    Tobacco comments:     5-6/week   Substance and Sexual Activity    Alcohol use: Yes     Comment: Drink every 2-3 days    Drug use: No    Sexual activity: Not on file   Other Topics Concern    Not on file   Social History Narrative    Not on file     Social Determinants of Health     Financial Resource Strain: Not on file   Food Insecurity: Not on file   Transportation Needs: Not on file   Physical Activity: Not on file   Stress: Not on file   Social Connections: Not on file   Intimate Partner Violence: Not on file   Housing Stability: Not on file       FH: No family history on file.    Objective:    PT and DP pulses are 2/4 bilaterally. CRT is instant. Positive pedal hair. +1 pitting edema to left dorsal foot.   Gross sensation is diminished bilaterally.  Protective sensation is diminished as  demonstrated with a 5.07G monofilament.  Equinus intact bilaterally. No pain with active or passive ROM of the ankle, MTJ, 1st ray, or halluces bilaterally,. Pes planus with flexor stabilizing hammertoes to lesser digits bilaterally.  A diabetic wound is noted at left  Distal third digit has healed over.    Hyperkeratotic lesion is noted to the bilateral distal halluces.  These are at the tips.  These were sharply debrided today, no open lesions were noted underneath there.  He also has a hyperkeratotic lesion on the distal aspect of the left second digit.  There is intrasubstance bleeding.  This was sharply debrided and no wound was noted underneath.  On the left foot he has lacerations to the second, third, and fourth  nail beds.  On the second nail, there is a deeper laceration to the medial aspect.  There are no signs or symptoms of infection, but it does have serous drainage.    Assessment: Juan Manuel is a 67-year-old type II diabetic with neuropathy.    He does have hyperkeratotic lesions with intrasubstance bleeding noted.  He needs new diabetic shoes and custom inserts to control and get padding to his foot deformity.    Plan:  - Pt seen and evaluated.  -Tyloma was debrided as described on right hallux.  -He can now fill his diabetic shoes.  - Recommend that he use a pumice stone on the calluses, instead of sharps.   -See again in 9 weeks, or sooner if problems arise.

## 2023-09-11 ENCOUNTER — THERAPY VISIT (OUTPATIENT)
Dept: PHYSICAL THERAPY | Facility: CLINIC | Age: 68
End: 2023-09-11
Attending: ORTHOPAEDIC SURGERY
Payer: MEDICARE

## 2023-09-11 DIAGNOSIS — M25.562 LEFT KNEE PAIN, UNSPECIFIED CHRONICITY: ICD-10-CM

## 2023-09-11 DIAGNOSIS — Z96.652 S/P TOTAL KNEE ARTHROPLASTY, LEFT: Primary | ICD-10-CM

## 2023-09-11 PROCEDURE — 97140 MANUAL THERAPY 1/> REGIONS: CPT | Mod: GP | Performed by: PHYSICAL THERAPIST

## 2023-09-11 PROCEDURE — 97110 THERAPEUTIC EXERCISES: CPT | Mod: GP | Performed by: PHYSICAL THERAPIST

## 2023-09-13 ENCOUNTER — THERAPY VISIT (OUTPATIENT)
Dept: PHYSICAL THERAPY | Facility: CLINIC | Age: 68
End: 2023-09-13
Attending: ORTHOPAEDIC SURGERY
Payer: MEDICARE

## 2023-09-13 DIAGNOSIS — M25.562 LEFT KNEE PAIN, UNSPECIFIED CHRONICITY: ICD-10-CM

## 2023-09-13 DIAGNOSIS — Z96.652 S/P TOTAL KNEE ARTHROPLASTY, LEFT: Primary | ICD-10-CM

## 2023-09-13 PROCEDURE — 97112 NEUROMUSCULAR REEDUCATION: CPT | Mod: GP | Performed by: PHYSICAL THERAPIST

## 2023-09-13 PROCEDURE — 97530 THERAPEUTIC ACTIVITIES: CPT | Mod: GP | Performed by: PHYSICAL THERAPIST

## 2023-09-13 PROCEDURE — 97110 THERAPEUTIC EXERCISES: CPT | Mod: GP | Performed by: PHYSICAL THERAPIST

## 2023-09-18 ENCOUNTER — THERAPY VISIT (OUTPATIENT)
Dept: PHYSICAL THERAPY | Facility: CLINIC | Age: 68
End: 2023-09-18
Payer: MEDICARE

## 2023-09-18 ENCOUNTER — TELEPHONE (OUTPATIENT)
Dept: WOUND CARE | Facility: CLINIC | Age: 68
End: 2023-09-18

## 2023-09-18 DIAGNOSIS — M25.562 LEFT KNEE PAIN, UNSPECIFIED CHRONICITY: ICD-10-CM

## 2023-09-18 DIAGNOSIS — Z96.652 S/P TOTAL KNEE ARTHROPLASTY, LEFT: Primary | ICD-10-CM

## 2023-09-18 PROCEDURE — 97140 MANUAL THERAPY 1/> REGIONS: CPT | Mod: GP

## 2023-09-18 PROCEDURE — 97110 THERAPEUTIC EXERCISES: CPT | Mod: GP

## 2023-09-18 NOTE — TELEPHONE ENCOUNTER
" [facility]    Phone Message    May a detailed message be left on voicemail: yes     Reason for Call: Other: They're looking for an addendum on the 09/08 appointment notes to include the words \"Custom Inserts\".  For insurance purposes they have to have those two words added to the original clinic notes and they can not me hand written. Please resolve asap as they are unable to help the patient until this addendum is made. Please fax to: [phone]  Cheli can be reached at [phone] for questions.      Action Taken: Message routed to:  [facility] ([facility] Ortho     Travel Screening: Not Applicable                                                                   "

## 2023-09-25 ENCOUNTER — THERAPY VISIT (OUTPATIENT)
Dept: PHYSICAL THERAPY | Facility: CLINIC | Age: 68
End: 2023-09-25
Payer: MEDICARE

## 2023-09-25 DIAGNOSIS — Z96.652 S/P TOTAL KNEE ARTHROPLASTY, LEFT: Primary | ICD-10-CM

## 2023-09-25 DIAGNOSIS — M25.562 LEFT KNEE PAIN, UNSPECIFIED CHRONICITY: ICD-10-CM

## 2023-09-25 PROCEDURE — 97112 NEUROMUSCULAR REEDUCATION: CPT | Mod: GP | Performed by: PHYSICAL THERAPIST

## 2023-09-25 PROCEDURE — 97530 THERAPEUTIC ACTIVITIES: CPT | Mod: GP | Performed by: PHYSICAL THERAPIST

## 2023-09-25 PROCEDURE — 97110 THERAPEUTIC EXERCISES: CPT | Mod: GP | Performed by: PHYSICAL THERAPIST

## 2023-09-25 ASSESSMENT — ACTIVITIES OF DAILY LIVING (ADL)
KNEE_ACTIVITY_OF_DAILY_LIVING_SUM: 42
RISE FROM A CHAIR: ACTIVITY IS FAIRLY DIFFICULT
HOW_WOULD_YOU_RATE_THE_OVERALL_FUNCTION_OF_YOUR_KNEE_DURING_YOUR_USUAL_DAILY_ACTIVITIES?: NEARLY NORMAL
GO UP STAIRS: ACTIVITY IS FAIRLY DIFFICULT
AS_A_RESULT_OF_YOUR_KNEE_INJURY,_HOW_WOULD_YOU_RATE_YOUR_CURRENT_LEVEL_OF_DAILY_ACTIVITY?: ABNORMAL
LIMPING: I HAVE THE SYMPTOM BUT IT DOES NOT AFFECT MY ACTIVITY
WALK: ACTIVITY IS NOT DIFFICULT
SQUAT: ACTIVITY IS FAIRLY DIFFICULT
STAND: ACTIVITY IS NOT DIFFICULT
PAIN: THE SYMPTOM AFFECTS MY ACTIVITY SLIGHTLY
GO DOWN STAIRS: ACTIVITY IS FAIRLY DIFFICULT
STIFFNESS: THE SYMPTOM AFFECTS MY ACTIVITY MODERATELY
SIT WITH YOUR KNEE BENT: ACTIVITY IS NOT DIFFICULT
HOW_WOULD_YOU_RATE_THE_CURRENT_FUNCTION_OF_YOUR_KNEE_DURING_YOUR_USUAL_DAILY_ACTIVITIES_ON_A_SCALE_FROM_0_TO_100_WITH_100_BEING_YOUR_LEVEL_OF_KNEE_FUNCTION_PRIOR_TO_YOUR_INJURY_AND_0_BEING_THE_INABILITY_TO_PERFORM_ANY_OF_YOUR_USUAL_DAILY_ACTIVITIES?: 75
RAW_SCORE: 42
KNEE_ACTIVITY_OF_DAILY_LIVING_SCORE: 60
WEAKNESS: THE SYMPTOM AFFECTS MY ACTIVITY MODERATELY
GIVING WAY, BUCKLING OR SHIFTING OF KNEE: I DO NOT HAVE THE SYMPTOM
KNEEL ON THE FRONT OF YOUR KNEE: I AM UNABLE TO DO THE ACTIVITY
SWELLING: THE SYMPTOM AFFECTS MY ACTIVITY SLIGHTLY

## 2023-09-25 NOTE — PROGRESS NOTES
09/25/23 0500   Appointment Info   Signing clinician's name / credentials hannah luna PT   Total/Authorized Visits 12 (PT)   Visits Used 9   Medical Diagnosis Left knee pain, unspecified chronicity  S/P total knee arthroplasty, left   PT Tx Diagnosis S/P total knee arthroplasty, left   Precautions/Limitations print PTRX   Other pertinent information had R TKA 5 years ago   Quick Adds Certification   Progress Note/Certification   Start of Care Date 08/21/23   Onset of illness/injury or Date of Surgery 08/17/23   Therapy Frequency 2x/week for 2 weeks, 1x/ week for 8 weeks   Predicted Duration 10 weeks   Certification date from 08/21/23   Certification date to 10/30/23   Progress Note Due Date 09/25/23   Progress Note Completed Date 08/21/23       Present No   PT Goal 1   Goal Identifier LTG 1   Goal Description Patient will ambulate without AD on even terrain  utilizing appropriate gait mechanics without compensation for 20 min with PL 3/10 or less   Rationale to maximize safety and independence with performance of ADLs and functional tasks;to maximize safety and independence within the community   Goal Progress walking 40 min with good knee flexion, working on knee extension with stance   Target Date 10/30/23   Subjective Report   Subjective Report I am about 75% of my normal , pl 5/10. possibly the limiting factor , I still have neuropathy in my feet   Objective Measures   Objective Measures Objective Measure 1;Objective Measure 2   Objective Measure 1   Objective Measure ROM   Details 0-0-122   Objective Measure 2   Objective Measure balance left leg   Details 25 sec.   PT Modalities   PT Modalities Cryotherapy   Cryotherapy   Treatment Detail supine on L knee at end of session   Ice -Type Pack;HEP   Location left knee   Treatment Interventions (PT)   Interventions Therapeutic Procedure/Exercise;Therapeutic Activity;Manual Therapy;Neuromuscular Re-education   Therapeutic  Procedure/Exercise   Therapeutic Procedures: strength, endurance, ROM, flexibillity minutes (01818) 14   Therapeutic Procedures Ther Proc 2;Ther Proc 3;Ther Proc 4;Ther Proc 5;Ther Proc 6;Ther Proc 7;Ther Proc 8;Ther Proc 9   Ther Proc 1 shortarc quad in supine   Ther Proc 1 - Details HEP using for just motion over the bolster   Ther Proc 2 assist with ROM   Ther Proc 2 - Details not today sitting, supine, SAQ   Ther Proc 3 bike #8   Ther Proc 3 - Details 5 min   Ther Proc 4 LAQ sitting   Ther Proc 5 leg press for ROM seat #2   Ther Proc 5 - Details 4 1/2 plates bilateral up and down x 10, 3 plates eccentrically x 15   Ther Proc 6 functional knee   Ther Proc 6 - Details 15 xc blue cueing forcontrol   Ther Proc 7 calf stretch standing   Ther Proc 7 - Details 2 x 30 sec   Ther Proc 8 hamstring with foot stool   Ther Proc 8 - Details REV 2x 30 sec   Ther Proc 9 heel slide   Ther Proc 9 - Details 15 x ag   PTRx Ther Proc 1 Ankle Pumps in Long Sitting   PTRx Ther Proc 1 - Details x 10 with ankle elevated    PTRx Ther Proc 2 Seated Heel Slide with Foot on the Floor   PTRx Ther Proc 2 - Details x 10 with 10 sec hold; x 3 min with feet in air and gravity assist to knee flexion; occasional knee ext into flexion to prevent stiffness.    PTRx Ther Proc 3 Stair Knee Flexion Stretch   PTRx Ther Proc 3 - Details rev   PTRx Ther Proc 4 Heel Slides   PTRx Ther Proc 4 - Details rev   PTRx Ther Proc 5 Passive Knee Extension Stretch   PTRx Ther Proc 5 - Details 2 x 30 sec   PTRx Ther Proc 6 Short Arc Knee Extension (Long Sitting)   PTRx Ther Proc 6 - Details rev   PTRx Ther Proc 7 Hip Flexion Straight Leg Raise   PTRx Ther Proc 7 - Details x 5 with therapist assist at ankle - unable to lift LE AG   PTRx Ther Proc 8 Standing Hamstring Curl Knee Flexion   PTRx Ther Proc 8 - Details rev   PTRx Ther Proc 9 Towel Stretch Gastroc   PTRx Ther Proc 9 - Details Not today   PTRx Ther Proc 10 Short Arc Knee Extension   PTRx Ther Proc 10 -  "Details x10 with TKE   PTRx Ther Proc 11 Standing Gastroc Stretch   PTRx Ther Proc 11 - Details Not today   PTRx Ther Proc 12 Standing Hamstring Stretch   PTRx Ther Proc 12 - Details 2x30\"   PTRx Ther Proc 13 Hip Abduction Straight Leg Raise   PTRx Ther Proc 13 - Details x10B   PTRx Ther Proc 14 Hip Extension Straight Leg Raise   PTRx Ther Proc 14 - Details x10B   PTRx Ther Proc 15 Functional Knee Extension with Tubing   PTRx Ther Proc 15 - Details 20 x blue   PTRx Ther Proc 16 Bridging #1   PTRx Ther Proc 16 - Details for hamstring engagement   PTRx Ther Proc 17 Seated Hamstring Curl with Tubing   PTRx Ther Proc 17 - Details x15 B with BTB   PTRx Ther Proc 18 Seated Knee Extension With Theraband   PTRx Ther Proc 18 - Details x10 with RTB - partial ROM d/t weakness.   Skilled Intervention Continued work on extension control and expanded hamstring and flexion control.   Patient Response/Progress increase in control   Therapeutic Activity   Therapeutic Activities: dynamic activities to improve functional performance minutes (24934) 12   Therapeutic Activities Ther Act 2;Ther Act 3   Ther Act 1 step up/down 4/5/6inch step, watching knee and hip control   Ther Act 1 - Details 4 inch x 5 with cueing and instruction  for control and knee movement. pt has pain going up   Ther Act 2 knee bends kitchen sink   Ther Act 2 - Details x 10  progression to more weight on left leg   Ther Act 3 educating about using crutches when walking further disances, both for safety and   PTRx Ther Act 1 Knee Bends   PTRx Ther Act 1 - Details No Notes   PTRx Ther Act 2 Stepdown Backward   PTRx Ther Act 2 - Details No Notes   PTRx Ther Act 3 Stepdown Forward   PTRx Ther Act 3 - Details No Notes   Skilled Intervention progression with step cueing for knee placement and activation of quad and gluteal, less puching with arm   Patient Response/Progress increase in ability to step   Neuromuscular Re-education   Neuromuscular re-ed of mvmt, balance, " coord, kinesthetic sense, posture, proprioception minutes (11837) 14   Neuromuscular Re-education Neuro Re-ed 2   Neuro Re-ed 1 walking without cane, forward, backwrds, sidewyas   Neuro Re-ed 1 - Details walking with instruction on heel swing and heel strike   Neuro Re-ed 2 single leg balance at counter, attempting to turn head   Neuro Re-ed 2 - Details 20 seconds x3 No support needed.   Skilled Intervention walking improving, working on the baalnce   Patient Response/Progress increase in balance activity   Manual Therapy   Manual Therapy Manual Therapy 2   Manual Therapy 1 STM:  L knee   Manual Therapy 1 - Details upward stroking with L LE elevated to decrease inflammation. swelling   Manual Therapy 2 Patellar mob   Manual Therapy 2 - Details as tolerated   Skilled Intervention manual work on swelling, scar and patella   Patient Response/Progress handle treatment well   Education   Learner/Method Patient;Listening;No Barriers to Learning   Education Comments education on goals for initial rehab inclduing inflammation control, pain mangement and ROM; education on inflammation using ice, elevating LLE, and ankle pumps frequently; education on HEP frequency and importance of HEP for improved results   Plan   Home program increase in ROM< strength   Updates to plan of care strengthening, balance and function.   Plan for next session working on pain   Total Session Time   Timed Code Treatment Minutes 40   Total Treatment Time (sum of timed and untimed services) 40       PLAN  Patient continues to make steady improvement with ROM, strength and balance.  Patient continues to have a general ache 3-5/10 that interferes with progress.  Plan to continue to follow 1x/ week then every other week for increase in function, and strength.  Thank you for this referral    Beginning/End Dates of Progress Note Reporting Period:  08/21/23 to 09/25/2023    Referring Provider:  González Larsen

## 2023-10-02 ENCOUNTER — THERAPY VISIT (OUTPATIENT)
Dept: PHYSICAL THERAPY | Facility: CLINIC | Age: 68
End: 2023-10-02
Payer: MEDICARE

## 2023-10-02 DIAGNOSIS — Z96.652 S/P TOTAL KNEE ARTHROPLASTY, LEFT: Primary | ICD-10-CM

## 2023-10-02 DIAGNOSIS — M25.562 LEFT KNEE PAIN, UNSPECIFIED CHRONICITY: ICD-10-CM

## 2023-10-02 PROCEDURE — 97110 THERAPEUTIC EXERCISES: CPT | Mod: GP | Performed by: PHYSICAL THERAPIST

## 2023-10-02 PROCEDURE — 97140 MANUAL THERAPY 1/> REGIONS: CPT | Mod: GP | Performed by: PHYSICAL THERAPIST

## 2023-10-02 PROCEDURE — 97530 THERAPEUTIC ACTIVITIES: CPT | Mod: GP | Performed by: PHYSICAL THERAPIST

## 2023-10-04 ENCOUNTER — MEDICAL CORRESPONDENCE (OUTPATIENT)
Dept: ORTHOPEDICS | Facility: CLINIC | Age: 68
End: 2023-10-04
Payer: MEDICARE

## 2023-10-07 ENCOUNTER — HEALTH MAINTENANCE LETTER (OUTPATIENT)
Age: 68
End: 2023-10-07

## 2023-10-09 ENCOUNTER — THERAPY VISIT (OUTPATIENT)
Dept: PHYSICAL THERAPY | Facility: CLINIC | Age: 68
End: 2023-10-09
Payer: MEDICARE

## 2023-10-09 DIAGNOSIS — Z96.652 S/P TOTAL KNEE ARTHROPLASTY, LEFT: Primary | ICD-10-CM

## 2023-10-09 DIAGNOSIS — M25.562 LEFT KNEE PAIN, UNSPECIFIED CHRONICITY: ICD-10-CM

## 2023-10-09 PROCEDURE — 97140 MANUAL THERAPY 1/> REGIONS: CPT | Mod: GP | Performed by: PHYSICAL THERAPIST

## 2023-10-09 PROCEDURE — 97110 THERAPEUTIC EXERCISES: CPT | Mod: GP | Performed by: PHYSICAL THERAPIST

## 2023-10-16 ENCOUNTER — THERAPY VISIT (OUTPATIENT)
Dept: PHYSICAL THERAPY | Facility: CLINIC | Age: 68
End: 2023-10-16
Payer: MEDICARE

## 2023-10-16 DIAGNOSIS — Z96.652 S/P TOTAL KNEE ARTHROPLASTY, LEFT: Primary | ICD-10-CM

## 2023-10-16 DIAGNOSIS — M25.562 LEFT KNEE PAIN, UNSPECIFIED CHRONICITY: ICD-10-CM

## 2023-10-16 PROCEDURE — 97110 THERAPEUTIC EXERCISES: CPT | Mod: GP | Performed by: PHYSICAL THERAPIST

## 2023-10-16 PROCEDURE — 97530 THERAPEUTIC ACTIVITIES: CPT | Mod: GP | Performed by: PHYSICAL THERAPIST

## 2023-10-16 PROCEDURE — 97140 MANUAL THERAPY 1/> REGIONS: CPT | Mod: GP | Performed by: PHYSICAL THERAPIST

## 2023-10-16 NOTE — PROGRESS NOTES
10/16/23 0500   Appointment Info   Signing clinician's name / credentials Cristina egan PTR   Total/Authorized Visits 12 (PT) +3   Visits Used 12   Medical Diagnosis Left knee pain, unspecified chronicity  S/P total knee arthroplasty, left   PT Tx Diagnosis S/P total knee arthroplasty, left   Precautions/Limitations print PTRX   Other pertinent information had R TKA 5 years ago   Quick Adds Certification   Progress Note/Certification   Start of Care Date 08/21/23   Onset of illness/injury or Date of Surgery 08/17/23   Therapy Frequency 2x/week for 2 weeks, 1x/ week for 8 weeks   Predicted Duration 10 weeks   Certification date from 10/31/23   Certification date to 11/29/23   Progress Note Due Date 10/16/23   Progress Note Completed Date 09/25/23       Present No   PT Goal 1   Goal Identifier LTG 1   Goal Description Patient will ambulate without AD on even terrain  utilizing appropriate gait mechanics without compensation for 20 min with PL 3/10 or less   Rationale to maximize safety and independence with performance of ADLs and functional tasks;to maximize safety and independence within the community   Goal Progress walking 40 min with good knee flexion, working on knee extension with stance, pl 3/10  (still needs work on uneven ground)   Target Date 11/29/23   Subjective Report   Subjective Report I am about 85% of my normal..  I still the ache 3-4/10. constant .   Objective Measures   Objective Measures Objective Measure 1;Objective Measure 2   Objective Measure 1   Objective Measure ROM   Details 0-0-127   Objective Measure 2   Objective Measure balance left leg   Details 30 sec   PT Modalities   PT Modalities Cryotherapy   Cryotherapy   Treatment Detail home today for the ice   Ice -Type Pack;HEP   Location left knee   Treatment Interventions (PT)   Interventions Therapeutic Procedure/Exercise;Therapeutic Activity;Manual Therapy;Neuromuscular Re-education   Therapeutic  "Procedure/Exercise   Therapeutic Procedures: strength, endurance, ROM, flexibillity minutes (63436) 15   Therapeutic Procedures Ther Proc 2;Ther Proc 3;Ther Proc 4;Ther Proc 5;Ther Proc 6;Ther Proc 7;Ther Proc 8;Ther Proc 9   Ther Proc 1 shortarc quad in supine   Ther Proc 1 - Details HEP using for just motion over the bolster   Ther Proc 2 assist with ROM   Ther Proc 3 bike #8   Ther Proc 3 - Details 5 min   Ther Proc 5 leg press for ROM seat #2   Ther Proc 5 - Details 5 plates, 3x10, 4 plates SL x 15 watching knee position, use of ball if needed   Ther Proc 6 functional knee   Ther Proc 6 - Details at [employer] 15 xc blue cueing forcontrol   Ther Proc 7 calf stretch standing   Ther Proc 7 - Details 2 x 30 sec   Ther Proc 8 hamstring with foot stool   Ther Proc 8 - Details REV 2x 30 sec   PTRx Ther Proc 3 Stair Knee Flexion Stretch   PTRx Ther Proc 3 - Details rev   PTRx Ther Proc 5 Passive Knee Extension Stretch   PTRx Ther Proc 5 - Details 2 x 30 sec   PTRx Ther Proc 6 Short Arc Knee Extension (Long Sitting)   PTRx Ther Proc 6 - Details rev   PTRx Ther Proc 7 Hip Flexion Straight Leg Raise   PTRx Ther Proc 7 - Details x 5 with therapist assist at ankle - unable to lift LE AG   PTRx Ther Proc 8 Standing Hamstring Curl Knee Flexion   PTRx Ther Proc 8 - Details rev   PTRx Ther Proc 9 Towel Stretch Gastroc   PTRx Ther Proc 9 - Details Not today   PTRx Ther Proc 10 Short Arc Knee Extension   PTRx Ther Proc 10 - Details x10 with TKE   PTRx Ther Proc 11 Standing Gastroc Stretch   PTRx Ther Proc 11 - Details Not today   PTRx Ther Proc 12 Standing Hamstring Stretch   PTRx Ther Proc 12 - Details 2x30\"   PTRx Ther Proc 13 Hip Abduction Straight Leg Raise   PTRx Ther Proc 13 - Details HEP x10B 2#   PTRx Ther Proc 14 Hip Extension Straight Leg Raise   PTRx Ther Proc 14 - Details HEP x10B 2#   PTRx Ther Proc 15 Functional Knee Extension with Tubing   PTRx Ther Proc 15 - Details HEP 20 x blue   PTRx Ther Proc 16 Bridging #1 " "  PTRx Ther Proc 16 - Details holding the bridge and moving knees in and out x 5   PTRx Ther Proc 17 Seated Hamstring Curl with Tubing   PTRx Ther Proc 17 - Details HEP x15 B with BTB   PTRx Ther Proc 18 Seated Knee Extension With Theraband   PTRx Ther Proc 18 - Details HEP x10 with RTB - partial ROM d/t weakness.   Skilled Intervention working on knee control with increase in weight   Patient Response/Progress increase in ROM and VMO still delayed   Therapeutic Activity   Therapeutic Activities: dynamic activities to improve functional performance minutes (70263) 10   Therapeutic Activities Ther Act 2;Ther Act 3   Ther Act 1 step up/down 4/5/6inch step, watching knee and hip control   Ther Act 1 - Details watching knee control and hip avoiding lateral movememtn   Ther Act 2 knee bends kitchen sink   Ther Act 2 - Details x 10  progression to more weight on left leg   PTRx Ther Act 1 Knee Bends   PTRx Ther Act 1 - Details 6 \" - geting tired today   PTRx Ther Act 3 - Details \   Skilled Intervention progression with step cueing for knee placement and activation of quad and gluteal, lno use of arm activity   Patient Response/Progress watching L/E alignment with activity   Neuromuscular Re-education   Neuromuscular re-ed of mvmt, balance, coord, kinesthetic sense, posture, proprioception minutes (65672) 5   Neuromuscular Re-education Neuro Re-ed 2   Neuro Re-ed 1 walking without cane, forward, backwrds, sidewyas   Neuro Re-ed 1 - Details walking with instruction on heel swing and heel strike   Neuro Re-ed 2 single leg balance at counter, attempting to turn head   Neuro Re-ed 2 - Details 20 seconds x3 No support needed.   Skilled Intervention walking improving, working on the Omniataalnce   Patient Response/Progress increase in balance activity   Manual Therapy   Manual Therapy: Mobilization, MFR, MLD, friction massage minutes (49743) 9   Manual Therapy Manual Therapy 2   Manual Therapy 1 STM:  L knee   Manual Therapy 1 - " Details upward stroking with L LE elevated to decrease inflammation. swelling   Manual Therapy 2 Patellar mob   Manual Therapy 2 - Details superior glide, medial glide   Skilled Intervention manual work on swelling, scar and patella   Patient Response/Progress increase in patella movement superior   Education   Learner/Method Patient;Listening;No Barriers to Learning   Education Comments education on goals for initial rehab inclduing inflammation control, pain mangement and ROM; education on inflammation using ice, elevating LLE, and ankle pumps frequently; education on HEP frequency and importance of HEP for improved results   Plan   Home program increase in ROM< strength   Updates to plan of care higher level functional activity   Plan for next session balance   Total Session Time   Timed Code Treatment Minutes 39   Total Treatment Time (sum of timed and untimed services) 39     Patient making steady progression.  Working on high level functional activity such as stairs, squatting and walking.  Plan to see 1x/ every other week for 6 more weeks.  Thank you for this referral .      [facility]                                                                                   OUTPATIENT PHYSICAL THERAPY    PLAN OF TREATMENT FOR OUTPATIENT REHABILITATION   Patient's Last Name, First Name, Kareem Coleman YOB: 1955   Provider's Name   [facility]   Medical Record No.  [id number]     Onset Date: 08/17/23  Start of Care Date: 08/21/23     Medical Diagnosis:  Left knee pain, unspecified chronicity  S/P total knee arthroplasty, left      PT Treatment Diagnosis:  S/P total knee arthroplasty, left Plan of Treatment  Frequency/Duration: 2x/week for 2 weeks, 1x/ week for 8 weeks/ 10 weeks    Certification date from 10/31/23 to 11/29/23         See note for plan of treatment details and functional goals     Cristina Cobb, PT                          I CERTIFY THE NEED FOR THESE SERVICES FURNISHED UNDER        THIS PLAN OF TREATMENT AND WHILE UNDER MY CARE .             Physician Signature               Date    X_____________________________________________________                    Referring Provider:  González Larsen      Initial Assessment  See Epic Evaluation- Start of Care Date: 08/21/23

## 2023-12-04 ENCOUNTER — OFFICE VISIT (OUTPATIENT)
Dept: ORTHOPEDICS | Facility: CLINIC | Age: 68
End: 2023-12-04
Payer: MEDICARE

## 2023-12-04 DIAGNOSIS — E11.49 TYPE II OR UNSPECIFIED TYPE DIABETES MELLITUS WITH NEUROLOGICAL MANIFESTATIONS, NOT STATED AS UNCONTROLLED(250.60) (H): Primary | ICD-10-CM

## 2023-12-04 DIAGNOSIS — B07.0 VERRUCA PLANTARIS: ICD-10-CM

## 2023-12-04 DIAGNOSIS — L84 TYLOMA: ICD-10-CM

## 2023-12-04 PROCEDURE — 11056 PARNG/CUTG B9 HYPRKR LES 2-4: CPT | Performed by: PODIATRIST

## 2023-12-04 PROCEDURE — 99213 OFFICE O/P EST LOW 20 MIN: CPT | Mod: 25 | Performed by: PODIATRIST

## 2023-12-04 NOTE — PROGRESS NOTES
Chief Complaint   Patient presents with    Follow Up     Diabetic foot care.            HPI: Kareem is a 68 year old male who presents today for a diabetic foot exam and management.  He sees Dr. Thaddeus MD, for his diabetic care.  He was last seen on 7/31 of this year.  He relates that his neuropathy is getting worse.  He has taken gabapentin, but he did not want to try this again, as it did not do much to control his neuropathic symptoms.  He notes he has some new pain in the left heel.  This been for a few weeks.  He is not sure if he has a sliver in the area.    Review of Systems: No n/v/d/f/c/ns/sob/cp    PMH:   Past Medical History:   Diagnosis Date    Hypertension     Neuropathy     Prediabetes     Sleep apnea        PSxH:   Past Surgical History:   Procedure Laterality Date    OPTICAL TRACKING SYSTEM ARTHROPLASTY KNEE Right 1/4/2018    Procedure: OPTICAL TRACKING SYSTEM ARTHROPLASTY KNEE;  Right Total Knee Arthroplasty;  Surgeon: Marcello Beatty MD;  Location: [facility] OR       Allergies: Ampicillin, Codeine, and Simvastatin    SH:   Social History     Socioeconomic History    Marital status:      Spouse name: Not on file    Number of children: Not on file    Years of education: Not on file    Highest education level: Not on file   Occupational History    Not on file   Tobacco Use    Smoking status: Some Days     Types: Cigars    Smokeless tobacco: Never    Tobacco comments:     5-6/week   Substance and Sexual Activity    Alcohol use: Yes     Comment: Drink every 2-3 days    Drug use: No    Sexual activity: Not on file   Other Topics Concern    Not on file   Social History Narrative    Not on file     Social Determinants of Health     Financial Resource Strain: Not on file   Food Insecurity: Not on file   Transportation Needs: Not on file   Physical Activity: Not on file   Stress: Not on file   Social Connections: Not on file   Interpersonal Safety: Not on file   Housing Stability: Not on file       FH:  No family history on file.    Objective:    PT and DP pulses are 2/4 bilaterally. CRT is instant. Positive pedal hair. +1 pitting edema to left dorsal foot.   Gross sensation is diminished bilaterally.  Protective sensation is diminished as demonstrated with a 5.07G monofilament.  Equinus intact bilaterally. No pain with active or passive ROM of the ankle, MTJ, 1st ray, or halluces bilaterally,.   A diabetic wound is noted at left  Distal third digit has healed over.    Hyperkeratotic lesion is noted to the bilateral distal halluces.  These are at the tips.  These were sharply debrided today, no open lesions were noted underneath there.  There is intrasubstance bleeding.  This was sharply debrided and no wound was noted underneath.  Lacerations noted to bilateral feet.  He has an area on the left plantar heel that is pinpoint bleeding, pain with lateral compression, and loss of skin tension lines.    Assessment: Juan Manuel is a 67-year-old type II diabetic with neuropathy and osteomyelitis of the left third digit.    He does have hyperkeratotic lesions with intrasubstance bleeding noted.  He has self debrided his nails.  He also has a new verruca on the plantar left heel.    Plan:  - Pt seen and evaluated.  -Tyloma was debrided as described on bilateral halluces.  - Recommend that he use a pumice stone on the calluses, instead of sharps.   -I did debride the tissue around the verruca.  He is severely neuropathic, and I would not use liquid nitrogen on this, as it can cause a deeper wound.  Recommend he just watch the area for now.  Most workers are self-limiting.  -See again in 9 weeks, or sooner if problems arise.

## 2023-12-04 NOTE — LETTER
12/4/2023         RE: Kareem Bai  [address]  [facility] [address]        Dear Colleague,    Thank you for referring your patient, Kareem Bai, to the [facility] [place]. Please see a copy of my visit note below.    Chief Complaint   Patient presents with    Follow Up     Diabetic foot care.            HPI: Kareem is a 68 year old male who presents today for a diabetic foot exam and management.  He sees Dr. Thaddeus MD, for his diabetic care.  He was last seen on 7/31 of this year.  He relates that his neuropathy is getting worse.  He has taken gabapentin, but he did not want to try this again, as it did not do much to control his neuropathic symptoms.  He notes he has some new pain in the left heel.  This been for a few weeks.  He is not sure if he has a sliver in the area.    Review of Systems: No n/v/d/f/c/ns/sob/cp    PMH:   Past Medical History:   Diagnosis Date    Hypertension     Neuropathy     Prediabetes     Sleep apnea        PSxH:   Past Surgical History:   Procedure Laterality Date    OPTICAL TRACKING SYSTEM ARTHROPLASTY KNEE Right 1/4/2018    Procedure: OPTICAL TRACKING SYSTEM ARTHROPLASTY KNEE;  Right Total Knee Arthroplasty;  Surgeon: Marcello Beatty MD;  Location: [facility]       Allergies: Ampicillin, Codeine, and Simvastatin    SH:   Social History     Socioeconomic History    Marital status:      Spouse name: Not on file    Number of children: Not on file    Years of education: Not on file    Highest education level: Not on file   Occupational History    Not on file   Tobacco Use    Smoking status: Some Days     Types: Cigars    Smokeless tobacco: Never    Tobacco comments:     5-6/week   Substance and Sexual Activity    Alcohol use: Yes     Comment: Drink every 2-3 days    Drug use: No    Sexual activity: Not on file   Other Topics Concern    Not on file   Social History Narrative    Not on file     Social Determinants of Health      Financial Resource Strain: Not on file   Food Insecurity: Not on file   Transportation Needs: Not on file   Physical Activity: Not on file   Stress: Not on file   Social Connections: Not on file   Interpersonal Safety: Not on file   Housing Stability: Not on file       FH: No family history on file.    Objective:    PT and DP pulses are 2/4 bilaterally. CRT is instant. Positive pedal hair. +1 pitting edema to left dorsal foot.   Gross sensation is diminished bilaterally.  Protective sensation is diminished as demonstrated with a 5.07G monofilament.  Equinus intact bilaterally. No pain with active or passive ROM of the ankle, MTJ, 1st ray, or halluces bilaterally,.   A diabetic wound is noted at left  Distal third digit has healed over.    Hyperkeratotic lesion is noted to the bilateral distal halluces.  These are at the tips.  These were sharply debrided today, no open lesions were noted underneath there.  There is intrasubstance bleeding.  This was sharply debrided and no wound was noted underneath.  Lacerations noted to bilateral feet.  He has an area on the left plantar heel that is pinpoint bleeding, pain with lateral compression, and loss of skin tension lines.    Assessment: Juan Manuel is a 67-year-old type II diabetic with neuropathy and osteomyelitis of the left third digit.    He does have hyperkeratotic lesions with intrasubstance bleeding noted.  He has self debrided his nails.  He also has a new verruca on the plantar left heel.    Plan:  - Pt seen and evaluated.  -Tyloma was debrided as described on bilateral halluces.  - Recommend that he use a pumice stone on the calluses, instead of sharps.   -I did debride the tissue around the verruca.  He is severely neuropathic, and I would not use liquid nitrogen on this, as it can cause a deeper wound.  Recommend he just watch the area for now.  Most workers are self-limiting.  -See again in 9 weeks, or sooner if problems arise.           Carmine Santacruz, SAMMIE

## 2024-01-02 ENCOUNTER — APPOINTMENT (OUTPATIENT)
Dept: GENERAL RADIOLOGY | Facility: CLINIC | Age: 69
End: 2024-01-02
Attending: PHYSICIAN ASSISTANT
Payer: MEDICARE

## 2024-01-02 ENCOUNTER — HOSPITAL ENCOUNTER (EMERGENCY)
Facility: CLINIC | Age: 69
Discharge: HOME | End: 2024-01-02
Attending: EMERGENCY MEDICINE | Admitting: EMERGENCY MEDICINE
Payer: MEDICARE

## 2024-01-02 VITALS
DIASTOLIC BLOOD PRESSURE: 81 MMHG | TEMPERATURE: 97.4 F | OXYGEN SATURATION: 100 % | RESPIRATION RATE: 16 BRPM | SYSTOLIC BLOOD PRESSURE: 166 MMHG | HEART RATE: 79 BPM

## 2024-01-02 DIAGNOSIS — R07.89 CHEST WALL PAIN: ICD-10-CM

## 2024-01-02 PROCEDURE — 71101 X-RAY EXAM UNILAT RIBS/CHEST: CPT | Mod: LT

## 2024-01-02 PROCEDURE — 99283 EMERGENCY DEPT VISIT LOW MDM: CPT | Performed by: EMERGENCY MEDICINE

## 2024-01-02 PROCEDURE — 99283 EMERGENCY DEPT VISIT LOW MDM: CPT | Mod: FS | Performed by: EMERGENCY MEDICINE

## 2024-01-02 RX ORDER — IBUPROFEN 600 MG/1
600 TABLET, FILM COATED ORAL EVERY 6 HOURS PRN
Qty: 20 TABLET | Refills: 0 | Status: SHIPPED | OUTPATIENT
Start: 2024-01-02

## 2024-01-02 ASSESSMENT — ACTIVITIES OF DAILY LIVING (ADL): ADLS_ACUITY_SCORE: 35

## 2024-01-02 NOTE — DISCHARGE INSTRUCTIONS
You may use topical lidocaine patches as needed for pain.  May take ibuprofen as prescribed.  Follow-up with primary doctor as needed.  Return to ER for worsening symptoms.

## 2024-01-02 NOTE — ED PROVIDER NOTES
ED Provider Note  [facility]      History     Chief Complaint   Patient presents with    Rib Pain     Onset 3 days ago, slipped on ice, now having increasing left sided rib pain, unable to sleep.     HPI  67yo M pmhx DM2 p/w left chest wall pain s/p mechanical fall 3 days ago.  Patient reports slipping on ice, falling onto his left side.  Denies head strike, LOC, neck pain, AC use.  Has been ambulatory since then.  However, has had left lateral chest wall pain, that is worse with palpation, laying on his side to sleep, and with deep inspirations.  He denies overt shortness of breath, fever, cough, abdominal pain, nausea, vomiting.  He has not attempted anything for pain at home.    Past Medical History  Past Medical History:   Diagnosis Date    Hypertension     Neuropathy     Prediabetes     Sleep apnea      Past Surgical History:   Procedure Laterality Date    OPTICAL TRACKING SYSTEM ARTHROPLASTY KNEE Right 1/4/2018    Procedure: OPTICAL TRACKING SYSTEM ARTHROPLASTY KNEE;  Right Total Knee Arthroplasty;  Surgeon: Marcello Beatty MD;  Location: [facility] OR     ibuprofen (ADVIL/MOTRIN) 600 MG tablet  Cadexomer Iodine, topical, 0.9% (IODOSORB) 0.9 % GEL gel  ceFAZolin (ANCEF) intermittent infusion 2 g in 100 mL dextrose PRE-MIX  ciclopirox (LOPROX) 0.77 % cream  clopidogrel (PLAVIX) 75 MG tablet  gabapentin (NEURONTIN) 300 MG capsule  lisinopril-hydrochlorothiazide (PRINZIDE/ZESTORETIC) 20-25 MG per tablet  metFORMIN (GLUCOPHAGE) 1000 MG tablet  naproxen (NAPROSYN) 500 MG tablet  povidone-iodine (BETADINE) 10 % ointment  pravastatin (PRAVACHOL) 10 MG tablet      Allergies   Allergen Reactions    Ampicillin Other (See Comments) and Hives    Codeine GI Disturbance     LW Reaction: gi upset    Simvastatin Other (See Comments)     Family History  No family history on file.  Social History   Social History     Tobacco Use    Smoking status: Some Days     Types: Cigars    Smokeless tobacco: Never     Tobacco comments:     5-6/week   Substance Use Topics    Alcohol use: Yes     Comment: Drink every 2-3 days    Drug use: No         A medically appropriate review of systems was performed with pertinent positives and negatives noted in the HPI, and all other systems negative.    Physical Exam   BP: (!) 166/81  Pulse: 79  Temp: 97.4  F (36.3  C)  Resp: 16  SpO2: 100 %  Physical Exam  Constitutional:       General: He is not in acute distress.     Appearance: He is well-developed. He is not diaphoretic.   HENT:      Head: Normocephalic and atraumatic.      Nose: No congestion.      Mouth/Throat:      Mouth: Mucous membranes are moist.   Eyes:      Conjunctiva/sclera: Conjunctivae normal.   Cardiovascular:      Rate and Rhythm: Normal rate and regular rhythm.   Pulmonary:      Effort: Pulmonary effort is normal.      Breath sounds: Normal breath sounds.   Chest:      Chest wall: Tenderness present. No crepitus.       Abdominal:      General: Abdomen is flat.      Palpations: Abdomen is soft.      Tenderness: There is no abdominal tenderness.   Musculoskeletal:      Cervical back: Normal range of motion and neck supple.   Skin:     General: Skin is warm and dry.      Capillary Refill: Capillary refill takes less than 2 seconds.      Findings: No rash.   Neurological:      Mental Status: He is alert and oriented to person, place, and time.           ED Course, Procedures, & Data      Procedures       Results for orders placed or performed during the hospital encounter of 01/02/24   Ribs XR, unilat 3 views + PA chest,  left     Status: None    Narrative    LEFT RIBS AND CHEST THREE VIEWS  1/2/2024 1:20 PM     INDICATION: Left-sided rib pain after a fall.    COMPARISON: None.      Impression    IMPRESSION: Unremarkable heart and lungs.  No rib fracture.     NATALY MUNIZ MD         SYSTEM ID:  [id number]     Medications - No data to display  Labs Ordered and Resulted from Time of ED Arrival to Time of ED Departure - No  data to display  Ribs XR, unilat 3 views + PA chest,  left   Final Result   IMPRESSION: Unremarkable heart and lungs.  No rib fracture.       NATALY MUNIZ MD            SYSTEM ID:  [id number]             Critical care was not performed.     Medical Decision Making  The patient's presentation was of low complexity (an acute and uncomplicated illness or injury).    The patient's evaluation involved:  review of external note(s) from 1 sources (clinic)  ordering and/or review of 1 test(s) in this encounter (xry)    The patient's management necessitated moderate risk (prescription drug management including medications given in the ED).    Assessment & Plan    69yo M pmhx DM2 p/w left chest wall pain s/p mechanical fall 3 days ago.  No other injuries.  No head strike.  No concern for intracranial bleed.  Ambulatory without difficulty.  On exam HDS/AF, normal SpO2.  Clear lungs.  However, has exquisite TTP over left lateral ribs, without overlying skin changes.  An x-ray was obtained to assess for fractures and negative.  Patient was offered analgesia in the ED, but declined.  Discharged with instructions for topical lidocaine patches, and prescription for ibuprofen.  PCP follow-up and ER return precautions given.    I have reviewed the nursing notes. I have reviewed the findings, diagnosis, plan and need for follow up with the patient.    New Prescriptions    IBUPROFEN (ADVIL/MOTRIN) 600 MG TABLET    Take 1 tablet (600 mg) by mouth every 6 hours as needed for moderate pain       Final diagnoses:   Chest wall pain         Alli Buck PA-C  [facility] EMERGENCY DEPARTMENT  1/2/2024     Alli Buck PA-C  01/02/24 1400    --    ED Attending Physician Attestation    I Viola Husain MD, cared for this patient with the Advanced Practice Provider (DENNIS). I have performed a history and physical examination of the patient independent of the DENNIS. I reviewed the DENNIS's documentation above and agree  with the documented findings and plan of care. I personally provided a substantive portion of the care for this patient, including the complete Medical Decision Making. Please see the DENNIS's documentation for full details.    Summary of HPI, PE, ED Course   Patient is a 68 year old male evaluated in the emergency department for left sided chest wall pain after a fall. Exam and ED course notable for no signs of intraabdominal or retroperitoneal injury on exam. CXR with rib views independently reviewed by me. Shows clear lungs, no hemopneumothorax and no visualized rib fracture. May have rib contusion vs. Chest wall sprain/strain. After the completion of care in the emergency department, the patient was discharged.    Critical Care & Procedures  Not applicable.      Viola Husain MD  Emergency Medicine          Viola Husain MD  01/03/24 1024

## 2024-02-19 ENCOUNTER — OFFICE VISIT (OUTPATIENT)
Dept: ORTHOPEDICS | Facility: CLINIC | Age: 69
End: 2024-02-19
Payer: MEDICARE

## 2024-02-19 DIAGNOSIS — E11.49 TYPE II OR UNSPECIFIED TYPE DIABETES MELLITUS WITH NEUROLOGICAL MANIFESTATIONS, NOT STATED AS UNCONTROLLED(250.60) (H): Primary | ICD-10-CM

## 2024-02-19 DIAGNOSIS — M20.41 HAMMER TOES OF BOTH FEET: ICD-10-CM

## 2024-02-19 DIAGNOSIS — M21.42 PES PLANUS OF BOTH FEET: ICD-10-CM

## 2024-02-19 DIAGNOSIS — L84 TYLOMA: ICD-10-CM

## 2024-02-19 DIAGNOSIS — M20.42 HAMMER TOES OF BOTH FEET: ICD-10-CM

## 2024-02-19 DIAGNOSIS — M21.41 PES PLANUS OF BOTH FEET: ICD-10-CM

## 2024-02-19 PROCEDURE — 11056 PARNG/CUTG B9 HYPRKR LES 2-4: CPT | Performed by: PODIATRIST

## 2024-02-19 PROCEDURE — 99213 OFFICE O/P EST LOW 20 MIN: CPT | Mod: 25 | Performed by: PODIATRIST

## 2024-02-19 NOTE — PROGRESS NOTES
Chief Complaint   Patient presents with    Follow Up     Foot care.              HPI: Kareem is a 68 year old male who presents today for a diabetic foot exam and management.  He sees Dr. Thaddeus MD, for his diabetic care.  He was last seen on 2/2/24.  He relates that his neuropathy is getting worse.  He has taken gabapentin, but he did not want to try this again, as it did not do much to control his neuropathic symptoms.     Review of Systems: No n/v/d/f/c/ns/sob/cp    PMH:   Past Medical History:   Diagnosis Date    Hypertension     Neuropathy     Prediabetes     Sleep apnea        PSxH:   Past Surgical History:   Procedure Laterality Date    OPTICAL TRACKING SYSTEM ARTHROPLASTY KNEE Right 1/4/2018    Procedure: OPTICAL TRACKING SYSTEM ARTHROPLASTY KNEE;  Right Total Knee Arthroplasty;  Surgeon: Marcello Beatty MD;  Location: [facility]       Allergies: Ampicillin, Codeine, and Simvastatin    SH:   Social History     Socioeconomic History    Marital status:      Spouse name: Not on file    Number of children: Not on file    Years of education: Not on file    Highest education level: Not on file   Occupational History    Not on file   Tobacco Use    Smoking status: Some Days     Types: Cigars    Smokeless tobacco: Never    Tobacco comments:     5-6/week   Substance and Sexual Activity    Alcohol use: Yes     Comment: Drink every 2-3 days    Drug use: No    Sexual activity: Not on file   Other Topics Concern    Not on file   Social History Narrative    Not on file     Social Determinants of Health     Financial Resource Strain: Not on file   Food Insecurity: Not on file   Transportation Needs: Not on file   Physical Activity: Not on file   Stress: Not on file   Social Connections: Not on file   Interpersonal Safety: Not on file   Housing Stability: Not on file       FH: No family history on file.    Objective:    PT and DP pulses are 2/4 bilaterally. CRT is instant. Positive pedal hair. +1 pitting edema to  left dorsal foot.   Gross sensation is diminished bilaterally.  Protective sensation is diminished as demonstrated with a 5.07G monofilament.  Equinus intact bilaterally. No pain with active or passive ROM of the ankle, MTJ, 1st ray, or halluces bilaterally,.   A diabetic wound is noted at left  Distal third digit has healed over.    Hyperkeratotic lesion is noted to the bilateral distal halluces.  These are at the tips.  These were sharply debrided today, no open lesions were noted underneath there.  There is intrasubstance bleeding.  This was sharply debrided and no wound was noted underneath.  Lacerations noted to bilateral feet.      Assessment: Juan Manuel is a 68-year-old type II diabetic with neuropathy and osteomyelitis of the left third digit.    He does have hyperkeratotic lesions with intrasubstance bleeding noted.  He has self debrided his nails.      Plan:  - Pt seen and evaluated.  -Tyloma was debrided as described on bilateral halluces.  - Recommend that he use a pumice stone on the calluses, instead of sharps.   -See again in 9 weeks, or sooner if problems arise.

## 2024-02-19 NOTE — LETTER
2/19/2024         RE: Kareem Bai  [address]  [facility] [address]        Dear Colleague,    Thank you for referring your patient, Kareem Bai, to the [facility] [place]. Please see a copy of my visit note below.    Chief Complaint   Patient presents with    Follow Up     Foot care.              HPI: Kareem is a 68 year old male who presents today for a diabetic foot exam and management.  He sees Dr. Thaddeus MD, for his diabetic care.  He was last seen on 2/2/24.  He relates that his neuropathy is getting worse.  He has taken gabapentin, but he did not want to try this again, as it did not do much to control his neuropathic symptoms.     Review of Systems: No n/v/d/f/c/ns/sob/cp    PMH:   Past Medical History:   Diagnosis Date    Hypertension     Neuropathy     Prediabetes     Sleep apnea        PSxH:   Past Surgical History:   Procedure Laterality Date    OPTICAL TRACKING SYSTEM ARTHROPLASTY KNEE Right 1/4/2018    Procedure: OPTICAL TRACKING SYSTEM ARTHROPLASTY KNEE;  Right Total Knee Arthroplasty;  Surgeon: Marcello Beatty MD;  Location:  OR       Allergies: Ampicillin, Codeine, and Simvastatin    SH:   Social History     Socioeconomic History    Marital status:      Spouse name: Not on file    Number of children: Not on file    Years of education: Not on file    Highest education level: Not on file   Occupational History    Not on file   Tobacco Use    Smoking status: Some Days     Types: Cigars    Smokeless tobacco: Never    Tobacco comments:     5-6/week   Substance and Sexual Activity    Alcohol use: Yes     Comment: Drink every 2-3 days    Drug use: No    Sexual activity: Not on file   Other Topics Concern    Not on file   Social History Narrative    Not on file     Social Determinants of Health     Financial Resource Strain: Not on file   Food Insecurity: Not on file   Transportation Needs: Not on file   Physical Activity: Not on file    Stress: Not on file   Social Connections: Not on file   Interpersonal Safety: Not on file   Housing Stability: Not on file       FH: No family history on file.    Objective:    PT and DP pulses are 2/4 bilaterally. CRT is instant. Positive pedal hair. +1 pitting edema to left dorsal foot.   Gross sensation is diminished bilaterally.  Protective sensation is diminished as demonstrated with a 5.07G monofilament.  Equinus intact bilaterally. No pain with active or passive ROM of the ankle, MTJ, 1st ray, or halluces bilaterally,.   A diabetic wound is noted at left  Distal third digit has healed over.    Hyperkeratotic lesion is noted to the bilateral distal halluces.  These are at the tips.  These were sharply debrided today, no open lesions were noted underneath there.  There is intrasubstance bleeding.  This was sharply debrided and no wound was noted underneath.  Lacerations noted to bilateral feet.      Assessment: Juan Manuel is a 68-year-old type II diabetic with neuropathy and osteomyelitis of the left third digit.    He does have hyperkeratotic lesions with intrasubstance bleeding noted.  He has self debrided his nails.      Plan:  - Pt seen and evaluated.  -Tyloma was debrided as described on bilateral halluces.  - Recommend that he use a pumice stone on the calluses, instead of sharps.   -See again in 9 weeks, or sooner if problems arise.        Carmine Santacruz, SAMMIE

## 2024-02-24 ENCOUNTER — HEALTH MAINTENANCE LETTER (OUTPATIENT)
Age: 69
End: 2024-02-24

## 2024-02-27 ENCOUNTER — TELEPHONE (OUTPATIENT)
Dept: WOUND CARE | Facility: CLINIC | Age: 69
End: 2024-02-27
Payer: MEDICARE

## 2024-02-27 NOTE — TELEPHONE ENCOUNTER
Patient is asking for a call back from Dr Santacruz's team regarding an orthotics order being faxed to [facility] at [phone].    Please call patient once this has been faxed at [phone], any time, okay to leave detailed msg.

## 2024-04-02 ENCOUNTER — MEDICAL CORRESPONDENCE (OUTPATIENT)
Dept: HEALTH INFORMATION MANAGEMENT | Facility: CLINIC | Age: 69
End: 2024-04-02
Payer: MEDICARE

## 2024-04-04 ENCOUNTER — TELEPHONE (OUTPATIENT)
Dept: WOUND CARE | Facility: CLINIC | Age: 69
End: 2024-04-04
Payer: MEDICARE

## 2024-04-04 NOTE — TELEPHONE ENCOUNTER
Elias calling to get current prescription order for pt's diabetic shoes. Please fax to [phone]

## 2024-04-23 ENCOUNTER — OFFICE VISIT (OUTPATIENT)
Dept: ORTHOPEDICS | Facility: CLINIC | Age: 69
End: 2024-04-23
Payer: MEDICARE

## 2024-04-23 DIAGNOSIS — L84 TYLOMA: ICD-10-CM

## 2024-04-23 DIAGNOSIS — E11.49 TYPE II OR UNSPECIFIED TYPE DIABETES MELLITUS WITH NEUROLOGICAL MANIFESTATIONS, NOT STATED AS UNCONTROLLED(250.60) (H): Primary | ICD-10-CM

## 2024-04-23 PROCEDURE — 99213 OFFICE O/P EST LOW 20 MIN: CPT | Performed by: PODIATRIST

## 2024-04-23 NOTE — PROGRESS NOTES
Chief Complaint   Patient presents with    Follow Up     9 week follow up.              HPI: Kareem is a 69 year old male who presents today for a diabetic foot exam and management.  He sees Dr. Thaddeus MD, for his diabetic care.  He was last seen on 2/2/24.  No new LE complaints.     Review of Systems: No n/v/d/f/c/ns/sob/cp    PMH:   Past Medical History:   Diagnosis Date    Hypertension     Neuropathy     Prediabetes     Sleep apnea        PSxH:   Past Surgical History:   Procedure Laterality Date    OPTICAL TRACKING SYSTEM ARTHROPLASTY KNEE Right 1/4/2018    Procedure: OPTICAL TRACKING SYSTEM ARTHROPLASTY KNEE;  Right Total Knee Arthroplasty;  Surgeon: Marcello Beatty MD;  Location: [facility]       Allergies: Ampicillin, Codeine, and Simvastatin    SH:   Social History     Socioeconomic History    Marital status:      Spouse name: Not on file    Number of children: Not on file    Years of education: Not on file    Highest education level: Not on file   Occupational History    Not on file   Tobacco Use    Smoking status: Some Days     Types: Cigars    Smokeless tobacco: Never    Tobacco comments:     5-6/week   Substance and Sexual Activity    Alcohol use: Yes     Comment: Drink every 2-3 days    Drug use: No    Sexual activity: Not on file   Other Topics Concern    Not on file   Social History Narrative    Not on file     Social Determinants of Health     Financial Resource Strain: Not on file   Food Insecurity: Not on file   Transportation Needs: Not on file   Physical Activity: Not on file   Stress: Not on file   Social Connections: Not on file   Interpersonal Safety: Not on file   Housing Stability: Not on file       FH: No family history on file.    Objective:    PT and DP pulses are 2/4 bilaterally. CRT is instant. Positive pedal hair. +1 pitting edema to left dorsal foot.   Gross sensation is diminished bilaterally.  Protective sensation is diminished as demonstrated with a 5.07G  monofilament.  Equinus intact bilaterally. No pain with active or passive ROM of the ankle, MTJ, 1st ray, or halluces bilaterally,.   A diabetic wound is noted at left  Distal third digit has healed over.    Hyperkeratotic lesion is noted to the bilateral distal halluces.  These are at the tips.  These were sharply debrided today, no open lesions were noted underneath there.  There is intrasubstance bleeding.  This was sharply debrided and no wound was noted underneath.  Lacerations noted to bilateral feet.      Assessment: Juan Manuel is a 69-year-old type II diabetic with neuropathy and osteomyelitis of the left third digit.    He does have hyperkeratotic lesions with intrasubstance bleeding noted.  He has self debrided his nails.      Plan:  - Pt seen and evaluated.  -Tyloma was debrided as described on bilateral halluces.  - Recommend that he use a pumice stone on the calluses, instead of sharps.   -See again in 9 weeks, or sooner if problems arise.

## 2024-06-21 ENCOUNTER — MEDICAL CORRESPONDENCE (OUTPATIENT)
Dept: HEALTH INFORMATION MANAGEMENT | Facility: CLINIC | Age: 69
End: 2024-06-21
Payer: MEDICARE

## 2024-06-24 ENCOUNTER — DOCUMENTATION ONLY (OUTPATIENT)
Dept: ORTHOPEDICS | Facility: CLINIC | Age: 69
End: 2024-06-24
Payer: MEDICARE

## 2024-06-24 NOTE — PROGRESS NOTES
Received Completed forms Yes   Faxed Forms Faxed To: [facility]  Fax Number: [phone]   Sent to HIM (Date) 6/24/24

## 2024-07-13 ENCOUNTER — HEALTH MAINTENANCE LETTER (OUTPATIENT)
Age: 69
End: 2024-07-13

## 2024-08-27 ENCOUNTER — OFFICE VISIT (OUTPATIENT)
Dept: ORTHOPEDICS | Facility: CLINIC | Age: 69
End: 2024-08-27
Payer: MEDICARE

## 2024-08-27 DIAGNOSIS — E11.49 TYPE II OR UNSPECIFIED TYPE DIABETES MELLITUS WITH NEUROLOGICAL MANIFESTATIONS, NOT STATED AS UNCONTROLLED(250.60) (H): Primary | ICD-10-CM

## 2024-08-27 DIAGNOSIS — L84 TYLOMA: ICD-10-CM

## 2024-08-27 PROCEDURE — 99213 OFFICE O/P EST LOW 20 MIN: CPT | Performed by: PODIATRIST

## 2024-08-27 NOTE — PROGRESS NOTES
Chief Complaint   Patient presents with    Follow Up     Foot check.              HPI: Kareem is a 69 year old male who presents today for a diabetic foot exam and management.  He sees Dr. Thaddeus MD, for his diabetic care.  He was last seen on 2/2/24.  No new LE complaints.     Review of Systems: No n/v/d/f/c/ns/sob/cp    PMH:   Past Medical History:   Diagnosis Date    Hypertension     Neuropathy     Prediabetes     Sleep apnea        PSxH:   Past Surgical History:   Procedure Laterality Date    OPTICAL TRACKING SYSTEM ARTHROPLASTY KNEE Right 1/4/2018    Procedure: OPTICAL TRACKING SYSTEM ARTHROPLASTY KNEE;  Right Total Knee Arthroplasty;  Surgeon: Marcello Beatty MD;  Location: [facility]       Allergies: Ampicillin, Codeine, and Simvastatin    SH:   Social History     Socioeconomic History    Marital status:      Spouse name: Not on file    Number of children: Not on file    Years of education: Not on file    Highest education level: Not on file   Occupational History    Not on file   Tobacco Use    Smoking status: Some Days     Types: Cigars    Smokeless tobacco: Never    Tobacco comments:     5-6/week   Substance and Sexual Activity    Alcohol use: Yes     Comment: Drink every 2-3 days    Drug use: No    Sexual activity: Not on file   Other Topics Concern    Not on file   Social History Narrative    Not on file     Social Determinants of Health     Financial Resource Strain: Not on file   Food Insecurity: No Food Insecurity (8/17/2023)    [facility]    Hunger Vital Sign     Worried About Running Out of Food in the Last Year: Never true     Ran Out of Food in the Last Year: Never true   Transportation Needs: Not on file   Physical Activity: Not on file   Stress: Not on file   Social Connections: Not on file   Interpersonal Safety: Not on file   Housing Stability: Not on file       FH: No family history on file.    Objective:    PT and DP pulses are 2/4 bilaterally. CRT is instant.  Positive pedal hair. +1 pitting edema to left dorsal foot.   Gross sensation is diminished bilaterally.  Protective sensation is diminished as demonstrated with a 5.07G monofilament.  Equinus intact bilaterally. No pain with active or passive ROM of the ankle, MTJ, 1st ray, or halluces bilaterally,.   A diabetic wound is noted at left  Distal third digit has healed over.    Hyperkeratotic lesion is noted to the bilateral distal halluces.  These are at the tips.  Left was sharply debrided today, no open lesions were noted underneath there.  There is intrasubstance bleeding.      Assessment: Juan Manuel is a 69-year-old type II diabetic with neuropathy and osteomyelitis of the left third digit.    He does have hyperkeratotic lesions with intrasubstance bleeding noted.  He has self debrided his nails.      Plan:  - Pt seen and evaluated.  -Tyloma was debrided x 1.  - Recommend that he use a pumice stone on the calluses, instead of sharps.   -See again in 16 weeks, or sooner if problems arise.

## 2024-08-27 NOTE — LETTER
8/27/2024      Kareem Bai  [address] [address]      Dear Colleague,    Thank you for referring your patient, Kareem Bai, to the [facility] [place]. Please see a copy of my visit note below.    Chief Complaint   Patient presents with     Follow Up     Foot check.              HPI: Kareem is a 69 year old male who presents today for a diabetic foot exam and management.  He sees Dr. Thaddeus MD, for his diabetic care.  He was last seen on 2/2/24.  No new LE complaints.     Review of Systems: No n/v/d/f/c/ns/sob/cp    PMH:   Past Medical History:   Diagnosis Date     Hypertension      Neuropathy      Prediabetes      Sleep apnea        PSxH:   Past Surgical History:   Procedure Laterality Date     OPTICAL TRACKING SYSTEM ARTHROPLASTY KNEE Right 1/4/2018    Procedure: OPTICAL TRACKING SYSTEM ARTHROPLASTY KNEE;  Right Total Knee Arthroplasty;  Surgeon: Marcello Beatty MD;  Location: [facility]       Allergies: Ampicillin, Codeine, and Simvastatin    SH:   Social History     Socioeconomic History     Marital status:      Spouse name: Not on file     Number of children: Not on file     Years of education: Not on file     Highest education level: Not on file   Occupational History     Not on file   Tobacco Use     Smoking status: Some Days     Types: Cigars     Smokeless tobacco: Never     Tobacco comments:     5-6/week   Substance and Sexual Activity     Alcohol use: Yes     Comment: Drink every 2-3 days     Drug use: No     Sexual activity: Not on file   Other Topics Concern     Not on file   Social History Narrative     Not on file     Social Determinants of Health     Financial Resource Strain: Not on file   Food Insecurity: No Food Insecurity (8/17/2023)    Received from [facility]    Hunger Vital Sign      Worried About Running Out of Food in the Last Year: Never true      Ran Out of Food in the Last Year: Never true   Transportation  Needs: Not on file   Physical Activity: Not on file   Stress: Not on file   Social Connections: Not on file   Interpersonal Safety: Not on file   Housing Stability: Not on file       FH: No family history on file.    Objective:    PT and DP pulses are 2/4 bilaterally. CRT is instant. Positive pedal hair. +1 pitting edema to left dorsal foot.   Gross sensation is diminished bilaterally.  Protective sensation is diminished as demonstrated with a 5.07G monofilament.  Equinus intact bilaterally. No pain with active or passive ROM of the ankle, MTJ, 1st ray, or halluces bilaterally,.   A diabetic wound is noted at left  Distal third digit has healed over.    Hyperkeratotic lesion is noted to the bilateral distal halluces.  These are at the tips.  Left was sharply debrided today, no open lesions were noted underneath there.  There is intrasubstance bleeding.      Assessment: Juan Manuel is a 69-year-old type II diabetic with neuropathy and osteomyelitis of the left third digit.    He does have hyperkeratotic lesions with intrasubstance bleeding noted.  He has self debrided his nails.      Plan:  - Pt seen and evaluated.  -Tyloma was debrided x 1.  - Recommend that he use a pumice stone on the calluses, instead of sharps.   -See again in 16 weeks, or sooner if problems arise.             Again, thank you for allowing me to participate in the care of your patient.        Sincerely,        Carmine Santacruz DPM

## 2024-10-22 ENCOUNTER — HOSPITAL ENCOUNTER (EMERGENCY)
Facility: CLINIC | Age: 69
Discharge: HOME | End: 2024-10-23
Attending: EMERGENCY MEDICINE | Admitting: EMERGENCY MEDICINE
Payer: MEDICARE

## 2024-10-22 DIAGNOSIS — M48.061 SPINAL STENOSIS OF LUMBAR REGION, UNSPECIFIED WHETHER NEUROGENIC CLAUDICATION PRESENT: ICD-10-CM

## 2024-10-22 DIAGNOSIS — M54.42 ACUTE BILATERAL LOW BACK PAIN WITH LEFT-SIDED SCIATICA: ICD-10-CM

## 2024-10-22 LAB
ANION GAP SERPL CALCULATED.3IONS-SCNC: 16 MMOL/L (ref 7–15)
BASOPHILS # BLD AUTO: 0.1 10E3/UL (ref 0–0.2)
BASOPHILS NFR BLD AUTO: 1 %
BUN SERPL-MCNC: 16.3 MG/DL (ref 8–23)
CALCIUM SERPL-MCNC: 10.1 MG/DL (ref 8.8–10.4)
CHLORIDE SERPL-SCNC: 102 MMOL/L (ref 98–107)
CREAT SERPL-MCNC: 0.92 MG/DL (ref 0.67–1.17)
EGFRCR SERPLBLD CKD-EPI 2021: 89 ML/MIN/1.73M2
EOSINOPHIL # BLD AUTO: 0.1 10E3/UL (ref 0–0.7)
EOSINOPHIL NFR BLD AUTO: 1 %
ERYTHROCYTE [DISTWIDTH] IN BLOOD BY AUTOMATED COUNT: 13.1 % (ref 10–15)
GLUCOSE SERPL-MCNC: 118 MG/DL (ref 70–99)
HCO3 SERPL-SCNC: 22 MMOL/L (ref 22–29)
HCT VFR BLD AUTO: 45.2 % (ref 40–53)
HGB BLD-MCNC: 15.5 G/DL (ref 13.3–17.7)
IMM GRANULOCYTES # BLD: 0 10E3/UL
IMM GRANULOCYTES NFR BLD: 0 %
LYMPHOCYTES # BLD AUTO: 2.2 10E3/UL (ref 0.8–5.3)
LYMPHOCYTES NFR BLD AUTO: 26 %
MCH RBC QN AUTO: 30.9 PG (ref 26.5–33)
MCHC RBC AUTO-ENTMCNC: 34.3 G/DL (ref 31.5–36.5)
MCV RBC AUTO: 90 FL (ref 78–100)
MONOCYTES # BLD AUTO: 0.6 10E3/UL (ref 0–1.3)
MONOCYTES NFR BLD AUTO: 7 %
NEUTROPHILS # BLD AUTO: 5.5 10E3/UL (ref 1.6–8.3)
NEUTROPHILS NFR BLD AUTO: 65 %
NRBC # BLD AUTO: 0 10E3/UL
NRBC BLD AUTO-RTO: 0 /100
PLATELET # BLD AUTO: 258 10E3/UL (ref 150–450)
POTASSIUM SERPL-SCNC: 4.3 MMOL/L (ref 3.4–5.3)
RBC # BLD AUTO: 5.01 10E6/UL (ref 4.4–5.9)
SODIUM SERPL-SCNC: 140 MMOL/L (ref 135–145)
WBC # BLD AUTO: 8.4 10E3/UL (ref 4–11)

## 2024-10-22 PROCEDURE — 80048 BASIC METABOLIC PNL TOTAL CA: CPT | Performed by: EMERGENCY MEDICINE

## 2024-10-22 PROCEDURE — 36415 COLL VENOUS BLD VENIPUNCTURE: CPT | Performed by: EMERGENCY MEDICINE

## 2024-10-22 PROCEDURE — 96374 THER/PROPH/DIAG INJ IV PUSH: CPT | Performed by: EMERGENCY MEDICINE

## 2024-10-22 PROCEDURE — 250N000013 HC RX MED GY IP 250 OP 250 PS 637: Performed by: EMERGENCY MEDICINE

## 2024-10-22 PROCEDURE — 99285 EMERGENCY DEPT VISIT HI MDM: CPT | Mod: 25 | Performed by: EMERGENCY MEDICINE

## 2024-10-22 PROCEDURE — 99284 EMERGENCY DEPT VISIT MOD MDM: CPT | Performed by: EMERGENCY MEDICINE

## 2024-10-22 PROCEDURE — 85025 COMPLETE CBC W/AUTO DIFF WBC: CPT | Performed by: EMERGENCY MEDICINE

## 2024-10-22 PROCEDURE — 250N000011 HC RX IP 250 OP 636: Performed by: EMERGENCY MEDICINE

## 2024-10-22 RX ORDER — KETOROLAC TROMETHAMINE 15 MG/ML
10 INJECTION, SOLUTION INTRAMUSCULAR; INTRAVENOUS ONCE
Status: COMPLETED | OUTPATIENT
Start: 2024-10-22 | End: 2024-10-22

## 2024-10-22 RX ORDER — DIAZEPAM 2 MG/1
2 TABLET ORAL ONCE
Status: COMPLETED | OUTPATIENT
Start: 2024-10-22 | End: 2024-10-22

## 2024-10-22 RX ADMIN — DIAZEPAM 2 MG: 2 TABLET ORAL at 21:13

## 2024-10-22 RX ADMIN — KETOROLAC TROMETHAMINE 10 MG: 15 INJECTION, SOLUTION INTRAMUSCULAR; INTRAVENOUS at 21:18

## 2024-10-22 ASSESSMENT — ACTIVITIES OF DAILY LIVING (ADL)
ADLS_ACUITY_SCORE: 35
ADLS_ACUITY_SCORE: 35
ADLS_ACUITY_SCORE: 33
ADLS_ACUITY_SCORE: 35

## 2024-10-22 ASSESSMENT — COLUMBIA-SUICIDE SEVERITY RATING SCALE - C-SSRS
6. HAVE YOU EVER DONE ANYTHING, STARTED TO DO ANYTHING, OR PREPARED TO DO ANYTHING TO END YOUR LIFE?: NO
2. HAVE YOU ACTUALLY HAD ANY THOUGHTS OF KILLING YOURSELF IN THE PAST MONTH?: NO
1. IN THE PAST MONTH, HAVE YOU WISHED YOU WERE DEAD OR WISHED YOU COULD GO TO SLEEP AND NOT WAKE UP?: NO

## 2024-10-23 ENCOUNTER — APPOINTMENT (OUTPATIENT)
Dept: CT IMAGING | Facility: CLINIC | Age: 69
End: 2024-10-23
Attending: EMERGENCY MEDICINE
Payer: MEDICARE

## 2024-10-23 ENCOUNTER — APPOINTMENT (OUTPATIENT)
Dept: MRI IMAGING | Facility: CLINIC | Age: 69
End: 2024-10-23
Attending: EMERGENCY MEDICINE
Payer: MEDICARE

## 2024-10-23 VITALS
RESPIRATION RATE: 18 BRPM | TEMPERATURE: 98.4 F | DIASTOLIC BLOOD PRESSURE: 92 MMHG | HEIGHT: 73 IN | SYSTOLIC BLOOD PRESSURE: 145 MMHG | WEIGHT: 215 LBS | OXYGEN SATURATION: 99 % | HEART RATE: 72 BPM | BODY MASS INDEX: 28.49 KG/M2

## 2024-10-23 DIAGNOSIS — M51.362 DEGENERATION OF INTERVERTEBRAL DISC OF LUMBAR REGION WITH DISCOGENIC BACK PAIN AND LOWER EXTREMITY PAIN: Primary | ICD-10-CM

## 2024-10-23 PROCEDURE — G1010 CDSM STANSON: HCPCS

## 2024-10-23 PROCEDURE — 72131 CT LUMBAR SPINE W/O DYE: CPT | Mod: MF

## 2024-10-23 PROCEDURE — 250N000011 HC RX IP 250 OP 636: Performed by: EMERGENCY MEDICINE

## 2024-10-23 PROCEDURE — 99284 EMERGENCY DEPT VISIT MOD MDM: CPT | Mod: 4UV | Performed by: NEUROLOGICAL SURGERY

## 2024-10-23 PROCEDURE — 96374 THER/PROPH/DIAG INJ IV PUSH: CPT | Performed by: EMERGENCY MEDICINE

## 2024-10-23 PROCEDURE — 72148 MRI LUMBAR SPINE W/O DYE: CPT | Mod: MF

## 2024-10-23 PROCEDURE — 96376 TX/PRO/DX INJ SAME DRUG ADON: CPT | Performed by: EMERGENCY MEDICINE

## 2024-10-23 RX ORDER — DIAZEPAM 5 MG/1
5 TABLET ORAL EVERY 6 HOURS PRN
Qty: 10 TABLET | Refills: 0 | Status: SHIPPED | OUTPATIENT
Start: 2024-10-23 | End: 2024-10-26

## 2024-10-23 RX ORDER — HYDROMORPHONE HYDROCHLORIDE 1 MG/ML
0.5 INJECTION, SOLUTION INTRAMUSCULAR; INTRAVENOUS; SUBCUTANEOUS EVERY 30 MIN PRN
Status: COMPLETED | OUTPATIENT
Start: 2024-10-23 | End: 2024-10-23

## 2024-10-23 RX ORDER — OXYCODONE HYDROCHLORIDE 5 MG/1
5 TABLET ORAL EVERY 6 HOURS PRN
Qty: 12 TABLET | Refills: 0 | Status: SHIPPED | OUTPATIENT
Start: 2024-10-23 | End: 2024-10-26

## 2024-10-23 RX ORDER — HYDROMORPHONE HYDROCHLORIDE 1 MG/ML
0.5 INJECTION, SOLUTION INTRAMUSCULAR; INTRAVENOUS; SUBCUTANEOUS ONCE
Status: COMPLETED | OUTPATIENT
Start: 2024-10-23 | End: 2024-10-23

## 2024-10-23 RX ADMIN — HYDROMORPHONE HYDROCHLORIDE 0.5 MG: 1 INJECTION, SOLUTION INTRAMUSCULAR; INTRAVENOUS; SUBCUTANEOUS at 06:18

## 2024-10-23 RX ADMIN — HYDROMORPHONE HYDROCHLORIDE 0.5 MG: 1 INJECTION, SOLUTION INTRAMUSCULAR; INTRAVENOUS; SUBCUTANEOUS at 03:04

## 2024-10-23 RX ADMIN — HYDROMORPHONE HYDROCHLORIDE 0.5 MG: 1 INJECTION, SOLUTION INTRAMUSCULAR; INTRAVENOUS; SUBCUTANEOUS at 07:50

## 2024-10-23 RX ADMIN — HYDROMORPHONE HYDROCHLORIDE 0.5 MG: 1 INJECTION, SOLUTION INTRAMUSCULAR; INTRAVENOUS; SUBCUTANEOUS at 04:41

## 2024-10-23 ASSESSMENT — ACTIVITIES OF DAILY LIVING (ADL)
ADLS_ACUITY_SCORE: 0
ADLS_ACUITY_SCORE: 0
ADLS_ACUITY_SCORE: 35
ADLS_ACUITY_SCORE: 0

## 2024-10-23 NOTE — CONSULTS
"[facility]     NEUROSURGERY CONSULT    This consultation was requested by Dr. Isabel from the ED service.    Reason for Consultation:  New radicular pain and numbness in the setting of prior spine surgery    HPI:  Kareem Bai is a 69 year old male with a notable past medical history of coronary artery disease status post stent on aspirin, history of L4-5 TLIF in 2011, significant diabetes with peripheral neuropathy presenting today with complaint of progressive back pain and leg pain over the course of the past 4 days.  The patient reports that his pain started 4 days ago, starting in his hip and radiating down his inner left thigh, now crossing his knees.  He reports also some pain entering into his groin.  He reports that in the more recent days, the pain has turned more into a burning sensation and has also demonstrated some concern for numbness.  The pain is very significant and has interfered with his activities of daily living.  He reports that he had a similar type of pain in a different distribution in 2011 which ultimately led to his L4-5 fusion.  This was done with [facility] physicians at the time.  He reports that after his fusion his pain had essentially resolved until most recently.  On interview, the patient denies any bowel or bladder symptoms, denies any groin numbness or saddle anesthesia, and denies any upper extremity symptoms.    ROS: 10 point ROS in other systems negative other than noted in HPI.    Physical exam:   Blood pressure (!) 145/92, pulse 72, temperature 98.4  F (36.9  C), temperature source Oral, resp. rate 18, height 1.854 m (6' 1\"), weight 97.5 kg (215 lb), SpO2 99%.  General: Well appearing, NAD  HEENT: atraumatic, normocephalic  PULM: Unlabored respirations  MSK: Appropriate range of motion  NEUROLOGIC:  -- Awake; Alert; oriented x 3  -- Follows commands briskly  -- Speech fluent, spontaneous. No aphasia or " dysarthria.  -- no gaze preference. No apparent hemineglect.  Cranial Nerves:  -- visual fields full to confrontation, PERRL 3-2mm bilat and brisk, extraocular movements intact  -- face symmetrical, tongue midline  -- sensory V1-V3 intact bilaterally  -- palate elevates symmetrically, uvula midline  -- hearing grossly intact bilat  -- Trapezii 5/5 strength bilat symmetric  -- Cerebellar: Finger nose finger without dysmetria, intact rapid alternating motions bilaterally    Motor:  Normal bulk and tone; no tremor, rigidity, or bradykinesia.  No Pronator Drift   Delt Bi Tri Hand Flexion/  Extension Iliopsoas Quadriceps Hamstrings Tibialis Anterior Gastroc    C5 C6 C7 C8/T1 L2 L3 L4-S1 L4 S1   R 5 5 5 5 5 5 5 5 5   L 5 5 5 5 5 5 5 5 5     Sensory: Numb to light touch in the inner thigh of his left leg, and diffuse numbness from his toes upward to mid calf and bilateral legs attributed to his peripheral neuropathy  Reflexes: no clonus, reflex 1+ patellar and ankle bilaterally  Gait: deferred     Pertinent Imaging and Labs:  Imaging Studies:  CT done October 23, 2024  IMPRESSION:  1.  No fracture or posttraumatic subluxation.  2.  Postoperative changes with multilevel degenerative spondylolisthesis, as above.  3.  At L3-L4, there is severe central spinal canal stenosis with severe bilateral neural foraminal stenosis and contact of the bilateral exiting L3 nerve roots, left greater than right.  4.  At L5-S1 there is mild central spinal canal stenosis with severe bilateral neural foraminal stenosis.  5.  At L1-L2 there is moderate central spinal canal stenosis with mild right lateral recess stenosis and severe left lateral recess stenosis.     MRI done October 23, 2024  IMPRESSION:  1.   Left L5-S1 disc protrusion which contributes to severe left neural foraminal stenosis and likely compression of exiting left L5 nerve root.  2.  Moderate bilateral L5-S1 subarticular recess narrowing with potential compression of the  descending bilateral S1 nerve roots.  3.  L3-L4 moderate to severe spinal canal stenosis.     ASSESSMENT:  Multilevel degenerative disease in the setting of prior fusion at L4-5 associated with adjacent segment disease and spondylolisthesis    RECOMMENDATIONS:  No emergent neurosurgical intervention indicated at this time, the patient will likely need a surgical extension of his fusion, however the patient is otherwise full strength neurologically but has numbness.  At this point we will have the patient follow-up when close interval in clinic with Dr. Gutierrez Cotton, our spine staff, to plan for a more extensive surgery if indicated  Pain control per ED  Okay to discharge with close follow-up    The patient's presentation, exam, and imaging and the listed recommendations/plan were discussed with Dr. Delvis Arriaza, neurosurgery chief resident, and Dr. Norton, neurosurgery staff.    Armando George MD  Neurosurgery  Pager: [phone]

## 2024-10-23 NOTE — ED PROVIDER NOTES
"Patient received in signout from Dr. Menjivar.  See her note for further documentation.  Patient presented to the ED for evaluation of significant left lower back pain that radiated to the groin with associated numbness.  Evaluated by neurosurgery, plan for MRI.  Unfortunately MRI was initially unable to be obtained due to \"possible intra-abdominal hardware or IVC filter\".  Upon my reassessment, patient notes that he does not have such hardware and his only hardware is an inguinal hernia mesh from 1985.  He has never heard of an IVC filter. This was discussed with the neuro surgery team as well as the MRI tech and will proceed with MRI at this time.      MRI shows L5-S1 disc protrusion causing compression of the exiting L5 nerve root.  Discussed with neurosurgery and their official recommendations are pending at this time.  Will sign out to morning provider.      Lumbar spine MRI w/o contrast   Final Result   IMPRESSION:   1.   Left L5-S1 disc protrusion which contributes to severe left neural foraminal stenosis and likely compression of exiting left L5 nerve root.   2.  Moderate bilateral L5-S1 subarticular recess narrowing with potential compression of the descending bilateral S1 nerve roots.   3.  L3-L4 moderate to severe spinal canal stenosis.       CT Lumbar Spine w/o Contrast   Final Result   IMPRESSION:   1.  No fracture or posttraumatic subluxation.   2.  Postoperative changes with multilevel degenerative spondylolisthesis, as above.   3.  At L3-L4, there is severe central spinal canal stenosis with severe bilateral neural foraminal stenosis and contact of the bilateral exiting L3 nerve roots, left greater than right.   4.  At L5-S1 there is mild central spinal canal stenosis with severe bilateral neural foraminal stenosis.   5.  At L1-L2 there is moderate central spinal canal stenosis with mild right lateral recess stenosis and severe left lateral recess stenosis.               Raúl Isabel, " DO  10/25/24 0503

## 2024-10-23 NOTE — ED TRIAGE NOTES
Triage Assessment (Adult)       Row Name 10/22/24 1959          Triage Assessment    Airway WDL WDL        Respiratory WDL    Respiratory WDL WDL        Skin Circulation/Temperature WDL    Skin Circulation/Temperature WDL WDL        Cardiac WDL    Cardiac WDL WDL        Peripheral/Neurovascular WDL    Peripheral Neurovascular WDL WDL        Cognitive/Neuro/Behavioral WDL    Cognitive/Neuro/Behavioral WDL WDL

## 2024-10-23 NOTE — ED PROVIDER NOTES
"    [facility] [place] EMERGENCY DEPARTMENT ([facility])    10/22/24      ED PROVIDER NOTE    History     Chief Complaint   Patient presents with    Back Pain     Pt started feeling low back pain for 2 days, started doing stretching and exercise. Pain got worse Monday morning. Today pt endorses worsening pain radiating to his left thigh which he reports shooting and goes around to his left groin area. Pt had spinal fusion in 2011     HPI  Kareem Bai is a 69 year old male with history of spinal stenosis s/p lumbar fusion L4-5 in 2011 and DM2 with polyneuropathy who presents to the ED for evaluation of low back pain.  Patient reports low back pain beginning 2 days ago and gradually worsening.  He denies any falls, car accidents, or heavy lifting to bring this on it, states it just began suddenly.  Denies history of similar pain with prior back issues.  He reports numbness in his left thigh/groin.  He reports burning/shooting pain going down his legs.  He also reports that his left leg has been giving out today which is new.  Denies any loss of bowel or bladder.  Denies any numbness near his rectum.  Denies any fevers.  Denies any abdominal pain.  Denies any urinary changes.      Physical Exam   BP: (!) 140/91  Pulse: 71  Temp: 98.4  F (36.9  C)  Resp: 18  Height: 185.4 cm (6' 1\")  Weight: 97.5 kg (215 lb)  SpO2: 96 %  Physical Exam  General: patient is alert and oriented and in no acute distress   Head: atraumatic and normocephalic   EENT: moist mucus membranes without tonsillar erythema or exudates, pupils round and reactive   Neck: supple   Cardiovascular: regular rate and rhythm, extremities warm and well perfused, no lower extremity edema, 2+ PT pulses bilaterally  Abdomen: soft, non-tender, no CVA TTP  Musculoskeletal: normal range of motion   Neurological: alert and oriented, moving all extremities symmetrically, 5/5 strength in the bilateral lower extremities with ankle plantar/dorsiflexion, knee " flexion/extension, slight decreased strength on left hip flexion with give away weakness, diminished sensation to light touch in the bilateral lower extremities from knees down (baseline), diminished sensation to light touch on the left anterior thigh  Skin: warm, dry     ED Course, Procedures, & Data      Procedures                No results found for any visits on 10/22/24.  Medications - No data to display  Labs Ordered and Resulted from Time of ED Arrival to Time of ED Departure - No data to display  No orders to display          Critical care was not performed.     Medical Decision Making  The patient's presentation was of high complexity (an acute health issue posing potential threat to life or bodily function).    The patient's evaluation involved:  ordering and/or review of 3+ test(s) in this encounter (see separate area of note for details)  discussion of management or test interpretation with another health professional (NSG)    The patient's management necessitated moderate risk (prescription drug management including medications given in the ED) and further care after sign-out to Dr. Isabel (see their note for further management).    Assessment & Plan    Mr. Bai is a 69 year old male with history of spinal stenosis s/p lumbar fusion L4-5 in 2011 and DM2 with polyneuropathy who presents to the ED for evaluation of low back pain with new onset numbness and weakness in the left leg.  He is hemodynamically stable, intact pulses in the lower extremities.  He has slight weakness with hip flexion in the left leg on exam.  No red flag findings for cauda equina, epidural abscess/hematoma.  MRI initially ordered however patient reportedly has an IVC filter from 1985 and per MRI staff, unsafe to proceed with MRI.  CT lumbar ordered and pending.  NSG consulted for evaluation and recommendations on additional imaging/intervention.  He was given toradol and valium for symptom control.  Signed out to Dr. Isabel pending  CT and NSG consultation.     I have reviewed the nursing notes. I have reviewed the findings, diagnosis, plan and need for follow up with the patient.    New Prescriptions    No medications on file       Final diagnoses:   Acute bilateral low back pain with left-sided sciatica   Spinal stenosis of lumbar region, unspecified whether neurogenic claudication present   I, Oscar Lemon, am serving as a trained medical scribe to document services personally performed by Maryanne Quezada MD based on the provider's statements to me on October 22, 2024.  This document has been checked and approved by the attending provider.    I, Maryanne Quezada MD, was physically present and have reviewed and verified the accuracy of this note documented by Oscar Lemon medical scribe.      Maryanne Quezada MD  [facility] EMERGENCY DEPARTMENT  10/22/2024     Maryanne Quezada MD  10/23/24 1042

## 2024-10-24 NOTE — TELEPHONE ENCOUNTER
SPINE PATIENTS - NEW PROTOCOL PREVISIT    RECORDS RECEIVED FROM: internal   REASON FOR VISIT: New radicular pain and numbness in the setting of prior spine surgery    PROVIDER: Dr. Cotton   DATE OF APPT: 11/1/24   NOTES (FOR ALL VISITS) STATUS DETAILS   OFFICE NOTE from referring provider Internal SEE INPATIENT NOTES   DISCHARGE REPORT from ER Internal [facility]:  10/22/24   MEDICATION LIST Internal    IMAGING  (FOR ALL VISITS)     MRI (HEAD, NECK, SPINE) [facility]:  MRI Lumbar Spine 10/23/24   XR [facility]:  XR Spine Complete 11/1/24  XR Lumbar Spine 11/1/24   CT (HEAD, NECK, SPINE) [facility]:  CT Lumbar Spine 10/23/24

## 2024-10-31 DIAGNOSIS — M51.362 DEGENERATION OF INTERVERTEBRAL DISC OF LUMBAR REGION WITH DISCOGENIC BACK PAIN AND LOWER EXTREMITY PAIN: Primary | ICD-10-CM

## 2024-11-01 ENCOUNTER — OFFICE VISIT (OUTPATIENT)
Dept: NEUROSURGERY | Facility: CLINIC | Age: 69
End: 2024-11-01
Payer: COMMERCIAL

## 2024-11-01 ENCOUNTER — PRE VISIT (OUTPATIENT)
Dept: NEUROSURGERY | Facility: CLINIC | Age: 69
End: 2024-11-01

## 2024-11-01 ENCOUNTER — ANCILLARY PROCEDURE (OUTPATIENT)
Dept: GENERAL RADIOLOGY | Facility: CLINIC | Age: 69
End: 2024-11-01
Attending: NEUROLOGICAL SURGERY
Payer: MEDICARE

## 2024-11-01 VITALS
HEART RATE: 62 BPM | HEIGHT: 74 IN | RESPIRATION RATE: 16 BRPM | SYSTOLIC BLOOD PRESSURE: 157 MMHG | OXYGEN SATURATION: 97 % | WEIGHT: 225 LBS | DIASTOLIC BLOOD PRESSURE: 92 MMHG | BODY MASS INDEX: 28.88 KG/M2

## 2024-11-01 DIAGNOSIS — M51.362 DEGENERATION OF INTERVERTEBRAL DISC OF LUMBAR REGION WITH DISCOGENIC BACK PAIN AND LOWER EXTREMITY PAIN: ICD-10-CM

## 2024-11-01 DIAGNOSIS — M54.42 ACUTE BILATERAL LOW BACK PAIN WITH LEFT-SIDED SCIATICA: ICD-10-CM

## 2024-11-01 DIAGNOSIS — M48.061 SPINAL STENOSIS OF LUMBAR REGION, UNSPECIFIED WHETHER NEUROGENIC CLAUDICATION PRESENT: ICD-10-CM

## 2024-11-01 PROCEDURE — 99203 OFFICE O/P NEW LOW 30 MIN: CPT | Performed by: NEUROLOGICAL SURGERY

## 2024-11-01 PROCEDURE — 72110 X-RAY EXAM L-2 SPINE 4/>VWS: CPT | Performed by: STUDENT IN AN ORGANIZED HEALTH CARE EDUCATION/TRAINING PROGRAM

## 2024-11-01 PROCEDURE — 72082 X-RAY EXAM ENTIRE SPI 2/3 VW: CPT | Mod: XS | Performed by: STUDENT IN AN ORGANIZED HEALTH CARE EDUCATION/TRAINING PROGRAM

## 2024-11-01 RX ORDER — CYANOCOBALAMIN 1000 UG/ML
1000 INJECTION, SOLUTION INTRAMUSCULAR; SUBCUTANEOUS
COMMUNITY
Start: 2024-09-30 | End: 2025-09-25

## 2024-11-01 RX ORDER — ROSUVASTATIN CALCIUM 40 MG/1
1 TABLET, COATED ORAL DAILY
COMMUNITY
Start: 2024-07-30

## 2024-11-01 RX ORDER — GABAPENTIN 300 MG/1
300 CAPSULE ORAL 3 TIMES DAILY
Qty: 90 CAPSULE | Refills: 0 | Status: SHIPPED | OUTPATIENT
Start: 2024-11-01 | End: 2024-12-01

## 2024-11-01 RX ORDER — HYDROXYZINE PAMOATE 25 MG/1
25 CAPSULE ORAL
COMMUNITY
Start: 2023-09-19

## 2024-11-01 RX ORDER — SEMAGLUTIDE 2.68 MG/ML
INJECTION, SOLUTION SUBCUTANEOUS
COMMUNITY
Start: 2024-12-16

## 2024-11-01 RX ORDER — SEMAGLUTIDE 1.34 MG/ML
INJECTION, SOLUTION SUBCUTANEOUS
COMMUNITY
Start: 2024-11-18

## 2024-11-01 RX ORDER — SILDENAFIL 100 MG/1
1 TABLET, FILM COATED ORAL DAILY PRN
COMMUNITY
Start: 2024-09-23

## 2024-11-01 ASSESSMENT — PAIN SCALES - GENERAL: PAINLEVEL_OUTOF10: EXTREME PAIN (9)

## 2024-11-01 NOTE — PROGRESS NOTES
Date of service: 11/1/2024    Kareem Bai is a 69-year-old male with a history of coronary artery disease s/p stent on aspirin and L4-5 TLIF in 2011 and diabetes with peripheral neuropathy who presented to the ER about a week ago for complaints of severe low back pain and leg pain in the preceding 4 to 5 days.  At the time he had pain radiating down into the his hip and into the left inner thigh and sometimes into the groin.  Since then his back pain has improved and now he has more burning sensation and pain in the anterior thigh down to the knee.    He previously had L4-5 TLIF at [facility] in 2011    Past Medical History:   Diagnosis Date    Hypertension     Neuropathy     Prediabetes     Sleep apnea      Past Surgical History:   Procedure Laterality Date    OPTICAL TRACKING SYSTEM ARTHROPLASTY KNEE Right 1/4/2018    Procedure: OPTICAL TRACKING SYSTEM ARTHROPLASTY KNEE;  Right Total Knee Arthroplasty;  Surgeon: Marcello Beatty MD;  Location: [facility] OR     Current Outpatient Medications   Medication Sig Dispense Refill    Cadexomer Iodine, topical, 0.9% (IODOSORB) 0.9 % GEL gel Apply topically daily 10 g 0    ceFAZolin (ANCEF) intermittent infusion 2 g in 100 mL dextrose PRE-MIX Inject 100 mLs (2 g) into the vein every 8 hours Continue for 6 weeks (Nov 24, 2021)      ciclopirox (LOPROX) 0.77 % cream Apply topically 2 times daily To toenails. 90 g 4    cyanocobalamin (CYANOCOBALAMIN) 1000 mcg/mL injection Inject 1,000 mcg into the muscle every 30 days.      gabapentin (NEURONTIN) 300 MG capsule Take 1 capsule (300 mg) by mouth 3 times daily. 90 capsule 0    gabapentin (NEURONTIN) 300 MG capsule TAKE 1 CAPSULE BY MOUTH THREE TIMES DAILY  11    hydrOXYzine bret (VISTARIL) 25 MG capsule Take 25 mg by mouth.      ibuprofen (ADVIL/MOTRIN) 600 MG tablet Take 1 tablet (600 mg) by mouth every 6 hours as needed for moderate pain 20 tablet 0    lisinopril-hydrochlorothiazide (PRINZIDE/ZESTORETIC) 20-25 MG per  tablet Take 1 tablet by mouth daily       metFORMIN (GLUCOPHAGE) 1000 MG tablet TAKE 1 TABLET BY MOUTH TWICE DAILY WITH MEALS      naproxen (NAPROSYN) 500 MG tablet Take 500 mg by mouth 2 times daily as needed for moderate pain       povidone-iodine (BETADINE) 10 % ointment Apply topically daily 28 g 0    pravastatin (PRAVACHOL) 10 MG tablet Take 10 mg by mouth daily       rosuvastatin (CRESTOR) 40 MG tablet Take 1 tablet by mouth daily.      semaglutide (OZEMPIC) 2 MG/3ML pen Inject 0.25mg subcutaneously once a week for 4 weeks, then increase dose and inject 0.5mg once a week for 4 weeks.      [START ON 11/18/2024] Semaglutide, 1 MG/DOSE, (OZEMPIC, 1 MG/DOSE,) 4 MG/3ML pen Inject 1 mg subcutaneously once a week for 4 weeks Do not start before November 18, 2024.      [START ON 12/16/2024] Semaglutide, 2 MG/DOSE, (OZEMPIC, 2 MG/DOSE,) 8 MG/3ML pen Inject 2 mg subcutaneously once a week Do not start before December 16, 2024.      sildenafil (VIAGRA) 100 MG tablet Take 1 tablet by mouth daily as needed.      clopidogrel (PLAVIX) 75 MG tablet Take 75 mg by mouth       No current facility-administered medications for this visit.     He has no weakness or numbness.  He has no gait or balance difficulty.  DTRs 1+ bilaterally.      I personally reviewed the lumbar spine CT and MRI scan which shows evidence of vacuum phenomena at L3-4 and L5-S1.  Pedicle screws are present at L4 and L5.  Severe spinal canal stenosis is noted at L3-4 with the left L3-4 foraminal stenosis compressing the L3 nerve root.  There is a left-sided disc herniation on the left side at L1-2 compressing the L2 nerve root.    Impression/plan:  Kareem Bai is a 69-year-old male with a history of L4-5 TLIF in 2011 who presents with low back pain and left leg pain.  A week ago when he presented to the ER he had primarily low back pain with pain rating down into his groin and inner thigh but today his primary pain is in the L3 nerve distribution.   He has  both the left L2 compression due to left L1-2 disc protrusion and the left L3 compression due to left L3-4 foraminal stenosis.    For now, I will refer him to our pain clinic for consideration of steroid injection.  I will see him back in the clinic if the injections are not effective.    Gutierrez Cotton MD.

## 2024-11-01 NOTE — LETTER
11/1/2024       RE: Kareem Bai  [address]  [place] [facility] [address]     Dear Colleague,    Thank you for referring your patient, Kareem Bai, to the [facility] [place] [facility]. Please see a copy of my visit note below.    Date of service: 11/1/2024    Kareem Bai is a 69-year-old male with a history of coronary artery disease s/p stent on aspirin and L4-5 TLIF in 2011 and diabetes with peripheral neuropathy who presented to the ER about a week ago for complaints of severe low back pain and leg pain in the preceding 4 to 5 days.  At the time he had pain radiating down into the his hip and into the left inner thigh and sometimes into the groin.  Since then his back pain has improved and now he has more burning sensation and pain in the anterior thigh down to the knee.    He previously had L4-5 TLIF at [facility] in 2011    Past Medical History:   Diagnosis Date     Hypertension      Neuropathy      Prediabetes      Sleep apnea      Past Surgical History:   Procedure Laterality Date     OPTICAL TRACKING SYSTEM ARTHROPLASTY KNEE Right 1/4/2018    Procedure: OPTICAL TRACKING SYSTEM ARTHROPLASTY KNEE;  Right Total Knee Arthroplasty;  Surgeon: Marcello Beatty MD;  Location: [facility] OR     Current Outpatient Medications   Medication Sig Dispense Refill     Cadexomer Iodine, topical, 0.9% (IODOSORB) 0.9 % GEL gel Apply topically daily 10 g 0     ceFAZolin (ANCEF) intermittent infusion 2 g in 100 mL dextrose PRE-MIX Inject 100 mLs (2 g) into the vein every 8 hours Continue for 6 weeks (Nov 24, 2021)       ciclopirox (LOPROX) 0.77 % cream Apply topically 2 times daily To toenails. 90 g 4     cyanocobalamin (CYANOCOBALAMIN) 1000 mcg/mL injection Inject 1,000 mcg into the muscle every 30 days.       gabapentin (NEURONTIN) 300 MG capsule Take 1 capsule (300 mg) by mouth 3 times daily. 90 capsule 0     gabapentin  (NEURONTIN) 300 MG capsule TAKE 1 CAPSULE BY MOUTH THREE TIMES DAILY  11     hydrOXYzine bret (VISTARIL) 25 MG capsule Take 25 mg by mouth.       ibuprofen (ADVIL/MOTRIN) 600 MG tablet Take 1 tablet (600 mg) by mouth every 6 hours as needed for moderate pain 20 tablet 0     lisinopril-hydrochlorothiazide (PRINZIDE/ZESTORETIC) 20-25 MG per tablet Take 1 tablet by mouth daily        metFORMIN (GLUCOPHAGE) 1000 MG tablet TAKE 1 TABLET BY MOUTH TWICE DAILY WITH MEALS       naproxen (NAPROSYN) 500 MG tablet Take 500 mg by mouth 2 times daily as needed for moderate pain        povidone-iodine (BETADINE) 10 % ointment Apply topically daily 28 g 0     pravastatin (PRAVACHOL) 10 MG tablet Take 10 mg by mouth daily        rosuvastatin (CRESTOR) 40 MG tablet Take 1 tablet by mouth daily.       semaglutide (OZEMPIC) 2 MG/3ML pen Inject 0.25mg subcutaneously once a week for 4 weeks, then increase dose and inject 0.5mg once a week for 4 weeks.       [START ON 11/18/2024] Semaglutide, 1 MG/DOSE, (OZEMPIC, 1 MG/DOSE,) 4 MG/3ML pen Inject 1 mg subcutaneously once a week for 4 weeks Do not start before November 18, 2024.       [START ON 12/16/2024] Semaglutide, 2 MG/DOSE, (OZEMPIC, 2 MG/DOSE,) 8 MG/3ML pen Inject 2 mg subcutaneously once a week Do not start before December 16, 2024.       sildenafil (VIAGRA) 100 MG tablet Take 1 tablet by mouth daily as needed.       clopidogrel (PLAVIX) 75 MG tablet Take 75 mg by mouth       No current facility-administered medications for this visit.     He has no weakness or numbness.  He has no gait or balance difficulty.  DTRs 1+ bilaterally.      I personally reviewed the lumbar spine CT and MRI scan which shows evidence of vacuum phenomena at L3-4 and L5-S1.  Pedicle screws are present at L4 and L5.  Severe spinal canal stenosis is noted at L3-4 with the left L3-4 foraminal stenosis compressing the L3 nerve root.  There is a left-sided disc herniation on the left side at L1-2 compressing the L2  nerve root.    Impression/plan:  Kareem Bai is a 69-year-old male with a history of L4-5 TLIF in 2011 who presents with low back pain and left leg pain.  A week ago when he presented to the ER he had primarily low back pain with pain rating down into his groin and inner thigh but today his primary pain is in the L3 nerve distribution.   He has both the left L2 compression due to left L1-2 disc protrusion and the left L3 compression due to left L3-4 foraminal stenosis.    For now, I will refer him to our pain clinic for consideration of steroid injection.  I will see him back in the clinic if the injections are not effective.    Gutierrez Cotton MD.      Again, thank you for allowing me to participate in the care of your patient.      Sincerely,    Gutierrez Cotton MD

## 2024-11-06 ENCOUNTER — OFFICE VISIT (OUTPATIENT)
Dept: ANESTHESIOLOGY | Facility: CLINIC | Age: 69
End: 2024-11-06
Attending: NEUROLOGICAL SURGERY
Payer: MEDICARE

## 2024-11-06 ENCOUNTER — TELEPHONE (OUTPATIENT)
Dept: PALLIATIVE MEDICINE | Facility: CLINIC | Age: 69
End: 2024-11-06

## 2024-11-06 VITALS
OXYGEN SATURATION: 95 % | RESPIRATION RATE: 16 BRPM | SYSTOLIC BLOOD PRESSURE: 139 MMHG | HEART RATE: 65 BPM | DIASTOLIC BLOOD PRESSURE: 89 MMHG

## 2024-11-06 DIAGNOSIS — M54.42 ACUTE BILATERAL LOW BACK PAIN WITH LEFT-SIDED SCIATICA: ICD-10-CM

## 2024-11-06 DIAGNOSIS — M54.16 LUMBAR RADICULOPATHY: Primary | ICD-10-CM

## 2024-11-06 DIAGNOSIS — M48.061 SPINAL STENOSIS OF LUMBAR REGION, UNSPECIFIED WHETHER NEUROGENIC CLAUDICATION PRESENT: ICD-10-CM

## 2024-11-06 PROCEDURE — 99204 OFFICE O/P NEW MOD 45 MIN: CPT | Mod: GC | Performed by: ANESTHESIOLOGY

## 2024-11-06 ASSESSMENT — PAIN SCALES - GENERAL: PAINLEVEL_OUTOF10: EXTREME PAIN (8)

## 2024-11-06 ASSESSMENT — ENCOUNTER SYMPTOMS: MUSCULOSKELETAL NEGATIVE: 1

## 2024-11-06 NOTE — NURSING NOTE
RN read through the instructions with the patient for the recommended procedure: LESI  Patient verbalized understanding to holding appropriate medication per protocol and was agreeable to NPO policy and needing a .    Anticoagulant: reports he does not take Plavix anymore, on ASA - 6 day hold    Recommended Follow Up: as needed after procedure    Neisha Barclay RN

## 2024-11-06 NOTE — TELEPHONE ENCOUNTER
Called patient to schedule procedure with Dr. Mayer    Date of Procedure: 11/26/24    Arrival time given: Yes: Arrival Time 12:30pm       Procedure Location: [facility]     Verified Location with Patient:  Yes  Address provided to the patient     Pre-op H&P Required:  No: Local anesthesia       Post-Op/Follow Up Appt:  Not Indicated in Request      Informed patient they will need a  to drive them home:  Yes    Patients : Spouse     Patient is aware that pre-op RN from the procedure center will call 2-3 days prior to scheduled procedure to confirm arrival time and review any instructions:  Yes       Additional Comments: N/A        Lizbet Ríos MA on 11/6/2024 at 12:09 PM      P: [phone]*

## 2024-11-06 NOTE — PATIENT INSTRUCTIONS
Procedures:    Call to schedule your procedure: [phone] option #2    Lumbar Epidural Steroid Injection- schedule with any next available provider    Your pre-procedure instructions are below, please call our clinic if you have any questions.      Recommended Follow up:      Follow up as needed after the procedure.      To speak with a nurse, schedule/reschedule/cancel a clinic appointment, or request a medication refill call: [phone]    You can also reach us by [employer]: [email or web address]     Procedure Information:     Please call [phone] option #2 to schedule, reschedule, or cancel your procedure appointment.   Phones are answered Monday - Friday from 08:00 - 4:30pm.  Leave a voicemail with your name, birth date, and phone number if no one is available to take your call.        You no longer need to test for COVID- 19 prior to your procedure/surgery, unless your physician specifically requests that you test. If you experience COVID symptoms or have tested positive for COVID-19 within 14 days of your scheduled surgery or procedure, please update our office right away and your procedure may have to be postponed.       The procedure center staff will call you several days before the procedure to review important information that you will need to know for the day of the procedure.     Please contact the clinic if you have further questions about this information [phone].         Information related to Scheduling and Pre-Procedure Instructions:    If you must reschedule your procedure more than two times, you must follow up in clinic before rescheduling again.  Preparing for your procedure    CAUTION - FAILURE TO FOLLOW THESE PRE-PROCEDURE INSTRUCTIONS WILL RESULT IN YOUR PROCEDURE BEING RESCHEDULED.    Your Procedure: Lumbar Epidural Steroid Injection            You must have a  take you home after your procedure. Transportation by taxi or para-transit is okay as  long as you have a responsible adult accompany you. You must provide your 's full name and contact number at time of check in.     Fasting Protocol Please have nothing to eat or drink 2 hour prior to arrival.     Medications If you take any medications, DO NOT STOP. Take your medications as usual the day of your procedure with a sip of water AT LEAST 2 HOURS PRIOR TO ARRIVAL.    Antibiotics If you are currently taking antibiotics, you must complete the entire dose 7 days prior to your scheduled procedure. You must be clear of any signs or symptoms of infection. If you begin antibiotics, please contact our clinic for instructions.     Fever, Chills, or Rash If you experience a fever of higher than 100 degrees, chills, rash, or open wounds during the one week before your procedure, please call the clinic to see if you may proceed with your procedure.       Medication Hold List   **Patients under Cardiology/Neurology care should consult their provider prior to the pain procedure to verify pre-procedure medication instructions. The information below contains general guidelines.**      Blood Thinners  If you are taking daily ASPIRIN, PLAVIX, OR OTHER BLOOD THINNERS SUCH AS COUMADIN/WARFARIN, we will need your prescribing doctor to sign a release permitting you to stop these medications. Once approved by your prescribing doctor - STOP ALL BLOOD THINNERS BASED ON THE TIME TABLE BELOW PRIOR TO YOUR PROCEDURE. If you have been instructed to stop WARFARIN(COUMADIN), you must have an INR lab drawn the day before your procedure. Your INR must be within normal limits before we can perform your injection. MEDICATIONS CAN BE RESTARTED AFTER YOUR PROCEDURE.    24 HOUR HOLD  Lovenox (enoxaparin)  Agrylin (Anagrelide)    3 DAY HOLD  Xarelto (rivaroxaban)    5 DAY HOLD  Coumadin (Warfarin)  Brilinta (ticagrelor)    6 DAY HOLD  Aspirin 7 DAY HOLD  Anacin, Bufferin, Ecotrin,   Pradexa (Dabigatran)  Elmiron (Pentosan)  Plavix  (Clopidogrel Bisulfate)  Pletal (Cilostazol)    10 DAY HOLD  Effient (Prasugel)    14 DAY HOLD  Ticlid (ticlopidine)        Non-steroidal Anti-inflammatories (NSAIDs) DO NOT TAKE any non-steroidal anti-inflammatory medications (NSAIDs) listed on the table below. MEDICATIONS CAN BE RESTARTED AFTER YOUR PROCEDURE. Celebrex is OK to take and does not need to be discontinued.     Medications to stop:  1 DAY HOLD  Advil, Motrin (Ibuprofen)  Voltaren (Diclofenac)  Toradol (Ketorolac)    3 DAY HOLD  Arthrotec (diclofenac sodium/misoprostol)  Clinoril (Sulindac)  Indocin (Indomethacin)  Lodine (Etodolac)  Vicoprofen (Hydrocodone and Ibuprofen)  Apixaban (Eliquis)    4 DAY HOLD  Mobic (Meloxicam)  Naprosyn (Naproxen)   7 DAY HOLD  Darvon compound (contains aspirin)  Norgesic Forte (contains aspirin)  Oruvall (Ketoprofen)  Percodan (contains aspirin)  Relafen (Nabumetone)  Salsalate  Trilisate  Vitamin E (more than 400 mg per day)  Any medication containing aspirin    14 DAY HOLD  Daypro (Oxaprozin)  Feldene (Piroxicam)                To speak with a nurse, schedule/reschedule/cancel a clinic appointment, or request a medication refill call: [phone]    You can also reach us by [employer]: [email or web address]

## 2024-11-06 NOTE — NURSING NOTE
"Patient presents with:  Consult: I guess I am getting some shots\"  Pain      Extreme Pain (8)         What medications are you using for pain? Gabapentin,naproxen    (New patients only) Have you been seen by another pain clinic/ provider? no    Expectations: \"shots\"    Denise Jorgensen LPN      "

## 2024-11-06 NOTE — PROGRESS NOTES
"[facility] : Consultation Note    Patient: Kareem Bai Age: 69 year old   MRN: [id number] Referred by:  Lula     Date of Visit: November 6, 2024    Reason for consultation:    Kareem Bai is a 69 year old male who is seen in consultation today at the request of his provider Dr. Cotton for a comprehensive evaluation and management of pain.  Primary Care Provider is Avis Travis    Chief complaints:    Chief Complaint   Patient presents with    Consult     I guess I am getting some shots\"    Pain       History of Present illness:     Kareem Bai is a 69 year old male with pain history as described below:     Location: Lower back to anterior left thigh. Worse at anterior thigh.  Laterality: Left  Quality: Numbness and burning  Radiation: Back to the leg  Duration: Three weeks  Severity: 10/10 with activity; 5/10 at rest  Aggravating factors include: Walking and standing  Relieving factors include: Rest  Any bowel or bladder incontinence: No  Other associated symptoms: None    The patient has a medical history significant for L4-5 TLIF (2011), severe L3-4 spinal canal stenosis, left L3-4 foraminal stenosis compressing the L3 nerve root, and L1-2 left-sided disc herniation compressing the L2 nerve root.     Trials of therapies including     PT: Yes: home exercises  TENs Unit: No  Manual Medicine/Chiropractic: Yes; 20 years ago  Surgeries:Yes: fusion as above  Acupuncture: Yes; 15 years ago  Other Integrative therapies: No  Behavioral interventions: No  Previous medication treatments included: gabapentin 300 mg TID  Previous pain interventions: Steroid injections of the knee for arthritis    [place] Prescription Monitoring Program:   Reviewed. No concerns    Review of Systems:  Review of Systems   Musculoskeletal: Negative.    Skin: Negative.        Patient Supplied Answers To the  Pain Questionnaire      11/1/2024     8:26 AM    " Pain -  Patient Entered Questionnaire/Answers   What number best describes your pain right now:  0 = No pain  to  10 = Worst pain imaginable 9   How would you describe the pain burning    sharp    numbness    dull, aching    throbbing   Which of the following worsen your pain standing    walking   Which of the following improve or reduce your pain lying down    sitting   What number best describes your average pain for the past week:  0 = No pain  to  10 = Worst pain imaginable 9   What number best describes your LOWEST pain in past 24 hours:  0 = No pain  to  10 = Worst pain imaginable 7   What number best describes your WORST pain in past 24 hours:  0 = No pain  to  10 = Worst pain imaginable 10   When is your pain worst Constant   What non-medicine treatments have you already had for your pain other                 No data to display                     2/19/2024     9:24 AM 6/30/2023    10:10 AM   PHQ-2 ( 1999 [employer])   Q1: Little interest or pleasure in doing things 0 0   Q2: Feeling down, depressed or hopeless 0 0   PHQ-2 Score 0 0             No data to display                   Past Medical History:  Past Medical History:   Diagnosis Date    Hypertension     Neuropathy     Prediabetes     Sleep apnea        Past Surgical History:  Past Surgical History:   Procedure Laterality Date    OPTICAL TRACKING SYSTEM ARTHROPLASTY KNEE Right 1/4/2018    Procedure: OPTICAL TRACKING SYSTEM ARTHROPLASTY KNEE;  Right Total Knee Arthroplasty;  Surgeon: Marcello Beatty MD;  Location: [facility] OR       Medications:  Current Outpatient Medications   Medication Sig Dispense Refill    Cadexomer Iodine, topical, 0.9% (IODOSORB) 0.9 % GEL gel Apply topically daily 10 g 0    ceFAZolin (ANCEF) intermittent infusion 2 g in 100 mL dextrose PRE-MIX Inject 100 mLs (2 g) into the vein every 8 hours Continue for 6 weeks (Nov 24, 2021)      ciclopirox (LOPROX) 0.77 % cream Apply topically 2 times daily To toenails. 90 g 4    cyanocobalamin  (CYANOCOBALAMIN) 1000 mcg/mL injection Inject 1,000 mcg into the muscle every 30 days.      gabapentin (NEURONTIN) 300 MG capsule Take 1 capsule (300 mg) by mouth 3 times daily. 90 capsule 0    hydrOXYzine bret (VISTARIL) 25 MG capsule Take 25 mg by mouth.      lisinopril-hydrochlorothiazide (PRINZIDE/ZESTORETIC) 20-25 MG per tablet Take 1 tablet by mouth daily       metFORMIN (GLUCOPHAGE) 1000 MG tablet TAKE 1 TABLET BY MOUTH TWICE DAILY WITH MEALS      naproxen (NAPROSYN) 500 MG tablet Take 500 mg by mouth 2 times daily as needed for moderate pain       povidone-iodine (BETADINE) 10 % ointment Apply topically daily 28 g 0    pravastatin (PRAVACHOL) 10 MG tablet Take 10 mg by mouth daily       rosuvastatin (CRESTOR) 40 MG tablet Take 1 tablet by mouth daily.      semaglutide (OZEMPIC) 2 MG/3ML pen Inject 0.25mg subcutaneously once a week for 4 weeks, then increase dose and inject 0.5mg once a week for 4 weeks.      [START ON 11/18/2024] Semaglutide, 1 MG/DOSE, (OZEMPIC, 1 MG/DOSE,) 4 MG/3ML pen Inject 1 mg subcutaneously once a week for 4 weeks Do not start before November 18, 2024.      [START ON 12/16/2024] Semaglutide, 2 MG/DOSE, (OZEMPIC, 2 MG/DOSE,) 8 MG/3ML pen Inject 2 mg subcutaneously once a week Do not start before December 16, 2024.      sildenafil (VIAGRA) 100 MG tablet Take 1 tablet by mouth daily as needed.      clopidogrel (PLAVIX) 75 MG tablet Take 75 mg by mouth      gabapentin (NEURONTIN) 300 MG capsule TAKE 1 CAPSULE BY MOUTH THREE TIMES DAILY (Patient not taking: Reported on 11/6/2024)  11    ibuprofen (ADVIL/MOTRIN) 600 MG tablet Take 1 tablet (600 mg) by mouth every 6 hours as needed for moderate pain (Patient not taking: Reported on 11/6/2024) 20 tablet 0         Medications related to Pain Management:   Medications related to Pain Management (From now, onward)      None            Allergies:       Allergies   Allergen Reactions    Ampicillin Other (See Comments) and Hives    Codeine GI  Disturbance     LW Reaction: gi upset    Simvastatin Other (See Comments)       Social History:    Social History     Socioeconomic History    Marital status:      Spouse name: Not on file    Number of children: Not on file    Years of education: Not on file    Highest education level: Not on file   Occupational History    Not on file   Tobacco Use    Smoking status: Some Days     Types: Cigars    Smokeless tobacco: Never    Tobacco comments:     5-6/week   Substance and Sexual Activity    Alcohol use: Yes     Comment: Drink every 2-3 days    Drug use: No    Sexual activity: Not on file   Other Topics Concern    Not on file   Social History Narrative    Not on file     Social Drivers of Health     Financial Resource Strain: Not on file   Food Insecurity: No Food Insecurity (8/17/2023)    Received from [employer]    Hunger Vital Sign     Worried About Running Out of Food in the Last Year: Never true     Ran Out of Food in the Last Year: Never true   Transportation Needs: Not on file   Physical Activity: Not on file   Stress: Not on file   Social Connections: Not on file   Interpersonal Safety: Not on file   Housing Stability: Not on file     Social History     Social History Narrative    Not on file         Family history:  No family history on file.      Physical Exam:  Vitals:    11/06/24 0911   BP: 139/89   Pulse: 65   Resp: 16   SpO2: 95%       General: Awake in no apparent distress.   Eyes: Sclerae are anicteric. PERRLA, EOMI   Neck: supple, no masses.   Lungs: unlabored.   Heart: regular rate and rhythm   Abdomen: soft non tender.  Extremities: Pulses are well palpable, no peripheral edema.   Musculoskeletal: All muscle groups are normal in bulk and tone. The patient changes position without pain behavior. The patient walks with a normal gait. Posture is normal. Muscle strength was rated at 5/5 in all groups in the extremities. Examination of the joints reveals preserved range of motion.  The range  of motion of the lumbar spine is normal  in all directions. The spinous processes in the cervical, thoracic, and lumbar spine are midline, with  no tenderness over the paraspinous muscles and facet joints. There was no tenderness to palpation noted over the sacroiliac joints. Negative straight leg test. 5/5 strength with hip flexion/extension, knee flexion/extension, ankle flexion/extension.  Neurologic exam: Sensation to light touch intact throughout all dermatomes bilateral lower extremities.  Psychiatric; Normal affect.   Skin: Warm and Dry.       LABORATORY VALUES:   Recent Labs   Lab Test 10/22/24  2121 11/22/21  1000    141   POTASSIUM 4.3 4.6   CHLORIDE 102 110*   CO2 22 27   ANIONGAP 16* 4   * 97   BUN 16.3 16   CR 0.92 0.71   BELGICA 10.1 8.7       CBC RESULTS:   Recent Labs   Lab Test 10/22/24  2121   WBC 8.4   RBC 5.01   HGB 15.5   HCT 45.2   MCV 90   MCH 30.9   MCHC 34.3   RDW 13.1          Most Recent 3 INR's:No lab results found.      No images are attached to the encounter.     ASSESSMENT/PLAN:                             ASSESSMENT:  Kareem Bai is a 69 year old male who presents via referral by Dr. Cotton. Based on his history and physical exam, his symptoms are consistent with lumbar radiculopathy. We discussed a lumbar epidural steroid injection.         PLAN:    - Medications.   Continue gabapentin as prescribed by Dr. Cotton.    - Interventional procedures:  Schedule for lumbar epidural with next available provider.    - Labs and imaging: None needed for pain management.     - Rehab: The patient is also encouraged to stay active as tolerated.     - Psychology: No current needs.    - Disposition: We will see the patient for above mentioned procedure.       Assessment will be ongoing with changes in treatment as indicated.  Benefits/risks/alternatives to treatment have been reviewed and the patient has been instructed to contact this office if they have any questions or concerns.   This plan of care has been discussed with the patient and the patient is in agreement.     Francy Young, MS4  Patient staffed with Dr. Lala    I saw and examined the patient with the medical student. I have reviewed and agree with the student's note and plan of care and made changes and corrections directly to the body of the note.    TIME SPENT:  BY STUDENT ALONE 20 MIN  BY MYSELF AND MEDICAL STUDENT 30 MIN      Sabina Lala MD  Pain Medicine, Department of Anesthesiology  , [facility]

## 2024-11-06 NOTE — TELEPHONE ENCOUNTER
RN reviewed pre-procedure instructions with patient at office visit 11/6/24 in Pain Clinic.    Neisha Barclay RN

## 2024-11-06 NOTE — LETTER
"11/6/2024       RE: Kareem Bai  [address]  [facility] [address]     Dear Colleague,    Thank you for referring your patient, Kareem Bai, to the [facility] [place] at [facility]. Please see a copy of my visit note below.    [facility] : Consultation Note    Patient: Kareem Bai Age: 69 year old   MRN: [id number] Referred by:  Lula     Date of Visit: November 6, 2024    Reason for consultation:    Kareem Bai is a 69 year old male who is seen in consultation today at the request of his provider Dr. Cotton for a comprehensive evaluation and management of pain.  Primary Care Provider is Avis Travis    Chief complaints:    Chief Complaint   Patient presents with     Consult     I guess I am getting some shots\"     Pain       History of Present illness:     Kareem Bai is a 69 year old male with pain history as described below:     Location: Lower back to anterior left thigh. Worse at anterior thigh.  Laterality: Left  Quality: Numbness and burning  Radiation: Back to the leg  Duration: Three weeks  Severity: 10/10 with activity; 5/10 at rest  Aggravating factors include: Walking and standing  Relieving factors include: Rest  Any bowel or bladder incontinence: No  Other associated symptoms: None    The patient has a medical history significant for L4-5 TLIF (2011), severe L3-4 spinal canal stenosis, left L3-4 foraminal stenosis compressing the L3 nerve root, and L1-2 left-sided disc herniation compressing the L2 nerve root.     Trials of therapies including     PT: Yes: home exercises  TENs Unit: No  Manual Medicine/Chiropractic: Yes; 20 years ago  Surgeries:Yes: fusion as above  Acupuncture: Yes; 15 years ago  Other Integrative therapies: No  Behavioral interventions: No  Previous medication treatments " included: gabapentin 300 mg TID  Previous pain interventions: Steroid injections of the knee for arthritis    [place] Prescription Monitoring Program:   Reviewed. No concerns    Review of Systems:  Review of Systems   Musculoskeletal: Negative.    Skin: Negative.        Patient Supplied Answers To the  Pain Questionnaire      11/1/2024     8:26 AM   [facility] -  Patient Entered Questionnaire/Answers   What number best describes your pain right now:  0 = No pain  to  10 = Worst pain imaginable 9   How would you describe the pain burning    sharp    numbness    dull, aching    throbbing   Which of the following worsen your pain standing    walking   Which of the following improve or reduce your pain lying down    sitting   What number best describes your average pain for the past week:  0 = No pain  to  10 = Worst pain imaginable 9   What number best describes your LOWEST pain in past 24 hours:  0 = No pain  to  10 = Worst pain imaginable 7   What number best describes your WORST pain in past 24 hours:  0 = No pain  to  10 = Worst pain imaginable 10   When is your pain worst Constant   What non-medicine treatments have you already had for your pain other                 No data to display                     2/19/2024     9:24 AM 6/30/2023    10:10 AM   PHQ-2 ( 1999 [employer])   Q1: Little interest or pleasure in doing things 0 0   Q2: Feeling down, depressed or hopeless 0 0   PHQ-2 Score 0 0             No data to display                   Past Medical History:  Past Medical History:   Diagnosis Date     Hypertension      Neuropathy      Prediabetes      Sleep apnea        Past Surgical History:  Past Surgical History:   Procedure Laterality Date     OPTICAL TRACKING SYSTEM ARTHROPLASTY KNEE Right 1/4/2018    Procedure: OPTICAL TRACKING SYSTEM ARTHROPLASTY KNEE;  Right Total Knee Arthroplasty;  Surgeon: Marcello Beatty MD;  Location:  OR       Medications:  Current Outpatient Medications   Medication Sig Dispense  Refill     Cadexomer Iodine, topical, 0.9% (IODOSORB) 0.9 % GEL gel Apply topically daily 10 g 0     ceFAZolin (ANCEF) intermittent infusion 2 g in 100 mL dextrose PRE-MIX Inject 100 mLs (2 g) into the vein every 8 hours Continue for 6 weeks (Nov 24, 2021)       ciclopirox (LOPROX) 0.77 % cream Apply topically 2 times daily To toenails. 90 g 4     cyanocobalamin (CYANOCOBALAMIN) 1000 mcg/mL injection Inject 1,000 mcg into the muscle every 30 days.       gabapentin (NEURONTIN) 300 MG capsule Take 1 capsule (300 mg) by mouth 3 times daily. 90 capsule 0     hydrOXYzine bret (VISTARIL) 25 MG capsule Take 25 mg by mouth.       lisinopril-hydrochlorothiazide (PRINZIDE/ZESTORETIC) 20-25 MG per tablet Take 1 tablet by mouth daily        metFORMIN (GLUCOPHAGE) 1000 MG tablet TAKE 1 TABLET BY MOUTH TWICE DAILY WITH MEALS       naproxen (NAPROSYN) 500 MG tablet Take 500 mg by mouth 2 times daily as needed for moderate pain        povidone-iodine (BETADINE) 10 % ointment Apply topically daily 28 g 0     pravastatin (PRAVACHOL) 10 MG tablet Take 10 mg by mouth daily        rosuvastatin (CRESTOR) 40 MG tablet Take 1 tablet by mouth daily.       semaglutide (OZEMPIC) 2 MG/3ML pen Inject 0.25mg subcutaneously once a week for 4 weeks, then increase dose and inject 0.5mg once a week for 4 weeks.       [START ON 11/18/2024] Semaglutide, 1 MG/DOSE, (OZEMPIC, 1 MG/DOSE,) 4 MG/3ML pen Inject 1 mg subcutaneously once a week for 4 weeks Do not start before November 18, 2024.       [START ON 12/16/2024] Semaglutide, 2 MG/DOSE, (OZEMPIC, 2 MG/DOSE,) 8 MG/3ML pen Inject 2 mg subcutaneously once a week Do not start before December 16, 2024.       sildenafil (VIAGRA) 100 MG tablet Take 1 tablet by mouth daily as needed.       clopidogrel (PLAVIX) 75 MG tablet Take 75 mg by mouth       gabapentin (NEURONTIN) 300 MG capsule TAKE 1 CAPSULE BY MOUTH THREE TIMES DAILY (Patient not taking: Reported on 11/6/2024)  11     ibuprofen (ADVIL/MOTRIN) 600 MG  tablet Take 1 tablet (600 mg) by mouth every 6 hours as needed for moderate pain (Patient not taking: Reported on 11/6/2024) 20 tablet 0         Medications related to Pain Management:   Medications related to Pain Management (From now, onward)      None            Allergies:       Allergies   Allergen Reactions     Ampicillin Other (See Comments) and Hives     Codeine GI Disturbance     LW Reaction: gi upset     Simvastatin Other (See Comments)       Social History:    Social History     Socioeconomic History     Marital status:      Spouse name: Not on file     Number of children: Not on file     Years of education: Not on file     Highest education level: Not on file   Occupational History     Not on file   Tobacco Use     Smoking status: Some Days     Types: Cigars     Smokeless tobacco: Never     Tobacco comments:     5-6/week   Substance and Sexual Activity     Alcohol use: Yes     Comment: Drink every 2-3 days     Drug use: No     Sexual activity: Not on file   Other Topics Concern     Not on file   Social History Narrative     Not on file     Social Drivers of Health     Financial Resource Strain: Not on file   Food Insecurity: No Food Insecurity (8/17/2023)    Received from [facility] Vital Sign      Worried About Running Out of Food in the Last Year: Never true      Ran Out of Food in the Last Year: Never true   Transportation Needs: Not on file   Physical Activity: Not on file   Stress: Not on file   Social Connections: Not on file   Interpersonal Safety: Not on file   Housing Stability: Not on file     Social History     Social History Narrative     Not on file         Family history:  No family history on file.      Physical Exam:  Vitals:    11/06/24 0911   BP: 139/89   Pulse: 65   Resp: 16   SpO2: 95%       General: Awake in no apparent distress.   Eyes: Sclerae are anicteric. PERRLA, EOMI   Neck: supple, no masses.   Lungs: unlabored.   Heart: regular rate and rhythm   Abdomen:  soft non tender.  Extremities: Pulses are well palpable, no peripheral edema.   Musculoskeletal: All muscle groups are normal in bulk and tone. The patient changes position without pain behavior. The patient walks with a normal gait. Posture is normal. Muscle strength was rated at 5/5 in all groups in the extremities. Examination of the joints reveals preserved range of motion.  The range of motion of the lumbar spine is normal  in all directions. The spinous processes in the cervical, thoracic, and lumbar spine are midline, with  no tenderness over the paraspinous muscles and facet joints. There was no tenderness to palpation noted over the sacroiliac joints. Negative straight leg test. 5/5 strength with hip flexion/extension, knee flexion/extension, ankle flexion/extension.  Neurologic exam: Sensation to light touch intact throughout all dermatomes bilateral lower extremities.  Psychiatric; Normal affect.   Skin: Warm and Dry.       LABORATORY VALUES:   Recent Labs   Lab Test 10/22/24  2121 11/22/21  1000    141   POTASSIUM 4.3 4.6   CHLORIDE 102 110*   CO2 22 27   ANIONGAP 16* 4   * 97   BUN 16.3 16   CR 0.92 0.71   BELGICA 10.1 8.7       CBC RESULTS:   Recent Labs   Lab Test 10/22/24  2121   WBC 8.4   RBC 5.01   HGB 15.5   HCT 45.2   MCV 90   MCH 30.9   MCHC 34.3   RDW 13.1          Most Recent 3 INR's:No lab results found.      No images are attached to the encounter.     ASSESSMENT/PLAN:                             ASSESSMENT:  Kareem Bai is a 69 year old male who presents via referral by Dr. Cotton. Based on his history and physical exam, his symptoms are consistent with lumbar radiculopathy. We discussed a lumbar epidural steroid injection.         PLAN:    - Medications.   Continue gabapentin as prescribed by Dr. Cotton.    - Interventional procedures:  Schedule for lumbar epidural with next available provider.    - Labs and imaging: None needed for pain management.     - Rehab: The patient  is also encouraged to stay active as tolerated.     - Psychology: No current needs.    - Disposition: We will see the patient for above mentioned procedure.       Assessment will be ongoing with changes in treatment as indicated.  Benefits/risks/alternatives to treatment have been reviewed and the patient has been instructed to contact this office if they have any questions or concerns.  This plan of care has been discussed with the patient and the patient is in agreement.     Francy Hector, MS4  Patient staffed with Dr. Lala    I saw and examined the patient with the medical student. I have reviewed and agree with the student's note and plan of care and made changes and corrections directly to the body of the note.    TIME SPENT:  BY STUDENT ALONE 20 MIN  BY MYSELF AND MEDICAL STUDENT 30 MIN      Sabina Lala MD  Pain Medicine, Department of Anesthesiology  , [facility]           Again, thank you for allowing me to participate in the care of your patient.      Sincerely,    Sabina Lala MD

## 2024-11-08 ENCOUNTER — TELEPHONE (OUTPATIENT)
Dept: PALLIATIVE MEDICINE | Facility: CLINIC | Age: 69
End: 2024-11-08
Payer: MEDICARE

## 2024-11-08 NOTE — TELEPHONE ENCOUNTER
Phoned the patient and left VM. Stated to call the pain clinic to reschedule injection procedure at [phone].

## 2024-11-26 ENCOUNTER — HOSPITAL ENCOUNTER (OUTPATIENT)
Facility: AMBULATORY SURGERY CENTER | Age: 69
Discharge: HOME | End: 2024-11-26
Attending: STUDENT IN AN ORGANIZED HEALTH CARE EDUCATION/TRAINING PROGRAM | Admitting: STUDENT IN AN ORGANIZED HEALTH CARE EDUCATION/TRAINING PROGRAM
Payer: MEDICARE

## 2024-11-26 ENCOUNTER — ANCILLARY PROCEDURE (OUTPATIENT)
Dept: RADIOLOGY | Facility: AMBULATORY SURGERY CENTER | Age: 69
End: 2024-11-26
Attending: STUDENT IN AN ORGANIZED HEALTH CARE EDUCATION/TRAINING PROGRAM
Payer: COMMERCIAL

## 2024-11-26 VITALS
SYSTOLIC BLOOD PRESSURE: 141 MMHG | BODY MASS INDEX: 29.82 KG/M2 | OXYGEN SATURATION: 97 % | HEIGHT: 73 IN | DIASTOLIC BLOOD PRESSURE: 90 MMHG | TEMPERATURE: 97.1 F | RESPIRATION RATE: 16 BRPM | HEART RATE: 69 BPM | WEIGHT: 225 LBS

## 2024-11-26 DIAGNOSIS — R52 PAIN: ICD-10-CM

## 2024-11-26 LAB — GLUCOSE BLDC GLUCOMTR-MCNC: 106 MG/DL (ref 70–99)

## 2024-11-26 PROCEDURE — 82962 GLUCOSE BLOOD TEST: CPT | Performed by: PATHOLOGY

## 2024-11-26 RX ORDER — LIDOCAINE HYDROCHLORIDE 10 MG/ML
INJECTION, SOLUTION EPIDURAL; INFILTRATION; INTRACAUDAL; PERINEURAL DAILY PRN
Status: DISCONTINUED | OUTPATIENT
Start: 2024-11-26 | End: 2024-11-26 | Stop reason: HOSPADM

## 2024-11-26 RX ORDER — BUPIVACAINE HYDROCHLORIDE 2.5 MG/ML
INJECTION, SOLUTION EPIDURAL; INFILTRATION; INTRACAUDAL DAILY PRN
Status: DISCONTINUED | OUTPATIENT
Start: 2024-11-26 | End: 2024-11-26 | Stop reason: HOSPADM

## 2024-11-26 RX ORDER — DEXAMETHASONE SODIUM PHOSPHATE 10 MG/ML
INJECTION INTRAMUSCULAR; INTRAVENOUS DAILY PRN
Status: DISCONTINUED | OUTPATIENT
Start: 2024-11-26 | End: 2024-11-26 | Stop reason: HOSPADM

## 2024-11-26 RX ORDER — IOPAMIDOL 408 MG/ML
INJECTION, SOLUTION INTRATHECAL DAILY PRN
Status: DISCONTINUED | OUTPATIENT
Start: 2024-11-26 | End: 2024-11-26 | Stop reason: HOSPADM

## 2024-11-26 NOTE — DISCHARGE INSTRUCTIONS
Home Care Instructions after an Epidural Steroid Pain Injection    A lumbar epidural steroid injection delivers steroid medication directly into the area that may be causing your lower back pain and/or leg pain. A cervical or thoracic epidural steroid injection delivers steroids into the epidural space surrounding spinal nerve roots to help relieve pain in the upper spine/neck.    Activity  -Rest today  -Do not work today  -Resume normal activity tomorrow  -DO NOT shower for 24 hours  -DO NOT remove bandaid for 24 hours    Pain  -You may experience soreness at the injection site for one or two days  -You may use an ice pack for 20 minutes every 2 hours for the first 24 hours  -You may use a heating pad after the first 24 hours  -You may use Tylenol (acetaminophen) every 4 hours or other pain medicines as     directed by your physician    You may experience numbness radiating into your legs or arms (depending on the procedure location). This numbness may last several hours. Until sensation returns to normal; please use caution in walking, climbing stairs, and stepping out of your vehicle, etc.      Common side effects of steroids:  Not everyone will experience corticosteroid side effects. If side effects are experienced, they will gradually subside in the 7-10 day period following an injection. Most common side effects include:  -Flushed face and/or chest  -Feeling of warmth, particularly in the face but could be an overall feeling of warmth  -Increased blood sugar in diabetic patients  -Menstrual irregularities my occur. If taking hormone-based birth control an alternate method of birth control is recommended  -Sleep disturbances and/or mood swings are possible  -Leg cramps    Please contact us if you have:  -Severe pain  -Fever more than 101.5 degrees Fahrenheit  -Signs of infection at the injection site (redness, swelling, or drainage)    FOR PAIN CENTER PATIENTS:  If you have questions, please contact the Pain  Clinic at [phone] Option #1 between the hours of 7:00 am and 3:00 pm Monday through Friday. After office hours you can contact the on call provider by dialing [phone]. If you need immediate attention, we recommend that you go to a hospital emergency room or dial [phone].

## 2024-11-27 ENCOUNTER — OFFICE VISIT (OUTPATIENT)
Dept: PODIATRY | Facility: CLINIC | Age: 69
End: 2024-11-27
Payer: MEDICARE

## 2024-11-27 DIAGNOSIS — E11.49 TYPE II OR UNSPECIFIED TYPE DIABETES MELLITUS WITH NEUROLOGICAL MANIFESTATIONS, NOT STATED AS UNCONTROLLED(250.60) (H): Primary | ICD-10-CM

## 2024-11-27 DIAGNOSIS — E11.69 TYPE 2 DIABETES MELLITUS WITH DIABETIC FOOT DEFORMITY (H): ICD-10-CM

## 2024-11-27 DIAGNOSIS — M21.969 TYPE 2 DIABETES MELLITUS WITH DIABETIC FOOT DEFORMITY (H): ICD-10-CM

## 2024-11-27 DIAGNOSIS — L84 TYPE 2 DIABETES MELLITUS WITH PRESSURE CALLUS (H): ICD-10-CM

## 2024-11-27 DIAGNOSIS — E11.628 TYPE 2 DIABETES MELLITUS WITH PRESSURE CALLUS (H): ICD-10-CM

## 2024-11-27 RX ORDER — UREA 40 %
CREAM (GRAM) TOPICAL DAILY
Qty: 198 G | Refills: 4 | Status: SHIPPED | OUTPATIENT
Start: 2024-11-27

## 2024-11-27 ASSESSMENT — PAIN SCALES - GENERAL: PAINLEVEL_OUTOF10: NO PAIN (0)

## 2024-11-27 NOTE — NURSING NOTE
Kareem Bai's chief complaint for this visit includes:  Chief Complaint   Patient presents with    Left Foot - New Patient     Foot check    Right Foot - New Patient     Foot check     PCP: Avis Travis    Referring Provider:  Referred Self, MD  No address on file    There were no vitals taken for this visit.  No Pain (0)     Do you need any medication refills at today's visit? NO    Allergies   Allergen Reactions    Ampicillin Other (See Comments) and Hives    Codeine GI Disturbance     LW Reaction: gi upset    Simvastatin Other (See Comments)       Austin May, EMT

## 2024-11-27 NOTE — OP NOTE
[facility]  Procedure Note: Lumbar Interlaminar Epidural Steroid Injection    Patient: Kareem Bai Age: 69 year old   MRN: [id number] Attending: Dr. Mayer      Procedure Performed  Interlaminar epidural steroid injection between L3-L4.    Assistant Physician  None    Pre/Post-Procedure Diagnosis  Lumbosacral radiculopathy    Anesthesia: Local  Blood Loss: Minimal  Drains and Specimens: None  Complications: None     Informed Consent: The patient's condition, proposed procedure and risks, benefits and alternatives to this procedure were discussed with the patient in detail.  All questions were answered in detail and the patient chose to proceed.        Time Out: Patient confirmed with 2 identifiers and allergies reviewed.  Procedure side/location confirmed with patient. Procedure consent signed and dated.  Appropriate radiology and/or laboratory results reviewed.  H&P completed/updated per policy and availability of required items confirmed.    Procedure Description  The patient was positioned in a prone position on the procedure table and monitors were attached. The lumbosacral region was prepped and draped in a sterile manner. Fluoroscopy was used to identify the L3-L4 interspace and the skin and subcutaneous tissue overlying this area was anesthetized with 2% lidocaine. A 20 guage 3.5 inch Tuohy needle was introduced and advanced under fluoroscpy in the AP and lateral views at this level.  The epidural space was identified using a loss of resistance technique at ~5cm. Next 1mL of contrast was injected and showed characteristic epidural spread without vascular uptake in the AP and lateral views. A solution containing 10mg dexamethasone and 2mL of 0.25% bupivacaine was diluted to 4mL with sterile, preservative-free saline. This solution was injected easily. The needle was then flushed and removed and the area covered with a sterile bandage.    Pre-procedure pain score: 6/10  Post-procedure  pain score: 5/10    Medication Questions/Issues Discussed Today: None    Miguel Mayer MD  Department of Anesthesiology  Chronic and Interventional Pain Medicine

## 2024-11-27 NOTE — LETTER
11/27/2024      Kareem Bai  [address] [facility] [id number]      Dear Colleague,    Thank you for referring your patient, Kareem Bai, to the [facility]. Please see a copy of my visit note below.    Past Medical History:   Diagnosis Date     Hypertension      Neuropathy      Prediabetes      Sleep apnea      Patient Active Problem List   Diagnosis     S/P total knee arthroplasty, left     Diabetes mellitus, type 2 (H)     Diabetic ulcer of toe of left foot associated with type 2 diabetes mellitus, with fat layer exposed (H)     Bradycardia     Left knee pain, unspecified chronicity     Acute bilateral low back pain with left-sided sciatica     Lumbar radiculopathy     Past Surgical History:   Procedure Laterality Date     INJECT EPIDURAL LUMBAR Left 11/26/2024    Procedure: Lumbar Epidural Steroid Injection;  Surgeon: Miguel Mayer MD;  [place] [facility] [place]     OPTICAL TRACKING SYSTEM ARTHROPLASTY KNEE Right 1/4/2018    Procedure: OPTICAL TRACKING SYSTEM ARTHROPLASTY KNEE;  Right Total Knee Arthroplasty;  Surgeon: Marcello Beatty MD;  Location:  OR     Social History     Socioeconomic History     Marital status:      Spouse name: Not on file     Number of children: Not on file     Years of education: Not on file     Highest education level: Not on file   Occupational History     Not on file   Tobacco Use     Smoking status: Some Days     Types: Cigars     Smokeless tobacco: Never     Tobacco comments:     5-6/week   Substance and Sexual Activity     Alcohol use: Yes     Comment: Drink every 2-3 days     Drug use: No     Sexual activity: Not on file   Other Topics Concern     Not on file   Social History Narrative     Not on file     Social Drivers of Health     Financial Resource Strain: Not on file   Food Insecurity: No Food Insecurity (8/17/2023)    Received from [facility]    Hunger Vital Sign      Worried About Running Out of Food in  "the Last Year: Never true      Ran Out of Food in the Last Year: Never true   Transportation Needs: Not on file   Physical Activity: Not on file   Stress: Not on file   Social Connections: Not on file   Interpersonal Safety: High Risk (11/26/2024)    Interpersonal Safety      Do you feel physically and emotionally safe where you currently live?: No      Within the past 12 months, have you been hit, slapped, kicked or otherwise physically hurt by someone?: No      Within the past 12 months, have you been humiliated or emotionally abused in other ways by your partner or ex-partner?: No   Housing Stability: Not on file     No family history on file.        Lab Results   Component Value Date    WBC 8.4 10/22/2024     Lab Results   Component Value Date    RBC 5.01 10/22/2024     Lab Results   Component Value Date    HGB 15.5 10/22/2024    HGB 11.6 01/06/2018     Lab Results   Component Value Date    HCT 45.2 10/22/2024     No components found for: \"MCT\"  Lab Results   Component Value Date    MCV 90 10/22/2024     Lab Results   Component Value Date    MCH 30.9 10/22/2024     Lab Results   Component Value Date    MCHC 34.3 10/22/2024     Lab Results   Component Value Date    RDW 13.1 10/22/2024     Lab Results   Component Value Date     10/22/2024     Last Comprehensive Metabolic Panel:  Lab Results   Component Value Date     10/22/2024    POTASSIUM 4.3 10/22/2024    CHLORIDE 102 10/22/2024    CO2 22 10/22/2024    ANIONGAP 16 (H) 10/22/2024     (H) 11/26/2024    BUN 16.3 10/22/2024    CR 0.92 10/22/2024    GFRESTIMATED 89 10/22/2024    BELGICA 10.1 10/22/2024       9/23/2024 hemoglobin A1c 7.1 as noted in the EMR.        Subjective findings- 69-year-old returns clinic for diabetic foot cares.  Relates to no ulcers or sores or injuries since we seen him last, relates he wears diabetic shoes and insoles and not sure if he can get a new pair until next year because he did have some insoles made this year so " wants to wait on new ones, relates he has numbness tingling and neuropathy in his feet.  Relates he intermittently uses Goldbond lotion on his feet.  Relates he always has a callus on his left big toe but now has it on his right big toe as well from wearing his hunting boots recently.    Objective findings- DP and PT are 2 out of 4 bilaterally.  Has dorsal contracted digits 1 through 5 bilaterally.  Has hyperkeratotic tissue buildup with ecchymosis distal hallux bilaterally and minimally distal second toe bilaterally with no erythema, no edema, no drainage, no odor, no calor, no pain on palpation.  Sharp dull is decreased with 5.07 [place] Shay monofilament on 2+ spots on the right foot and 2+ spots on the left foot, proprioception is intact bilaterally, deep tendon reflexes are intact bilaterally, no tendon voids bilaterally.  There is no erythema, no edema, no drainage, no odor, no calor, no drainage bilaterally.    Assessment and plan- Diabetes with peripheral neuropathy.  Diabetes with Hammertoes.  Diabetes with callus and preulcerative changes on the Hallux bilaterally.  Diagnosis and treatment options discussed with the patient.  Prescription for urea 40% cream given and use discussed with the patient.  If this is not covered by insurance or cost too much he can use over-the- 20% urea cream.  Advised him on stretching and offloading.  Advised him on light pumice stone use.  Continue the Diabetic shoes and insoles.  He opted for no surgical referral today.  Previous notes reviewed.  Return to clinic and see me in 3 months.                                        Moderate level of medical decision making.      Again, thank you for allowing me to participate in the care of your patient.        Sincerely,        Alvarado Lua DPM

## 2024-11-27 NOTE — PROGRESS NOTES
Past Medical History:   Diagnosis Date    Hypertension     Neuropathy     Prediabetes     Sleep apnea      Patient Active Problem List   Diagnosis    S/P total knee arthroplasty, left    Diabetes mellitus, type 2 (H)    Diabetic ulcer of toe of left foot associated with type 2 diabetes mellitus, with fat layer exposed (H)    Bradycardia    Left knee pain, unspecified chronicity    Acute bilateral low back pain with left-sided sciatica    Lumbar radiculopathy     Past Surgical History:   Procedure Laterality Date    INJECT EPIDURAL LUMBAR Left 11/26/2024    Procedure: Lumbar Epidural Steroid Injection;  Surgeon: Miguel Mayer MD;  [place] [facility] [place]    OPTICAL TRACKING SYSTEM ARTHROPLASTY KNEE Right 1/4/2018    Procedure: OPTICAL TRACKING SYSTEM ARTHROPLASTY KNEE;  Right Total Knee Arthroplasty;  Surgeon: Marcello Beatty MD;  Location:  OR     Social History     Socioeconomic History    Marital status:      Spouse name: Not on file    Number of children: Not on file    Years of education: Not on file    Highest education level: Not on file   Occupational History    Not on file   Tobacco Use    Smoking status: Some Days     Types: Cigars    Smokeless tobacco: Never    Tobacco comments:     5-6/week   Substance and Sexual Activity    Alcohol use: Yes     Comment: Drink every 2-3 days    Drug use: No    Sexual activity: Not on file   Other Topics Concern    Not on file   Social History Narrative    Not on file     Social Drivers of Health     Financial Resource Strain: Not on file   Food Insecurity: No Food Insecurity (8/17/2023)    Received from [facility]    Hunger Vital Sign     Worried About Running Out of Food in the Last Year: Never true     Ran Out of Food in the Last Year: Never true   Transportation Needs: Not on file   Physical Activity: Not on file   Stress: Not on file   Social Connections: Not on file   Interpersonal Safety: High Risk (11/26/2024)    Interpersonal Safety     Do you  "feel physically and emotionally safe where you currently live?: No     Within the past 12 months, have you been hit, slapped, kicked or otherwise physically hurt by someone?: No     Within the past 12 months, have you been humiliated or emotionally abused in other ways by your partner or ex-partner?: No   Housing Stability: Not on file     No family history on file.        Lab Results   Component Value Date    WBC 8.4 10/22/2024     Lab Results   Component Value Date    RBC 5.01 10/22/2024     Lab Results   Component Value Date    HGB 15.5 10/22/2024    HGB 11.6 01/06/2018     Lab Results   Component Value Date    HCT 45.2 10/22/2024     No components found for: \"MCT\"  Lab Results   Component Value Date    MCV 90 10/22/2024     Lab Results   Component Value Date    MCH 30.9 10/22/2024     Lab Results   Component Value Date    MCHC 34.3 10/22/2024     Lab Results   Component Value Date    RDW 13.1 10/22/2024     Lab Results   Component Value Date     10/22/2024     Last Comprehensive Metabolic Panel:  Lab Results   Component Value Date     10/22/2024    POTASSIUM 4.3 10/22/2024    CHLORIDE 102 10/22/2024    CO2 22 10/22/2024    ANIONGAP 16 (H) 10/22/2024     (H) 11/26/2024    BUN 16.3 10/22/2024    CR 0.92 10/22/2024    GFRESTIMATED 89 10/22/2024    BELGICA 10.1 10/22/2024       9/23/2024 hemoglobin A1c 7.1 as noted in the EMR.        Subjective findings- 69-year-old returns clinic for diabetic foot cares.  Relates to no ulcers or sores or injuries since we seen him last, relates he wears diabetic shoes and insoles and not sure if he can get a new pair until next year because he did have some insoles made this year so wants to wait on new ones, relates he has numbness tingling and neuropathy in his feet.  Relates he intermittently uses Goldbond lotion on his feet.  Relates he always has a callus on his left big toe but now has it on his right big toe as well from wearing his hunting boots " recently.    Objective findings- DP and PT are 2 out of 4 bilaterally.  Has dorsal contracted digits 1 through 5 bilaterally.  Has hyperkeratotic tissue buildup with ecchymosis distal hallux bilaterally and minimally distal second toe bilaterally with no erythema, no edema, no drainage, no odor, no calor, no pain on palpation.  Sharp dull is decreased with 5.07 [place] Shay monofilament on 2+ spots on the right foot and 2+ spots on the left foot, proprioception is intact bilaterally, deep tendon reflexes are intact bilaterally, no tendon voids bilaterally.  There is no erythema, no edema, no drainage, no odor, no calor, no drainage bilaterally.    Assessment and plan- Diabetes with peripheral neuropathy.  Diabetes with Hammertoes.  Diabetes with callus and preulcerative changes on the Hallux bilaterally.  Diagnosis and treatment options discussed with the patient.  Prescription for urea 40% cream given and use discussed with the patient.  If this is not covered by insurance or cost too much he can use over-the- 20% urea cream.  Advised him on stretching and offloading.  Advised him on light pumice stone use.  Continue the Diabetic shoes and insoles.  He opted for no surgical referral today.  Previous notes reviewed.  Return to clinic and see me in 3 months.                                        Moderate level of medical decision making.

## 2024-11-30 ENCOUNTER — HEALTH MAINTENANCE LETTER (OUTPATIENT)
Age: 69
End: 2024-11-30

## 2025-02-03 ENCOUNTER — OFFICE VISIT (OUTPATIENT)
Dept: PODIATRY | Facility: CLINIC | Age: 70
End: 2025-02-03
Payer: MEDICARE

## 2025-02-03 DIAGNOSIS — M21.969 TYPE 2 DIABETES MELLITUS WITH DIABETIC FOOT DEFORMITY (H): ICD-10-CM

## 2025-02-03 DIAGNOSIS — L84 TYPE 2 DIABETES MELLITUS WITH PRESSURE CALLUS (H): ICD-10-CM

## 2025-02-03 DIAGNOSIS — E11.69 TYPE 2 DIABETES MELLITUS WITH DIABETIC FOOT DEFORMITY (H): ICD-10-CM

## 2025-02-03 DIAGNOSIS — E11.628 TYPE 2 DIABETES MELLITUS WITH PRESSURE CALLUS (H): ICD-10-CM

## 2025-02-03 DIAGNOSIS — E11.49 TYPE II OR UNSPECIFIED TYPE DIABETES MELLITUS WITH NEUROLOGICAL MANIFESTATIONS, NOT STATED AS UNCONTROLLED(250.60) (H): Primary | ICD-10-CM

## 2025-02-03 PROCEDURE — 99214 OFFICE O/P EST MOD 30 MIN: CPT | Mod: 25 | Performed by: PODIATRIST

## 2025-02-03 PROCEDURE — 11056 PARNG/CUTG B9 HYPRKR LES 2-4: CPT | Performed by: PODIATRIST

## 2025-02-03 NOTE — PROGRESS NOTES
Past Medical History:   Diagnosis Date    Hypertension     Neuropathy     Prediabetes     Sleep apnea      Patient Active Problem List   Diagnosis    S/P total knee arthroplasty, left    Diabetes mellitus, type 2 (H)    Diabetic ulcer of toe of left foot associated with type 2 diabetes mellitus, with fat layer exposed (H)    Bradycardia    Left knee pain, unspecified chronicity    Acute bilateral low back pain with left-sided sciatica    Lumbar radiculopathy     Past Surgical History:   Procedure Laterality Date    INJECT EPIDURAL LUMBAR Left 11/26/2024    Procedure: Lumbar Epidural Steroid Injection;  Surgeon: Miguel Mayer MD;  [place] [facility] [place]    OPTICAL TRACKING SYSTEM ARTHROPLASTY KNEE Right 1/4/2018    Procedure: OPTICAL TRACKING SYSTEM ARTHROPLASTY KNEE;  Right Total Knee Arthroplasty;  Surgeon: Marcello Beatty MD;  Location:  OR     Social History     Socioeconomic History    Marital status:      Spouse name: Not on file    Number of children: Not on file    Years of education: Not on file    Highest education level: Not on file   Occupational History    Not on file   Tobacco Use    Smoking status: Some Days     Types: Cigars    Smokeless tobacco: Never    Tobacco comments:     5-6/week   Substance and Sexual Activity    Alcohol use: Yes     Comment: Drink every 2-3 days    Drug use: No    Sexual activity: Not on file   Other Topics Concern    Not on file   Social History Narrative    Not on file     Social Drivers of Health     Financial Resource Strain: Not on file   Food Insecurity: No Food Insecurity (8/17/2023)    Received from [facility]    Hunger Vital Sign     Worried About Running Out of Food in the Last Year: Never true     Ran Out of Food in the Last Year: Never true   Transportation Needs: Not on file   Physical Activity: Not on file   Stress: Not on file   Social Connections: Not on file   Interpersonal Safety: High Risk (11/26/2024)    Interpersonal Safety     Do you  feel physically and emotionally safe where you currently live?: No     Within the past 12 months, have you been hit, slapped, kicked or otherwise physically hurt by someone?: No     Within the past 12 months, have you been humiliated or emotionally abused in other ways by your partner or ex-partner?: No   Housing Stability: Not on file     No family history on file.        1/31/2025 hemoglobin A1c 6.8 as noted in the EMR.  1/25/2025 CBC and BMP reviewed as noted in EMR.  1/25/2025 CRP 2.3 as noted in the EMR.    1/25/2025 x-rays left foot report reviewed as noted in the EMR.                            Subjective findings- 69-year-old returns clinic for Diabetes with peripheral Neuropathy and Hammertoes and callus.  Relates he was recently seen in the emergency room for infection in his left big toenail, relates he took Augmentin for 1 week and finished that on Friday, relates to taking the Augmentin with no problems and feels it helped.  Relates he was ice fishing and wore a different pair of boots.  Relates he has Diabetic shoes and insoles and could use new ones.  Relates he has numbness tingling and Neuropathy in his feet.  Relates he files his calluses.    Objective findings- DP and PT are 2 out of 4 bilaterally.  Has dorsally contracted digits 1 through 5 bilaterally.  Has left and right distal Hallux hyperkeratotic tissue buildup with ecchymosis with no erythema, minimal edema, no drainage, no odor, no calor, no pain on palpation.  Has left distal Hallux blistering with subhyperkeratotic dried blood.  Sharp/dull is decreased with 5.07 [place] Shay monofilament on 2+ spots on the right foot and 2+ spots on the left foot.  Proprioception is intact bilaterally.  Deep tendon reflexes are intact bilaterally.  There is no tendon voids bilaterally.    Assessment and plan- Diabetes with peripheral Neuropathy.  Diabetes with Hammertoes.  Callus with preulcerative changes bilateral Hallux and blistering on the left  Hallux.  Diagnosis and treatment options discussed with the patient.  Advised him to continue the Urea cream which he relates he has.  Prescription for Diabetic shoes and inserts given and he is given the phone number addressed orthotics and prosthetics lab for these.  Left and right Hallux hyperkeratotic tissue buildup was sharp debrided with a tissue cutter and we cleaned the Hallux with ChloraPrep and applied a Primapore bandage today upon consent.  He can use a light dressing as needed.  No antibiotics today.  He opted for no surgical referral for the [facility] today.  Previous notes reviewed including emergency room notes from 1/25/2025.  Return to clinic and see me in 2 months.                                                    Moderate level of medical decision making.

## 2025-02-03 NOTE — NURSING NOTE
Kareem Bai's chief complaint for this visit includes:  Chief Complaint   Patient presents with    RECHECK     Bilateral foot recheck and left big toe ulcer recheck     PCP: Avis Travis    Referring Provider:  Referred Self, MD  No address on file    There were no vitals taken for this visit.  Data Unavailable     Do you need any medication refills at today's visit? NO    Allergies   Allergen Reactions    Ampicillin Other (See Comments) and Hives    Codeine GI Disturbance     LW Reaction: gi upset    Simvastatin Other (See Comments)       Vangie Jamil LPN

## 2025-02-03 NOTE — LETTER
2/3/2025      Kareem Bai  [address] [facility] [address]      Dear Colleague,    Thank you for referring your patient, Kareem Bai, to the [facility]. Please see a copy of my visit note below.    Past Medical History:   Diagnosis Date    Hypertension     Neuropathy     Prediabetes     Sleep apnea      Patient Active Problem List   Diagnosis    S/P total knee arthroplasty, left    Diabetes mellitus, type 2 (H)    Diabetic ulcer of toe of left foot associated with type 2 diabetes mellitus, with fat layer exposed (H)    Bradycardia    Left knee pain, unspecified chronicity    Acute bilateral low back pain with left-sided sciatica    Lumbar radiculopathy     Past Surgical History:   Procedure Laterality Date    INJECT EPIDURAL LUMBAR Left 11/26/2024    Procedure: Lumbar Epidural Steroid Injection;  Surgeon: Miguel Mayer MD;  Location: [facility] [place]    OPTICAL TRACKING SYSTEM ARTHROPLASTY KNEE Right 1/4/2018    Procedure: OPTICAL TRACKING SYSTEM ARTHROPLASTY KNEE;  Right Total Knee Arthroplasty;  Surgeon: Marcello Beatty MD;  Location:  OR     Social History     Socioeconomic History    Marital status:      Spouse name: Not on file    Number of children: Not on file    Years of education: Not on file    Highest education level: Not on file   Occupational History    Not on file   Tobacco Use    Smoking status: Some Days     Types: Cigars    Smokeless tobacco: Never    Tobacco comments:     5-6/week   Substance and Sexual Activity    Alcohol use: Yes     Comment: Drink every 2-3 days    Drug use: No    Sexual activity: Not on file   Other Topics Concern    Not on file   Social History Narrative    Not on file     Social Drivers of Health     Financial Resource Strain: Not on file   Food Insecurity: No Food Insecurity (8/17/2023)    [facility]    Hunger Vital Sign     Worried About Running Out of Food in the Last Year: Never true      Ran Out of Food in the Last Year: Never true   Transportation Needs: Not on file   Physical Activity: Not on file   Stress: Not on file   Social Connections: Not on file   Interpersonal Safety: High Risk (11/26/2024)    Interpersonal Safety     Do you feel physically and emotionally safe where you currently live?: No     Within the past 12 months, have you been hit, slapped, kicked or otherwise physically hurt by someone?: No     Within the past 12 months, have you been humiliated or emotionally abused in other ways by your partner or ex-partner?: No   Housing Stability: Not on file     No family history on file.        1/31/2025 hemoglobin A1c 6.8 as noted in the EMR.  1/25/2025 CBC and BMP reviewed as noted in EMR.  1/25/2025 CRP 2.3 as noted in the EMR.    1/25/2025 x-rays left foot report reviewed as noted in the EMR.                            Subjective findings- 69-year-old returns clinic for Diabetes with peripheral Neuropathy and Hammertoes and callus.  Relates he was recently seen in the emergency room for infection in his left big toenail, relates he took Augmentin for 1 week and finished that on Friday, relates to taking the Augmentin with no problems and feels it helped.  Relates he was ice fishing and wore a different pair of boots.  Relates he has Diabetic shoes and insoles and could use new ones.  Relates he has numbness tingling and Neuropathy in his feet.  Relates he files his calluses.    Objective findings- DP and PT are 2 out of 4 bilaterally.  Has dorsally contracted digits 1 through 5 bilaterally.  Has left and right distal Hallux hyperkeratotic tissue buildup with ecchymosis with no erythema, minimal edema, no drainage, no odor, no calor, no pain on palpation.  Has left distal Hallux blistering with subhyperkeratotic dried blood.  Sharp/dull is decreased with 5.07 [employer] Shay monofilament on 2+ spots on the right foot and 2+ spots on the left foot.  Proprioception is intact bilaterally.   Deep tendon reflexes are intact bilaterally.  There is no tendon voids bilaterally.    Assessment and plan- Diabetes with peripheral Neuropathy.  Diabetes with Hammertoes.  Callus with preulcerative changes bilateral Hallux and blistering on the left Hallux.  Diagnosis and treatment options discussed with the patient.  Advised him to continue the Urea cream which he relates he has.  Prescription for Diabetic shoes and inserts given and he is given the phone number addressed orthotics and prosthetics lab for these.  Left and right Hallux hyperkeratotic tissue buildup was sharp debrided with a tissue cutter and we cleaned the Hallux with ChloraPrep and applied a Primapore bandage today upon consent.  He can use a light dressing as needed.  No antibiotics today.  He opted for no surgical referral for the Hammertoes today.  Previous notes reviewed including emergency room notes from 1/25/2025.  Return to clinic and see me in 2 months.          Moderate level of medical decision making.      Again, thank you for allowing me to participate in the care of your patient.        Sincerely,    Alvarado Lua DPM  Electronically signed

## 2025-03-09 ENCOUNTER — HEALTH MAINTENANCE LETTER (OUTPATIENT)
Age: 70
End: 2025-03-09

## 2025-04-07 ENCOUNTER — OFFICE VISIT (OUTPATIENT)
Dept: PODIATRY | Facility: CLINIC | Age: 70
End: 2025-04-07
Payer: COMMERCIAL

## 2025-04-07 DIAGNOSIS — M21.969 TYPE 2 DIABETES MELLITUS WITH DIABETIC FOOT DEFORMITY (H): ICD-10-CM

## 2025-04-07 DIAGNOSIS — E11.69 TYPE 2 DIABETES MELLITUS WITH DIABETIC FOOT DEFORMITY (H): ICD-10-CM

## 2025-04-07 DIAGNOSIS — E11.628 TYPE 2 DIABETES MELLITUS WITH PRESSURE CALLUS (H): ICD-10-CM

## 2025-04-07 DIAGNOSIS — E11.49 TYPE II OR UNSPECIFIED TYPE DIABETES MELLITUS WITH NEUROLOGICAL MANIFESTATIONS, NOT STATED AS UNCONTROLLED(250.60) (H): Primary | ICD-10-CM

## 2025-04-07 DIAGNOSIS — L84 TYPE 2 DIABETES MELLITUS WITH PRESSURE CALLUS (H): ICD-10-CM

## 2025-04-07 PROCEDURE — 99214 OFFICE O/P EST MOD 30 MIN: CPT | Performed by: PODIATRIST

## 2025-04-07 NOTE — LETTER
4/7/2025      Kareem Bai  [address] [facility] [id number]      Dear Colleague,    Thank you for referring your patient, Kareem Bai, to the [facility]. Please see a copy of my visit note below.    Past Medical History:   Diagnosis Date     Hypertension      Neuropathy      Prediabetes      Sleep apnea      Patient Active Problem List   Diagnosis     S/P total knee arthroplasty, left     Diabetes mellitus, type 2 (H)     Diabetic ulcer of toe of left foot associated with type 2 diabetes mellitus, with fat layer exposed (H)     Bradycardia     Left knee pain, unspecified chronicity     Acute bilateral low back pain with left-sided sciatica     Lumbar radiculopathy     Past Surgical History:   Procedure Laterality Date     INJECT EPIDURAL LUMBAR Left 11/26/2024    Procedure: Lumbar Epidural Steroid Injection;  Surgeon: Miguel Mayer MD;  [place] [facility] [place]     OPTICAL TRACKING SYSTEM ARTHROPLASTY KNEE Right 1/4/2018    Procedure: OPTICAL TRACKING SYSTEM ARTHROPLASTY KNEE;  Right Total Knee Arthroplasty;  Surgeon: Marcello Beatty MD;  Location:  OR     Social History     Socioeconomic History     Marital status:      Spouse name: Not on file     Number of children: Not on file     Years of education: Not on file     Highest education level: Not on file   Occupational History     Not on file   Tobacco Use     Smoking status: Some Days     Types: Cigars     Smokeless tobacco: Never     Tobacco comments:     5-6/week   Substance and Sexual Activity     Alcohol use: Yes     Comment: Drink every 2-3 days     Drug use: No     Sexual activity: Not on file   Other Topics Concern     Not on file   Social History Narrative     Not on file     Social Drivers of Health     Financial Resource Strain: Not on file   Food Insecurity: No Food Insecurity (8/17/2023)    Received from [facility]    Hunger Vital Sign      Worried About Running Out of Food in the  Last Year: Never true      Ran Out of Food in the Last Year: Never true   Transportation Needs: Not on file   Physical Activity: Not on file   Stress: Not on file   Social Connections: Not on file   Interpersonal Safety: High Risk (11/26/2024)    Interpersonal Safety      Do you feel physically and emotionally safe where you currently live?: No      Within the past 12 months, have you been hit, slapped, kicked or otherwise physically hurt by someone?: No      Within the past 12 months, have you been humiliated or emotionally abused in other ways by your partner or ex-partner?: No   Housing Stability: Not on file     No family history on file.        1/31/2025 hemoglobin A1c 6.8 as noted in the EMR.                      Subjective findings- 69-year-old returns clinic for Diabetes with neuropathy and Hammertoes and callus with preulcerative changes.  Relates he has Neuropathy and numbness and tingling in his feet.  Relates he just got his new diabetic shoes and insoles and has not started wearing them yet.  Relates he self trims his calluses and may have trimmed the distal second toe..  Relates no specific injuries.  Relates he is using moisturizing lotion on his feet.    Objective findings- DP and PT are 2 out of 4 bilaterally.  Has dorsally contracted digits 1 through 5 bilaterally.  Has distal hallux bilaterally, hyperkeratotic tissue buildup with ecchymosis and dried blood and distal right second toe pressure changes with no erythema, minimal edema, no drainage, no odor, no calor, no pain on palpation.    Assessment and plan- Diabetes with peripheral Neuropathy.  Diabetes with Hammertoes.  Callus with preulcerative changes distal Hallux bilaterally and right second toe.  Diagnosis and treatment options discussed with patient.  Patient is advised on stretching.  Continue the urea cream.  Continue the Diabetic shoes and insoles.  Discussed with him referral to surgery, he relates he does not want to do that.  We  cleaned the toes with ChloraPrep today upon consent.  Discussed with him Primapore use as needed.  No debridement today.  No antibiotics today.  No imaging today.  Previous notes reviewed.  Return to clinic in 3 to 4 months.                                      Moderate level of medical decision making.      Again, thank you for allowing me to participate in the care of your patient.        Sincerely,        Alvarado Lua DPM    Electronically signed

## 2025-04-07 NOTE — PROGRESS NOTES
Past Medical History:   Diagnosis Date    Hypertension     Neuropathy     Prediabetes     Sleep apnea      Patient Active Problem List   Diagnosis    S/P total knee arthroplasty, left    Diabetes mellitus, type 2 (H)    Diabetic ulcer of toe of left foot associated with type 2 diabetes mellitus, with fat layer exposed (H)    Bradycardia    Left knee pain, unspecified chronicity    Acute bilateral low back pain with left-sided sciatica    Lumbar radiculopathy     Past Surgical History:   Procedure Laterality Date    INJECT EPIDURAL LUMBAR Left 11/26/2024    Procedure: Lumbar Epidural Steroid Injection;  Surgeon: Miguel Mayer MD;  [place] [facility] [place]    OPTICAL TRACKING SYSTEM ARTHROPLASTY KNEE Right 1/4/2018    Procedure: OPTICAL TRACKING SYSTEM ARTHROPLASTY KNEE;  Right Total Knee Arthroplasty;  Surgeon: Marcello Beatty MD;  Location:  OR     Social History     Socioeconomic History    Marital status:      Spouse name: Not on file    Number of children: Not on file    Years of education: Not on file    Highest education level: Not on file   Occupational History    Not on file   Tobacco Use    Smoking status: Some Days     Types: Cigars    Smokeless tobacco: Never    Tobacco comments:     5-6/week   Substance and Sexual Activity    Alcohol use: Yes     Comment: Drink every 2-3 days    Drug use: No    Sexual activity: Not on file   Other Topics Concern    Not on file   Social History Narrative    Not on file     Social Drivers of Health     Financial Resource Strain: Not on file   Food Insecurity: No Food Insecurity (8/17/2023)    Received from [facility]    Hunger Vital Sign     Worried About Running Out of Food in the Last Year: Never true     Ran Out of Food in the Last Year: Never true   Transportation Needs: Not on file   Physical Activity: Not on file   Stress: Not on file   Social Connections: Not on file   Interpersonal Safety: High Risk (11/26/2024)    Interpersonal Safety     Do you  feel physically and emotionally safe where you currently live?: No     Within the past 12 months, have you been hit, slapped, kicked or otherwise physically hurt by someone?: No     Within the past 12 months, have you been humiliated or emotionally abused in other ways by your partner or ex-partner?: No   Housing Stability: Not on file     No family history on file.        1/31/2025 hemoglobin A1c 6.8 as noted in the EMR.                      Subjective findings- 69-year-old returns clinic for Diabetes with neuropathy and Hammertoes and callus with preulcerative changes.  Relates he has Neuropathy and numbness and tingling in his feet.  Relates he just got his new diabetic shoes and insoles and has not started wearing them yet.  Relates he self trims his calluses and may have trimmed the distal second toe..  Relates no specific injuries.  Relates he is using moisturizing lotion on his feet.    Objective findings- DP and PT are 2 out of 4 bilaterally.  Has dorsally contracted digits 1 through 5 bilaterally.  Has distal hallux bilaterally, hyperkeratotic tissue buildup with ecchymosis and dried blood and distal right second toe pressure changes with no erythema, minimal edema, no drainage, no odor, no calor, no pain on palpation.    Assessment and plan- Diabetes with peripheral Neuropathy.  Diabetes with Hammertoes.  Callus with preulcerative changes distal Hallux bilaterally and right second toe.  Diagnosis and treatment options discussed with patient.  Patient is advised on stretching.  Continue the urea cream.  Continue the Diabetic shoes and insoles.  Discussed with him referral to surgery, he relates he does not want to do that.  We cleaned the toes with ChloraPrep today upon consent.  Discussed with him Primapore use as needed.  No debridement today.  No antibiotics today.  No imaging today.  Previous notes reviewed.  Return to clinic in 3 to 4 months.                                      Moderate level of  medical decision making.

## 2025-04-07 NOTE — NURSING NOTE
Kareem Bai's chief complaint for this visit includes:  Chief Complaint   Patient presents with    RECHECK     Ulcer recheck and wound cares, big toe     PCP: Avis Travis    Referring Provider:  Referred Self, MD  No address on file    There were no vitals taken for this visit.  Data Unavailable     Do you need any medication refills at today's visit? NO    Allergies   Allergen Reactions    Ampicillin Other (See Comments) and Hives    Codeine GI Disturbance     LW Reaction: gi upset    Simvastatin Other (See Comments)       Vangie Jamil LPN

## 2025-06-28 ENCOUNTER — HEALTH MAINTENANCE LETTER (OUTPATIENT)
Age: 70
End: 2025-06-28

## 2025-07-08 ENCOUNTER — OFFICE VISIT (OUTPATIENT)
Dept: PODIATRY | Facility: CLINIC | Age: 70
End: 2025-07-08
Payer: COMMERCIAL

## 2025-07-08 DIAGNOSIS — E11.49 TYPE II OR UNSPECIFIED TYPE DIABETES MELLITUS WITH NEUROLOGICAL MANIFESTATIONS, NOT STATED AS UNCONTROLLED(250.60) (H): Primary | ICD-10-CM

## 2025-07-08 DIAGNOSIS — E11.69 TYPE 2 DIABETES MELLITUS WITH DIABETIC FOOT DEFORMITY (H): ICD-10-CM

## 2025-07-08 DIAGNOSIS — M21.969 TYPE 2 DIABETES MELLITUS WITH DIABETIC FOOT DEFORMITY (H): ICD-10-CM

## 2025-07-08 DIAGNOSIS — B35.1 ONYCHOMYCOSIS: ICD-10-CM

## 2025-07-08 DIAGNOSIS — L84 TYPE 2 DIABETES MELLITUS WITH PRESSURE CALLUS (H): ICD-10-CM

## 2025-07-08 DIAGNOSIS — E11.628 TYPE 2 DIABETES MELLITUS WITH PRESSURE CALLUS (H): ICD-10-CM

## 2025-07-08 PROCEDURE — 99214 OFFICE O/P EST MOD 30 MIN: CPT | Performed by: PODIATRIST

## 2025-07-08 RX ORDER — ECONAZOLE NITRATE 10 MG/G
CREAM TOPICAL DAILY
Qty: 85 G | Refills: 5 | Status: SHIPPED | OUTPATIENT
Start: 2025-07-08

## 2025-07-08 NOTE — NURSING NOTE
Kareem Bai's chief complaint for this visit includes:  Chief Complaint   Patient presents with    RECHECK     Toe recheck and wound cares     PCP: Avis Travis    Referring Provider:  Referred Self, MD  No address on file    There were no vitals taken for this visit.  Data Unavailable     Do you need any medication refills at today's visit? NO    Allergies   Allergen Reactions    Ampicillin Other (See Comments) and Hives    Codeine GI Disturbance     LW Reaction: gi upset    Simvastatin Other (See Comments)       Vangie Jamil LPN

## 2025-07-08 NOTE — LETTER
7/8/2025      Kareem Bai  [address] [facility] [address]      Dear Colleague,    Thank you for referring your patient, Kareem Bai, to the [facility]. Please see a copy of my visit note below.    Past Medical History:   Diagnosis Date    Hypertension     Neuropathy     Prediabetes     Sleep apnea      Patient Active Problem List   Diagnosis    S/P total knee arthroplasty, left    Diabetes mellitus, type 2 (H)    Diabetic ulcer of toe of left foot associated with type 2 diabetes mellitus, with fat layer exposed (H)    Bradycardia    Left knee pain, unspecified chronicity    Acute bilateral low back pain with left-sided sciatica    Lumbar radiculopathy     Past Surgical History:   Procedure Laterality Date    INJECT EPIDURAL LUMBAR Left 11/26/2024    Procedure: Lumbar Epidural Steroid Injection;  Surgeon: Miguel Mayer MD;  Location: [facility] [place]    OPTICAL TRACKING SYSTEM ARTHROPLASTY KNEE Right 1/4/2018    Procedure: OPTICAL TRACKING SYSTEM ARTHROPLASTY KNEE;  Right Total Knee Arthroplasty;  Surgeon: Marcello Beatty MD;  Location:  OR     Social History     Socioeconomic History    Marital status:      Spouse name: Not on file    Number of children: Not on file    Years of education: Not on file    Highest education level: Not on file   Occupational History    Not on file   Tobacco Use    Smoking status: Some Days     Types: Cigars    Smokeless tobacco: Never    Tobacco comments:     5-6/week   Substance and Sexual Activity    Alcohol use: Yes     Comment: Drink every 2-3 days    Drug use: No    Sexual activity: Not on file   Other Topics Concern    Not on file   Social History Narrative    Not on file     Social Drivers of Health     Financial Resource Strain: Not on file   Food Insecurity: No Food Insecurity (8/17/2023)    [facility]    Hunger Vital Sign     Worried About Running Out of Food in the Last Year: Never true      Ran Out of Food in the Last Year: Never true   Transportation Needs: Not on file   Physical Activity: Not on file   Stress: Not on file   Social Connections: Not on file   Interpersonal Safety: High Risk (11/26/2024)    Interpersonal Safety     Do you feel physically and emotionally safe where you currently live?: No     Within the past 12 months, have you been hit, slapped, kicked or otherwise physically hurt by someone?: No     Within the past 12 months, have you been humiliated or emotionally abused in other ways by your partner or ex-partner?: No   Housing Stability: Not on file     No family history on file.      6/24/2025 hemoglobin A1c 6.8 as noted in the EMR.                          Subjective findings- 70-year-old returns clinic for Diabetes with peripheral Neuropathy, Hammertoes and callus with preulcerative changes with Hammertoes.  Relates he is doing relatively well.  Relates he has numbness tingling and neuropathy in his feet.  Relates to no ulcers, no sores, no injuries since we seen him last.  Relates he feels his toenails do not grow.  Relates he was using the Urea cream but he ran out of this.  Relates he had some pain in the plantar heel previously was unsure what he will, relates no injuries.  Relates he is wearing his diabetic shoes and insoles.      Objective findings- DP and PT are 2-4 bilaterally.  Has dorsally contracted digits 1 through 5 bilaterally.  Has decreased right and left distal hallux hyperkeratotic tissue buildup with ecchymosis with no erythema, no edema, no drainage, no odor, no calor, no pain on palpation.  Has dystrophic brittle nails with subungual debris dystrophy and discoloration bilaterally 1 through 5 to differing degrees.  Has dry scaly skin on the distal toes bilaterally.    Assessment and plan- Diabetes with peripheral Neuropathy.  Diabetes with Hammertoes.  Callus with preulcerative changes of the distal hallux bilaterally and right second toe is improved.   Onychomycosis with distal toe Tinea Pedis changes bilaterally.  Diagnosis and treatment options discussed with patient.  Continue diabetic shoes.  Patient is advised on a runner stretch and wearing his Diabetic insoles for the Plantar Fasciitis symptoms which appear to have resolved.  Prescription for Econazole cream given and use discussed with them.  Previous notes reviewed.  Return to clinic and see me in 4 months.                Moderate level of medical decision making.        Again, thank you for allowing me to participate in the care of your patient.        Sincerely,        Alvarado Lua DPM    Electronically signed

## 2025-07-08 NOTE — PROGRESS NOTES
Past Medical History:   Diagnosis Date    Hypertension     Neuropathy     Prediabetes     Sleep apnea      Patient Active Problem List   Diagnosis    S/P total knee arthroplasty, left    Diabetes mellitus, type 2 (H)    Diabetic ulcer of toe of left foot associated with type 2 diabetes mellitus, with fat layer exposed (H)    Bradycardia    Left knee pain, unspecified chronicity    Acute bilateral low back pain with left-sided sciatica    Lumbar radiculopathy     Past Surgical History:   Procedure Laterality Date    INJECT EPIDURAL LUMBAR Left 11/26/2024    Procedure: Lumbar Epidural Steroid Injection;  Surgeon: Miguel Mayer MD;  [place] [facility] [place]    OPTICAL TRACKING SYSTEM ARTHROPLASTY KNEE Right 1/4/2018    Procedure: OPTICAL TRACKING SYSTEM ARTHROPLASTY KNEE;  Right Total Knee Arthroplasty;  Surgeon: Marcello Beatty MD;  Location:  OR     Social History     Socioeconomic History    Marital status:      Spouse name: Not on file    Number of children: Not on file    Years of education: Not on file    Highest education level: Not on file   Occupational History    Not on file   Tobacco Use    Smoking status: Some Days     Types: Cigars    Smokeless tobacco: Never    Tobacco comments:     5-6/week   Substance and Sexual Activity    Alcohol use: Yes     Comment: Drink every 2-3 days    Drug use: No    Sexual activity: Not on file   Other Topics Concern    Not on file   Social History Narrative    Not on file     Social Drivers of Health     Financial Resource Strain: Not on file   Food Insecurity: No Food Insecurity (8/17/2023)    Received from [facility]    Hunger Vital Sign     Worried About Running Out of Food in the Last Year: Never true     Ran Out of Food in the Last Year: Never true   Transportation Needs: Not on file   Physical Activity: Not on file   Stress: Not on file   Social Connections: Not on file   Interpersonal Safety: High Risk (11/26/2024)    Interpersonal Safety     Do you  feel physically and emotionally safe where you currently live?: No     Within the past 12 months, have you been hit, slapped, kicked or otherwise physically hurt by someone?: No     Within the past 12 months, have you been humiliated or emotionally abused in other ways by your partner or ex-partner?: No   Housing Stability: Not on file     No family history on file.      6/24/2025 hemoglobin A1c 6.8 as noted in the EMR.                          Subjective findings- 70-year-old returns clinic for Diabetes with peripheral Neuropathy, Hammertoes and callus with preulcerative changes with Hammertoes.  Relates he is doing relatively well.  Relates he has numbness tingling and neuropathy in his feet.  Relates to no ulcers, no sores, no injuries since we seen him last.  Relates he feels his toenails do not grow.  Relates he was using the Urea cream but he ran out of this.  Relates he had some pain in the plantar heel previously was unsure what he will, relates no injuries.  Relates he is wearing his diabetic shoes and insoles.      Objective findings- DP and PT are 2-4 bilaterally.  Has dorsally contracted digits 1 through 5 bilaterally.  Has decreased right and left distal hallux hyperkeratotic tissue buildup with ecchymosis with no erythema, no edema, no drainage, no odor, no calor, no pain on palpation.  Has dystrophic brittle nails with subungual debris dystrophy and discoloration bilaterally 1 through 5 to differing degrees.  Has dry scaly skin on the distal toes bilaterally.    Assessment and plan- Diabetes with peripheral Neuropathy.  Diabetes with Hammertoes.  Callus with preulcerative changes of the distal hallux bilaterally and right second toe is improved.  Onychomycosis with distal toe Tinea Pedis changes bilaterally.  Diagnosis and treatment options discussed with patient.  Continue diabetic shoes.  Patient is advised on a runner stretch and wearing his Diabetic insoles for the Plantar Fasciitis symptoms which  appear to have resolved.  Prescription for Econazole cream given and use discussed with them.  Previous notes reviewed.  Return to clinic and see me in 4 months.                                                  Moderate level of medical decision making.

## (undated) DEVICE — LINEN GOWN X4 5410

## (undated) DEVICE — SU VICRYL 1 CT-1 27" UND J261H

## (undated) DEVICE — BATTERY STRYKER NAVIGATION SYSTEM 6000-006-000

## (undated) DEVICE — SU MONOCRYL 4-0 PS-2 18" UND Y496G

## (undated) DEVICE — DRSG ABDOMINAL 07 1/2X8" 7197D

## (undated) DEVICE — DRSG DRAIN 4X4" 7086

## (undated) DEVICE — SYR 10ML FINGER CONTROL W/O NDL 309695

## (undated) DEVICE — Device

## (undated) DEVICE — BLADE SAW SAGITTAL STRK 19.5X90X1.27MM 2108-109-000S11

## (undated) DEVICE — SOL WATER IRRIG 1000ML BOTTLE 2F7114

## (undated) DEVICE — TOURNIQUET CUFF 30" REPRO BLUE 60-7070-105

## (undated) DEVICE — SUCTION MANIFOLD DORNOCH ULTRA CART UL-CL500

## (undated) DEVICE — NDL EPIDURAL TUOHY 20GA 3.5" 405028

## (undated) DEVICE — DECANTER TRANSFER DEVICE 2008S

## (undated) DEVICE — PEN MARKING SKIN W/LABELS 31145918

## (undated) DEVICE — CARD NAVIGATION SOFTWARE STRK KNEE C4C 6003-640-999

## (undated) DEVICE — GOWN XLG DISP 9545

## (undated) DEVICE — LINEN TOWEL PACK X5 5464

## (undated) DEVICE — DRSG TEGADERM 4X4 3/4" 1626W

## (undated) DEVICE — SU MONOCRYL 3-0 PS-1 27" Y936H

## (undated) DEVICE — GLOVE BIOGEL PI ULTRATOUCH SZ 7.5 41175

## (undated) DEVICE — CAST PADDING 6" STERILE 9046S

## (undated) DEVICE — PREP DURAPREP 26ML APL 8630

## (undated) DEVICE — SUCTION IRR SYSTEM W/O TIP INTERPULSE HANDPIECE 0210-100-000

## (undated) DEVICE — PIN FIXATION STRK EX-PIN ORTHOLOCK 3X110MM 6007-103-110

## (undated) DEVICE — ESU GROUND PAD UNIVERSAL W/O CORD

## (undated) DEVICE — DRSG STERI STRIP 1X5" R1548

## (undated) DEVICE — SYR 07ML EPIDURAL LOSS OF RESISTANCE PULSATOR 4905

## (undated) DEVICE — DRAIN HEMOVAC RESERVOIR KIT 10FR 1/8" MED 00-2550-002-10

## (undated) DEVICE — HOOD FLYTE W/PEELAWAY 408-800-100

## (undated) DEVICE — GLOVE PROTEXIS BLUE W/NEU-THERA 8.0  2D73EB80

## (undated) DEVICE — TRAY PAIN INJECTION 97A 640

## (undated) DEVICE — BONE CEMENT MIXEVAC III HI VAC KIT  0206-015-000

## (undated) DEVICE — PREP CHLORAPREP W/ORANGE TINT 10.5ML 260715

## (undated) DEVICE — SU VICRYL 2-0 CT-2 27" UND J269H

## (undated) DEVICE — NDL 22GA 1.5"

## (undated) DEVICE — STRAP KNEE/BODY 31143004

## (undated) DEVICE — BLADE KNIFE SURG 15 371115

## (undated) DEVICE — GLOVE PROTEXIS BLUE W/NEU-THERA 7.5  2D73EB75

## (undated) DEVICE — LINEN BACK PACK 5440

## (undated) DEVICE — BASIN SET MAJOR

## (undated) DEVICE — GLOVE PROTEXIS W/NEU-THERA 8.0  2D73TE80

## (undated) DEVICE — BONE CLEANING TIP INTERPULSE  0210-010-000

## (undated) DEVICE — SOL NACL 0.9% IRRIG 1000ML BOTTLE 2F7124

## (undated) DEVICE — SOL NACL 0.9% IRRIG 3000ML BAG 2B7477

## (undated) RX ORDER — FENTANYL CITRATE 50 UG/ML
INJECTION, SOLUTION INTRAMUSCULAR; INTRAVENOUS
Status: DISPENSED
Start: 2018-01-04

## (undated) RX ORDER — ACETAMINOPHEN 325 MG/1
TABLET ORAL
Status: DISPENSED
Start: 2018-01-04

## (undated) RX ORDER — EPHEDRINE SULFATE 50 MG/ML
INJECTION, SOLUTION INTRAMUSCULAR; INTRAVENOUS; SUBCUTANEOUS
Status: DISPENSED
Start: 2018-01-04

## (undated) RX ORDER — GABAPENTIN 300 MG/1
CAPSULE ORAL
Status: DISPENSED
Start: 2018-01-04

## (undated) RX ORDER — CLINDAMYCIN PHOSPHATE 900 MG/50ML
INJECTION, SOLUTION INTRAVENOUS
Status: DISPENSED
Start: 2018-01-04

## (undated) RX ORDER — LIDOCAINE HYDROCHLORIDE 20 MG/ML
INJECTION, SOLUTION EPIDURAL; INFILTRATION; INTRACAUDAL; PERINEURAL
Status: DISPENSED
Start: 2018-01-04

## (undated) RX ORDER — PHENYLEPHRINE HCL IN 0.9% NACL 1 MG/10 ML
SYRINGE (ML) INTRAVENOUS
Status: DISPENSED
Start: 2018-01-04

## (undated) RX ORDER — CELECOXIB 200 MG/1
CAPSULE ORAL
Status: DISPENSED
Start: 2018-01-04

## (undated) RX ORDER — PROPOFOL 10 MG/ML
INJECTION, EMULSION INTRAVENOUS
Status: DISPENSED
Start: 2018-01-04

## (undated) RX ORDER — ONDANSETRON 2 MG/ML
INJECTION INTRAMUSCULAR; INTRAVENOUS
Status: DISPENSED
Start: 2018-01-04

## (undated) RX ORDER — DEXAMETHASONE SODIUM PHOSPHATE 4 MG/ML
INJECTION, SOLUTION INTRA-ARTICULAR; INTRALESIONAL; INTRAMUSCULAR; INTRAVENOUS; SOFT TISSUE
Status: DISPENSED
Start: 2018-01-04

## (undated) RX ORDER — HYDROMORPHONE HYDROCHLORIDE 1 MG/ML
INJECTION, SOLUTION INTRAMUSCULAR; INTRAVENOUS; SUBCUTANEOUS
Status: DISPENSED
Start: 2018-01-04